# Patient Record
Sex: FEMALE | Race: WHITE | NOT HISPANIC OR LATINO | Employment: OTHER | URBAN - METROPOLITAN AREA
[De-identification: names, ages, dates, MRNs, and addresses within clinical notes are randomized per-mention and may not be internally consistent; named-entity substitution may affect disease eponyms.]

---

## 2017-01-26 ENCOUNTER — OFFICE VISIT (OUTPATIENT)
Dept: PAIN MEDICINE | Facility: CLINIC | Age: 37
End: 2017-01-26
Attending: ANESTHESIOLOGY
Payer: COMMERCIAL

## 2017-01-26 VITALS
WEIGHT: 217.63 LBS | TEMPERATURE: 99 F | SYSTOLIC BLOOD PRESSURE: 119 MMHG | HEART RATE: 73 BPM | HEIGHT: 62 IN | DIASTOLIC BLOOD PRESSURE: 69 MMHG | BODY MASS INDEX: 40.05 KG/M2

## 2017-01-26 DIAGNOSIS — M51.16 DISPLACEMENT OF LUMBAR DISC WITH RADICULOPATHY: ICD-10-CM

## 2017-01-26 DIAGNOSIS — M54.16 LEFT LUMBAR RADICULITIS: ICD-10-CM

## 2017-01-26 DIAGNOSIS — M51.36 DDD (DEGENERATIVE DISC DISEASE), LUMBAR: Primary | ICD-10-CM

## 2017-01-26 PROCEDURE — 99999 PR PBB SHADOW E&M-EST. PATIENT-LVL III: CPT | Mod: PBBFAC,,, | Performed by: ANESTHESIOLOGY

## 2017-01-26 PROCEDURE — 99214 OFFICE O/P EST MOD 30 MIN: CPT | Mod: S$GLB,,, | Performed by: ANESTHESIOLOGY

## 2017-01-26 PROCEDURE — 1159F MED LIST DOCD IN RCRD: CPT | Mod: S$GLB,,, | Performed by: ANESTHESIOLOGY

## 2017-01-26 RX ORDER — METHYLPREDNISOLONE 4 MG/1
TABLET ORAL
Refills: 0 | COMMUNITY
Start: 2017-01-16 | End: 2017-01-26

## 2017-01-26 RX ORDER — MELOXICAM 15 MG/1
TABLET ORAL
Refills: 1 | COMMUNITY
Start: 2017-01-01 | End: 2017-01-26

## 2017-01-26 RX ORDER — HYDROCODONE BITARTRATE AND ACETAMINOPHEN 10; 325 MG/1; MG/1
TABLET ORAL
Refills: 0 | COMMUNITY
Start: 2017-01-16 | End: 2017-03-13

## 2017-01-26 RX ORDER — TIZANIDINE 2 MG/1
TABLET ORAL
Refills: 1 | COMMUNITY
Start: 2017-01-16 | End: 2017-02-24 | Stop reason: DRUGHIGH

## 2017-01-26 RX ORDER — GABAPENTIN 300 MG/1
CAPSULE ORAL
Qty: 90 CAPSULE | Refills: 5 | Status: SHIPPED | OUTPATIENT
Start: 2017-01-26 | End: 2017-03-24 | Stop reason: SDUPTHER

## 2017-01-26 RX ORDER — TIZANIDINE HYDROCHLORIDE 2 MG/1
CAPSULE, GELATIN COATED ORAL
COMMUNITY
End: 2017-02-24 | Stop reason: DRUGHIGH

## 2017-01-26 RX ORDER — NABUMETONE 500 MG/1
500 TABLET, FILM COATED ORAL 2 TIMES DAILY PRN
Qty: 60 TABLET | Refills: 5 | Status: SHIPPED | OUTPATIENT
Start: 2017-01-26 | End: 2017-07-25

## 2017-01-26 NOTE — PROGRESS NOTES
Subjective:      Patient ID: Sarah Mukherjee is a 36 y.o. female.    Chief Complaint: Low-back Pain    Referred by: No ref. provider found     HPI:    Mrs. Mukherjee is a 34-year-old female who presents today with Low back pain.  Her medical history is significant for depression, anxiety, neurogenic bladder and endometriosis, status post total hysterectomy. She reports her back pain is present about 2 and half years.  She has been seeing Dr. Matt for her pain.  He has had multiple injections for her all with limited benefit.  These have included radiofrequency ablations and epidurals.  Her most recent was March 11 of this year.  This procedure actually worsened her pain. Of note, she reports multiple life stressors, including the fact that she has had a hysterectomy which means that she her  cannot have children.   Her pain is described in detail below.    Interval History (1/26/2017):  She returns today for follow up.  She reports that she was doing better until 2 months ago when she start having a new onset of left lower extremity radiating pain.  The pain originates in her low back and radiates down the posterolateral aspect of her left leg to the anterior shin.  This began after she was hunting and sitting for a prolonged period of time in a deer stand.  She had one what sounds like a Left L4 TFESI by Dr. Mike Stewart that caused her pain to be worse.  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    Physical Therapy: The extended course of physical therapy last year.  This was helpful while she was going.  She did her home exercise program until a recent bladder infection.  Since that time she's not done her HEP    Non-pharmacologic Treatment: Rest and heat make her pain better.         · TENS? Not tried    Pain Medications:         · Currently taking: Norco 10/325 mg 3 times daily.  She occasionally takes OTC NSAIDs as needed. It is somewhat helpful.    · Has tried in the  past:  Hydrocodone/acetaminophen was not helpful.  She recently tried gabapentin 100 mg daily with no benefit.      · Has not tried: TCAs, SNRIs, cream    Blood thinners: None    Interventional Therapies:   · Multiple epidural steroid injections and radiofrequency ablation.  · Left L4 TFESI: worsening pain    Relevant Surgeries: She has had a hysterectomy    Affecting sleep? Yes    Affecting daily activities? Yes    Depressive symptoms? Yes          · SI/HI? No    Work status: She is disabled    Pain Scales  Best: 4/10  Worst: 9/10  Usually: 7/10  Today: 7/10    Low-back Pain   This is a chronic problem. The current episode started more than 1 year ago. The problem occurs constantly. The problem has been gradually worsening since onset. The pain is present in the lumbar spine. The pain does not radiate. Quality: Sharp and Deep. The pain is at a severity of 7/10. The pain is moderate. The pain is the same all the time. The symptoms are aggravated by standing and bending (Walking and Getting out of bed or chair). She has tried injection treatment (Medication and Laying Down) for the symptoms. The treatment provided moderate relief.     Review of Systems   Musculoskeletal: Positive for back pain.   Psychiatric/Behavioral: Positive for depression. The patient is nervous/anxious.    All other systems reviewed and are negative.      Past Medical History   Diagnosis Date    Crohn's disease     Degenerative joint disease of spine     Esophagitis     Lumbar facet arthropathy 6/20/2014    Neurogenic bladder        Past Surgical History   Procedure Laterality Date    Hysterectomy       total hysterectomy in 20s due to fibroid, endometriosis    Mastectomy      Pelvic laparoscopy      Breast reconstruction         Review of patient's allergies indicates:   Allergen Reactions    Penicillins Hives       Current Outpatient Prescriptions   Medication Sig Dispense Refill    alprazolam (XANAX) 0.5 MG tablet Take 1 tablet  "(0.5 mg total) by mouth 2 (two) times daily. as needed for anxiety. 60 tablet 2    benzonatate (TESSALON) 200 MG capsule Take 200 mg by mouth 3 (three) times daily as needed for Cough.      doxycycline (DORYX) 100 MG EC tablet Take 100 mg by mouth 2 (two) times daily.      estradiol (ESTRACE) 2 MG tablet Take 1 tablet (2 mg total) by mouth once daily. 90 tablet 1    guaifenesin-codeine 100-10 mg/5 ml (TUSSI-ORGANIDIN NR)  mg/5 mL syrup Take 5 mLs by mouth 3 (three) times daily as needed for Cough.      sertraline (ZOLOFT) 100 MG tablet Take 1 tablet (100 mg total) by mouth once daily. 90 tablet 3    sumatriptan (IMITREX) 25 MG Tab Take 1 tablet (25 mg total) by mouth every 2 (two) hours as needed. 10 tablet 5    topiramate (TOPAMAX) 25 MG tablet TAKE 1 TABLET BY MOUTH EVERY NIGHT AT BEDTIME FOR 7 DAYS THEN INCREASE TO 2 TABLETS BY MOUTH EVERY NIGHT AT BEDTIME 180 tablet 2    zolpidem (AMBIEN) 5 MG Tab Take 1 tablet (5 mg total) by mouth nightly as needed. 30 tablet 2     No current facility-administered medications for this visit.        Family History   Problem Relation Age of Onset    Diabetes Mother     Hypertension Mother     Breast cancer Maternal Aunt 47    Ovarian cancer Maternal Aunt        Social History     Social History    Marital status:      Spouse name: N/A    Number of children: N/A    Years of education: N/A     Occupational History    Not on file.     Social History Main Topics    Smoking status: Never Smoker    Smokeless tobacco: Not on file    Alcohol use No    Drug use: No    Sexual activity: Yes     Other Topics Concern    Not on file     Social History Narrative           Objective:      Vitals:    01/26/17 0838   BP: 119/69   Pulse: 73   Temp: 98.9 °F (37.2 °C)   Weight: 98.7 kg (217 lb 9.5 oz)   Height: 5' 2" (1.575 m)   PainSc: 10-Worst pain ever   PainLoc: Back       Ortho/SPM Exam    GEN:  Well developed, well nourished.  No acute distress. No pain " behavior.  HEENT:  No trauma.  Mucous membranes moist.  Nares patent bilaterally.  PSYCH: Normal affect. Thought content appropriate.  CHEST:  Breathing symmetric.  No audible wheezing.  ABD: Soft, non-tender, non-distended.  SKIN:  Warm, pink, dry.  No rash on exposed areas.    EXT:  No cyanosis, clubbing, or edema.  No color change or changes in nail or hair growth.  NEURO/MUSCULOSKELETAL:  Fully alert, oriented, and appropriate. Speech normal belia. No cranial nerve deficits.   Gait: Normal.  Present trendelenburg sign bilaterally.   Motor Strength: 5/5 motor strength throughout lower extremities.   Sensory: No sensory deficit in the lower extremities.   Reflexes:  2+ and symmetric throughout.  Downgoing Babinski's bilaterally.  No clonus or spasticity.  L-Spine:  Decreased ROM with pain on Flexion. Negative facet loading bilaterally.  Negative SLR left.  Negative femoral stretch on left  SI Joint/Hip: Negative CHANDNI bilaterally.  Negative FADIR bilaterally.  TTP over left>right lumbar paraspinals.  No TTP over hips, piriformis muscles, or GTB.        Imaging:      Result Narrative       MRI lumbar spine without contrast.    Comparison: None.    Technique: Sagittal T1, T2, stir and axial T2 and T1-weighted images were obtained. The patient received 20 cc of Omniscan IV contrast .    Results: The alignment of the lumbar spine is normal. Vertebral body heights and disk spaces are relatively well maintained. No evidence of malignant bone marrow replacement process or infection. The conus medullaris terminates in good position. Very  mild disk desiccation seen at L4-5 with very minimal disk bulge and small left foraminal disk protrusion. No there is no central canal or foraminal stenosis is seen at any level. There is mild facet joint degenerative changes at L5 S1. The paraspinal  soft tissues appear normal. Following the administration of IV gadolinium contrast, no areas of abnormal enhancement seen to suggest an  inflammatory, infectious or tumor process.          Result Impression       Subtle left foraminal disk protrusion at L4-L5 causing no significant mass effect on the exiting nerve root. There is no severe central canal or foraminal stenosis.         Assessment:       Encounter Diagnoses   Name Primary?    DDD (degenerative disc disease), lumbar Yes    Left lumbar radiculitis     Displacement of lumbar disc with radiculopathy          Plan:       Sarah was seen today for low-back pain.    Diagnoses and all orders for this visit:    DDD (degenerative disc disease), lumbar  -     nabumetone (RELAFEN) 500 MG tablet; Take 1 tablet (500 mg total) by mouth 2 (two) times daily as needed for Pain.    Left lumbar radiculitis  -     gabapentin (NEURONTIN) 300 MG capsule; Increase as directed until taking 1 capsule TID    Displacement of lumbar disc with radiculopathy    From her first visit: She has a history of chronic pain following GYN surgery with irritative bladder.  In addition to this, she has a history of multiple life stressors.  Her exam is essentially benign with each of the above diagnoses the relatively minor.  Her MRI is essentially normal.  I suspect that there may be a large underlying central component to her pain.  Because of this, I would favor starting with conservative treatment regiment.  I recommend holding off on interventional procedures for now.  I do not feel that she is a candidate for spinal cord termination at this time.  Of note, on her physical exam today her core strength was quite weak.     Assessment from 1/26/2017: Today, her central sensitization is under much better control than when I saw her in the past.  Today, she has focal physical exam findings and symptoms of a left L4 radiculitis.     I discussed the treatment options with her today, including risks, benefits, and alternatives. All available images were reviewed. The patient is aware of the risks and benefits of the medications  being prescribed, common side effects, and proper usage.    1. Will schedule for L4/5 ILESI  2. Nabumetone as above.  3. Start Gabapentin 300 MG, titrating up to an initial dose of 300 mg TID.  Will titrate up as tolerated to a goal dose of 6955-2276 mg daily. Stay at most comfortable dose. May increase further if tolerated. Advised of possible s/e including sedation, dizziness, and swelling in the extremities.   4. After this injection, we will send a referral to PT.  5. I have stressed the importance of physical activity and a home exercise plan to help with pain and improve overall health.  6. RTC in 4-6 weeks or sooner if needed.     Greater than 30 minutes spent in total in todays visit with the patient, with more than half that time direct face to face counseling and education with the patient today. We discussed the disease process, prognosis, treatment plan, and risks and benefits.    Thank you for allowing me to participate in the care of this patient.   Please do not hesitate to call me at [phone] with any questions or concerns.

## 2017-01-26 NOTE — MR AVS SNAPSHOT
[facility]  [address]  [place] [address]  Phone: [phone]  Fax: [phone]                  Sarah Mukherjee   2017 8:45 AM   Office Visit    Description:  Female : 1980   Provider:  Andra Burt MD   Department:  [facility]           Reason for Visit     Low-back Pain           Diagnoses this Visit        Comments    DDD (degenerative disc disease), lumbar    -  Primary     Left lumbar radiculitis         Displacement of lumbar disc with radiculopathy                To Do List           Future Appointments        Provider Department Dept Phone    3/2/2017 10:15 AM Andra Burt MD [facility] [phone]    3/29/2017 10:00 AM Fadi Sullivan MD [facility] [phone]      Your Future Surgeries/Procedures     2017   Surgery with Andra Burt MD   [facility]    [address] [facility] [address] [phone]              Goals (5 Years of Data)     None       These Medications        Disp Refills Start End    gabapentin (NEURONTIN) 300 MG capsule 90 capsule 5 2017    Increase as directed until taking 1 capsule TID    Pharmacy: [facility] [address] [place] [address] [email or web address] [facility] Ph #: [phone]       nabumetone (RELAFEN) 500 MG tablet 60 tablet 5 2017    Take 1 tablet (500 mg total) by mouth 2 (two) times daily as needed for Pain. - Oral    Pharmacy: [facility] [id number] [place] [address] [email or web address] [facility] Ph #: [phone]         [facility] On Call     [facility] On Call Nurse Care Line -  Assistance  Registered nurses in the [facility] provide clinical advisement, health education, appointment booking, and other advisory services.  Call for this free service at [phone].             Medications           Message regarding Medications      Verify the changes and/or additions to your medication regime listed below are the same as discussed with your clinician today.  If any of these changes or additions are incorrect, please notify your healthcare provider.        START taking these NEW medications        Refills    gabapentin (NEURONTIN) 300 MG capsule 5    Sig: Increase as directed until taking 1 capsule TID    Class: Normal    nabumetone (RELAFEN) 500 MG tablet 5    Sig: Take 1 tablet (500 mg total) by mouth 2 (two) times daily as needed for Pain.    Class: Normal    Route: Oral      STOP taking these medications     methylPREDNISolone (MEDROL DOSEPACK) 4 mg tablet TK UTD    meloxicam (MOBIC) 15 MG tablet TK 1 T PO QD AFTER MEDROL DOSE PACK           Verify that the below list of medications is an accurate representation of the medications you are currently taking.  If none reported, the list may be blank. If incorrect, please contact your healthcare provider. Carry this list with you in case of emergency.           Current Medications     alprazolam (XANAX) 0.5 MG tablet Take 1 tablet (0.5 mg total) by mouth 2 (two) times daily. as needed for anxiety.    estradiol (ESTRACE) 2 MG tablet Take 1 tablet (2 mg total) by mouth once daily.    hydrocodone-acetaminophen 10-325mg (NORCO)  mg Tab TK 1 T PO Q 8 H PRN P    sertraline (ZOLOFT) 100 MG tablet Take 1 tablet (100 mg total) by mouth once daily.    sumatriptan (IMITREX) 25 MG Tab Take 1 tablet (25 mg total) by mouth every 2 (two) hours as needed.    tizanidine 2 mg Cap Take by mouth.    topiramate (TOPAMAX) 25 MG tablet TAKE 1 TABLET BY MOUTH EVERY NIGHT AT BEDTIME FOR 7 DAYS THEN INCREASE TO 2 TABLETS BY MOUTH EVERY NIGHT AT BEDTIME    zolpidem (AMBIEN) 5 MG Tab Take 1 tablet (5 mg total) by mouth nightly as needed.    benzonatate (TESSALON) 200 MG capsule Take 200 mg by mouth 3 (three) times daily as needed for Cough.    doxycycline (DORYX) 100 MG EC tablet Take 100 mg by mouth 2 (two)  "times daily.    gabapentin (NEURONTIN) 300 MG capsule Increase as directed until taking 1 capsule TID    guaifenesin-codeine 100-10 mg/5 ml (TUSSI-ORGANIDIN NR)  mg/5 mL syrup Take 5 mLs by mouth 3 (three) times daily as needed for Cough.    nabumetone (RELAFEN) 500 MG tablet Take 1 tablet (500 mg total) by mouth 2 (two) times daily as needed for Pain.    tizanidine (ZANAFLEX) 2 MG tablet TK 1 T PO QHS PRF SPASMS           Clinical Reference Information           Vital Signs - Last Recorded  Most recent update: 1/26/2017  8:42 AM by Siddhartha Mensah MA    BP Pulse Temp Ht Wt BMI    119/69 73 98.9 °F (37.2 °C) 5' 2" (1.575 m) 98.7 kg (217 lb 9.5 oz) 39.8 kg/m2      Blood Pressure          Most Recent Value    BP  119/69      Allergies as of 1/26/2017     Penicillins      Immunizations Administered on Date of Encounter - 1/26/2017     None      Instructions    Gabapentin dose schedule:  - Start with one tablet at night for 7 days   - Then increase to one tablet twice a day for 7 days  - Then increase to one tablet three times daily      Gabapentin Oral tablet  What is this medicine?  GABAPENTIN (GA ba pen tin) is used to control partial seizures in adults with epilepsy. It is also used to treat certain types of nerve pain.  This medicine may be used for other purposes; ask your health care provider or pharmacist if you have questions.  What should I tell my health care provider before I take this medicine?  They need to know if you have any of these conditions:  · kidney disease  · suicidal thoughts, plans, or attempt; a previous suicide attempt by you or a family member  · an unusual or allergic reaction to gabapentin, other medicines, foods, dyes, or preservatives  · pregnant or trying to get pregnant  · breast-feeding  How should I use this medicine?  Take this medicine by mouth with a glass of water. Follow the directions on the prescription label. You can take it with or without food. If it upsets your " stomach, take it with food.Take your medicine at regular intervals. Do not take it more often than directed. Do not stop taking except on your doctor's advice.  If you are directed to break the 600 or 800 mg tablets in half as part of your dose, the extra half tablet should be used for the next dose. If you have not used the extra half tablet within 28 days, it should be thrown away.  A special MedGuide will be given to you by the pharmacist with each prescription and refill. Be sure to read this information carefully each time.  Talk to your pediatrician regarding the use of this medicine in children. Special care may be needed.  Overdosage: If you think you have taken too much of this medicine contact a poison control center or emergency room at once.  NOTE: This medicine is only for you. Do not share this medicine with others.  What if I miss a dose?  If you miss a dose, take it as soon as you can. If it is almost time for your next dose, take only that dose. Do not take double or extra doses.  What may interact with this medicine?  Do not take this medicine with any of the following medications:  · other gabapentin products  This medicine may also interact with the following medications:  · alcohol  · antacids  · antihistamines for allergy, cough and cold  · certain medicines for anxiety or sleep  · certain medicines for depression or psychotic disturbances  · homatropine; hydrocodone  · naproxen  · narcotic medicines (opiates) for pain  · phenothiazines like chlorpromazine, mesoridazine, prochlorperazine, thioridazine  This list may not describe all possible interactions. Give your health care provider a list of all the medicines, herbs, non-prescription drugs, or dietary supplements you use. Also tell them if you smoke, drink alcohol, or use illegal drugs. Some items may interact with your medicine.  What should I watch for while using this medicine?  Visit your doctor or health care professional for regular  checks on your progress. You may want to keep a record at home of how you feel your condition is responding to treatment. You may want to share this information with your doctor or health care professional at each visit. You should contact your doctor or health care professional if your seizures get worse or if you have any new types of seizures. Do not stop taking this medicine or any of your seizure medicines unless instructed by your doctor or health care professional. Stopping your medicine suddenly can increase your seizures or their severity.  Wear a medical identification bracelet or chain if you are taking this medicine for seizures, and carry a card that lists all your medications.  You may get drowsy, dizzy, or have blurred vision. Do not drive, use machinery, or do anything that needs mental alertness until you know how this medicine affects you. To reduce dizzy or fainting spells, do not sit or stand up quickly, especially if you are an older patient. Alcohol can increase drowsiness and dizziness. Avoid alcoholic drinks.  Your mouth may get dry. Chewing sugarless gum or sucking hard candy, and drinking plenty of water will help.  The use of this medicine may increase the chance of suicidal thoughts or actions. Pay special attention to how you are responding while on this medicine. Any worsening of mood, or thoughts of suicide or dying should be reported to your health care professional right away.  Women who become pregnant while using this medicine may enroll in the [place] Antiepileptic Drug Pregnancy Registry by calling 1-591.885.8274. This registry collects information about the safety of antiepileptic drug use during pregnancy.  What side effects may I notice from receiving this medicine?  Side effects that you should report to your doctor or health care professional as soon as possible:  · allergic reactions like skin rash, itching or hives, swelling of the face, lips, or tongue  · worsening  of mood, thoughts or actions of suicide or dying  Side effects that usually do not require medical attention (report to your doctor or health care professional if they continue or are bothersome):  · constipation  · difficulty walking or controlling muscle movements  · dizziness  · nausea  · slurred speech  · tiredness  · tremors  · weight gain  This list may not describe all possible side effects. Call your doctor for medical advice about side effects. You may report side effects to FDA at 6-477-UCG-7174.  Where should I keep my medicine?  Keep out of reach of children.  Store at room temperature between 15 and 30 degrees C (59 and 86 degrees F). Throw away any unused medicine after the expiration date.  NOTE:This sheet is a summary. It may not cover all possible information. If you have questions about this medicine, talk to your doctor, pharmacist, or health care provider. Copyright© 2016 [employer]

## 2017-01-26 NOTE — PATIENT INSTRUCTIONS
Gabapentin dose schedule:  - Start with one tablet at night for 7 days   - Then increase to one tablet twice a day for 7 days  - Then increase to one tablet three times daily      Gabapentin Oral tablet  What is this medicine?  GABAPENTIN (GA ba pen tin) is used to control partial seizures in adults with epilepsy. It is also used to treat certain types of nerve pain.  This medicine may be used for other purposes; ask your health care provider or pharmacist if you have questions.  What should I tell my health care provider before I take this medicine?  They need to know if you have any of these conditions:  · kidney disease  · suicidal thoughts, plans, or attempt; a previous suicide attempt by you or a family member  · an unusual or allergic reaction to gabapentin, other medicines, foods, dyes, or preservatives  · pregnant or trying to get pregnant  · breast-feeding  How should I use this medicine?  Take this medicine by mouth with a glass of water. Follow the directions on the prescription label. You can take it with or without food. If it upsets your stomach, take it with food.Take your medicine at regular intervals. Do not take it more often than directed. Do not stop taking except on your doctor's advice.  If you are directed to break the 600 or 800 mg tablets in half as part of your dose, the extra half tablet should be used for the next dose. If you have not used the extra half tablet within 28 days, it should be thrown away.  A special MedGuide will be given to you by the pharmacist with each prescription and refill. Be sure to read this information carefully each time.  Talk to your pediatrician regarding the use of this medicine in children. Special care may be needed.  Overdosage: If you think you have taken too much of this medicine contact a poison control center or emergency room at once.  NOTE: This medicine is only for you. Do not share this medicine with others.  What if I miss a dose?  If you miss a  dose, take it as soon as you can. If it is almost time for your next dose, take only that dose. Do not take double or extra doses.  What may interact with this medicine?  Do not take this medicine with any of the following medications:  · other gabapentin products  This medicine may also interact with the following medications:  · alcohol  · antacids  · antihistamines for allergy, cough and cold  · certain medicines for anxiety or sleep  · certain medicines for depression or psychotic disturbances  · homatropine; hydrocodone  · naproxen  · narcotic medicines (opiates) for pain  · phenothiazines like chlorpromazine, mesoridazine, prochlorperazine, thioridazine  This list may not describe all possible interactions. Give your health care provider a list of all the medicines, herbs, non-prescription drugs, or dietary supplements you use. Also tell them if you smoke, drink alcohol, or use illegal drugs. Some items may interact with your medicine.  What should I watch for while using this medicine?  Visit your doctor or health care professional for regular checks on your progress. You may want to keep a record at home of how you feel your condition is responding to treatment. You may want to share this information with your doctor or health care professional at each visit. You should contact your doctor or health care professional if your seizures get worse or if you have any new types of seizures. Do not stop taking this medicine or any of your seizure medicines unless instructed by your doctor or health care professional. Stopping your medicine suddenly can increase your seizures or their severity.  Wear a medical identification bracelet or chain if you are taking this medicine for seizures, and carry a card that lists all your medications.  You may get drowsy, dizzy, or have blurred vision. Do not drive, use machinery, or do anything that needs mental alertness until you know how this medicine affects you. To reduce  dizzy or fainting spells, do not sit or stand up quickly, especially if you are an older patient. Alcohol can increase drowsiness and dizziness. Avoid alcoholic drinks.  Your mouth may get dry. Chewing sugarless gum or sucking hard candy, and drinking plenty of water will help.  The use of this medicine may increase the chance of suicidal thoughts or actions. Pay special attention to how you are responding while on this medicine. Any worsening of mood, or thoughts of suicide or dying should be reported to your health care professional right away.  Women who become pregnant while using this medicine may enroll in the [place] Antiepileptic Drug Pregnancy Registry by calling 1-600.816.9753. This registry collects information about the safety of antiepileptic drug use during pregnancy.  What side effects may I notice from receiving this medicine?  Side effects that you should report to your doctor or health care professional as soon as possible:  · allergic reactions like skin rash, itching or hives, swelling of the face, lips, or tongue  · worsening of mood, thoughts or actions of suicide or dying  Side effects that usually do not require medical attention (report to your doctor or health care professional if they continue or are bothersome):  · constipation  · difficulty walking or controlling muscle movements  · dizziness  · nausea  · slurred speech  · tiredness  · tremors  · weight gain  This list may not describe all possible side effects. Call your doctor for medical advice about side effects. You may report side effects to FDA at 8-785-FDA-0226.  Where should I keep my medicine?  Keep out of reach of children.  Store at room temperature between 15 and 30 degrees C (59 and 86 degrees F). Throw away any unused medicine after the expiration date.  NOTE:This sheet is a summary. It may not cover all possible information. If you have questions about this medicine, talk to your doctor, pharmacist, or health care  provider. Copyright© 2016 [employer]

## 2017-02-07 ENCOUNTER — SURGERY (OUTPATIENT)
Age: 37
End: 2017-02-07

## 2017-02-07 PROBLEM — M51.36 DDD (DEGENERATIVE DISC DISEASE), LUMBAR: Status: ACTIVE | Noted: 2017-02-07

## 2017-02-07 PROBLEM — M51.369 DDD (DEGENERATIVE DISC DISEASE), LUMBAR: Status: ACTIVE | Noted: 2017-02-07

## 2017-02-16 ENCOUNTER — PATIENT MESSAGE (OUTPATIENT)
Dept: PAIN MEDICINE | Facility: CLINIC | Age: 37
End: 2017-02-16

## 2017-02-17 RX ORDER — ALPRAZOLAM 0.5 MG/1
TABLET ORAL
Qty: 60 TABLET | Refills: 0 | OUTPATIENT
Start: 2017-02-17

## 2017-02-21 ENCOUNTER — TELEPHONE (OUTPATIENT)
Dept: PAIN MEDICINE | Facility: CLINIC | Age: 37
End: 2017-02-21

## 2017-02-21 NOTE — TELEPHONE ENCOUNTER
----- Message from Hamida Miri sent at 2/21/2017  8:05 AM CST -----  _  1st Request  _  2nd Request  _  3rd Request        Who: Sarah Mukherjee    Why: please call pt, she has to wait until 3/7/17 for injection, but she is in severe pain. She can hardly walk and doesn't know what to do. Please advise    What Number to Call Back: [phone]    When to Expect a call back: (Before the end of the day)   -- if the call is after 12:00, the call back will be tomorrow.          Patient was contacted as per patient she's been having good and bad days. patient stated that she's now where she can't bend down. Patient stated that she's scheduled for her injection until 03/07/17 but she can't wait that long. She stated that the pain is from her hip top her leg. Patient stated that she's taking Nabumetone and Gabapentin but its not helping. Patient stated that she was contacted by [facility] but her insurance won't cover the original Rx that was ordered so they would submit an alternative. Patient stated that she has no started Pt because she can't bend over. Patient stated that she would like  Something for pain until she is scheduled for her injection. Patient was informed that a message would be forwarded to  and when she responds the office would contact her. Patient verbalized understanding

## 2017-02-22 ENCOUNTER — HOSPITAL ENCOUNTER (EMERGENCY)
Facility: OTHER | Age: 37
Discharge: HOME | End: 2017-02-22
Attending: EMERGENCY MEDICINE
Payer: COMMERCIAL

## 2017-02-22 VITALS
WEIGHT: 202 LBS | OXYGEN SATURATION: 98 % | RESPIRATION RATE: 20 BRPM | BODY MASS INDEX: 37.17 KG/M2 | HEIGHT: 62 IN | SYSTOLIC BLOOD PRESSURE: 125 MMHG | HEART RATE: 67 BPM | DIASTOLIC BLOOD PRESSURE: 74 MMHG | TEMPERATURE: 100 F

## 2017-02-22 DIAGNOSIS — M54.42 ACUTE LEFT-SIDED LOW BACK PAIN WITH LEFT-SIDED SCIATICA: Primary | ICD-10-CM

## 2017-02-22 PROCEDURE — 96372 THER/PROPH/DIAG INJ SC/IM: CPT

## 2017-02-22 PROCEDURE — 99283 EMERGENCY DEPT VISIT LOW MDM: CPT

## 2017-02-22 PROCEDURE — 63600175 PHARM REV CODE 636 W HCPCS: Performed by: EMERGENCY MEDICINE

## 2017-02-22 RX ORDER — METHOCARBAMOL 750 MG/1
1500 TABLET, FILM COATED ORAL 3 TIMES DAILY
Qty: 30 TABLET | Refills: 0 | Status: SHIPPED | OUTPATIENT
Start: 2017-02-22 | End: 2017-02-24

## 2017-02-22 RX ORDER — PREDNISONE 10 MG/1
10 TABLET ORAL DAILY
Qty: 21 TABLET | Refills: 0 | Status: SHIPPED | OUTPATIENT
Start: 2017-02-22 | End: 2017-03-04

## 2017-02-22 RX ORDER — KETOROLAC TROMETHAMINE 30 MG/ML
60 INJECTION, SOLUTION INTRAMUSCULAR; INTRAVENOUS
Status: COMPLETED | OUTPATIENT
Start: 2017-02-22 | End: 2017-02-22

## 2017-02-22 RX ORDER — DIAZEPAM 10 MG/2ML
10 INJECTION INTRAMUSCULAR
Status: COMPLETED | OUTPATIENT
Start: 2017-02-22 | End: 2017-02-22

## 2017-02-22 RX ADMIN — KETOROLAC TROMETHAMINE 60 MG: 60 INJECTION, SOLUTION INTRAMUSCULAR at 02:02

## 2017-02-22 RX ADMIN — DIAZEPAM 10 MG: 5 INJECTION, SOLUTION INTRAMUSCULAR; INTRAVENOUS at 02:02

## 2017-02-22 NOTE — ED TRIAGE NOTES
chronic lower back and hip pain, worse since falling on Friday.  pt ambulates without difficulty and states that is hurts to sit on her left side and bend over.

## 2017-02-22 NOTE — ED AVS SNAPSHOT
[facility] EMERGENCY DEPARTMENT  [address] [address]               Sarah Mukherjee   2017  1:17 PM   ED    Description:  Female : 1980   Department:  [facility] Emergency Department           Your Care was Coordinated By:     Provider Role From To    Beatriz Springer MD Attending Provider 17 1400 --      Reason for Visit     Hip Pain           Diagnoses this Visit        Comments    Acute left-sided low back pain with left-sided sciatica    -  Primary       ED Disposition     None           To Do List           Follow-up Information     Follow up with Staci Sorenson MD In 2 days.    Specialty:  Family Medicine    Contact information:    [address] [place] [address]  [phone]         These Medications        Disp Refills Start End    predniSONE (DELTASONE) 10 MG tablet 21 tablet 0 2017 3/4/2017    Take 1 tablet (10 mg total) by mouth once daily. Take 4 tabs x 3 days, then  Take 2 tabs x 3 days, then   Take 1 tab x 3 days. - Oral    Pharmacy: [facility] [address] [place], [place] [address] Ph #: [phone]       methocarbamol (ROBAXIN) 750 MG Tab 30 tablet 0 2017    Take 2 tablets (1,500 mg total) by mouth 3 (three) times daily. - Oral    Pharmacy: [facility] [address] [place], [place] [address] #: [phone]         [facility] On Call     [facility] On Call Nurse Care Line - 24/7 Assistance  Registered nurses in the [facility] provide clinical advisement, health education, appointment booking, and other advisory services.  Call for this free service at [phone].             Medications           Message regarding Medications     Verify the changes and/or additions to your medication regime listed below are the same as discussed with your clinician today.  If any of these changes or additions are incorrect, please notify  your healthcare provider.        START taking these NEW medications        Refills    predniSONE (DELTASONE) 10 MG tablet 0    Sig: Take 1 tablet (10 mg total) by mouth once daily. Take 4 tabs x 3 days, then  Take 2 tabs x 3 days, then   Take 1 tab x 3 days.    Class: Print    Route: Oral    methocarbamol (ROBAXIN) 750 MG Tab 0    Sig: Take 2 tablets (1,500 mg total) by mouth 3 (three) times daily.    Class: Print    Route: Oral      These medications were administered today        Dose Freq    diazePAM injection 10 mg 10 mg ED 1 Time    Sig: Inject 2 mLs (10 mg total) into the muscle ED 1 Time.    Class: Normal    Route: Intramuscular    ketorolac injection 60 mg 60 mg ED 1 Time    Sig: Inject 2 mLs (60 mg total) into the muscle ED 1 Time.    Class: Normal    Route: Intramuscular           Verify that the below list of medications is an accurate representation of the medications you are currently taking.  If none reported, the list may be blank. If incorrect, please contact your healthcare provider. Carry this list with you in case of emergency.           Current Medications     alprazolam (XANAX) 0.5 MG tablet Take 1 tablet (0.5 mg total) by mouth 2 (two) times daily. as needed for anxiety.    benzonatate (TESSALON) 200 MG capsule Take 200 mg by mouth 3 (three) times daily as needed for Cough.    doxycycline (DORYX) 100 MG EC tablet Take 100 mg by mouth 2 (two) times daily.    estradiol (ESTRACE) 2 MG tablet Take 1 tablet (2 mg total) by mouth once daily.    gabapentin (NEURONTIN) 300 MG capsule Increase as directed until taking 1 capsule TID    guaifenesin-codeine 100-10 mg/5 ml (TUSSI-ORGANIDIN NR)  mg/5 mL syrup Take 5 mLs by mouth 3 (three) times daily as needed for Cough.    hydrocodone-acetaminophen 10-325mg (NORCO)  mg Tab TK 1 T PO Q 8 H PRN P    methocarbamol (ROBAXIN) 750 MG Tab Take 2 tablets (1,500 mg total) by mouth 3 (three) times daily.    nabumetone (RELAFEN) 500 MG tablet Take 1 tablet  "(500 mg total) by mouth 2 (two) times daily as needed for Pain.    predniSONE (DELTASONE) 10 MG tablet Take 1 tablet (10 mg total) by mouth once daily. Take 4 tabs x 3 days, then  Take 2 tabs x 3 days, then   Take 1 tab x 3 days.    sertraline (ZOLOFT) 100 MG tablet Take 1 tablet (100 mg total) by mouth once daily.    sumatriptan (IMITREX) 25 MG Tab Take 1 tablet (25 mg total) by mouth every 2 (two) hours as needed.    tizanidine (ZANAFLEX) 2 MG tablet TK 1 T PO QHS PRF SPASMS    tizanidine 2 mg Cap Take by mouth.    topiramate (TOPAMAX) 25 MG tablet TAKE 1 TABLET BY MOUTH EVERY NIGHT AT BEDTIME FOR 7 DAYS THEN INCREASE TO 2 TABLETS BY MOUTH EVERY NIGHT AT BEDTIME    zolpidem (AMBIEN) 5 MG Tab Take 1 tablet (5 mg total) by mouth nightly as needed.           Clinical Reference Information           Your Vitals Were     BP Pulse Temp Resp Height Weight    125/74 67 99.5 °F (37.5 °C) 20 5' 2" (1.575 m) 91.6 kg (202 lb)    SpO2 BMI             98% 36.95 kg/m2         Allergies as of 2/22/2017        Reactions    Penicillins Hives      Immunizations Administered on Date of Encounter - 2/22/2017     None      ED Micro, Lab, POCT     None      ED Imaging Orders     Start Ordered       Status Ordering Provider    02/22/17 1426 02/22/17 1425  X-Ray Hip 2 View Left  1 time imaging      Final result         Discharge Instructions         Causes of Lumbar (Low Back) Pain  Low back pain can be caused by problems with any part of the lumbar spine. A disk can herniate (push out) and press on a nerve. Vertebrae can rub against each other or slip out of place. This can irritate facet joints and nerves. It can also lead to stenosis, a narrowing of the spinal canal or foramen.  Pressure from a disk  Constant wear and tear on a disk can cause it to weaken and push outward. Part of the disk may then press on nearby nerves. There are two common types of herniated disks:  Contained means the soft nucleus is protruding outward.   Extruded " means the firm annulus has torn, letting the soft center squeeze through.     Pressure from bone  An unstable spine   With age, a disk may thin and wear out. Vertebrae above and below the disk may begin to touch. This can put pressure on nerves. It can also cause bone spurs (growths) to form where the bones rub together.    Stenosis results when bone spurs narrow the foramen or spinal canal. This also puts pressure on nerves. Slipping vertebrae can irritate nerves and joints. They can also worsen stenosis.    In some cases, vertebrae become unstable and slip forward. This is called spondylolisthesis.     Date Last Reviewed: 10/12/2015  © 3131-0355 [employer]. [address] [place] [address], [place] [address]. All rights reserved. This information is not intended as a substitute for professional medical care. Always follow your healthcare professional's instructions.          Sciatica    Sciatica is a condition that causes pain in the lower back that spreads down into the buttock, hip, and leg. Sometimes the leg pain can happen without any back pain. Sciatica happens when a spinal nerve is irritated or has pressure put on it as comes out of the spinal canal in the lower back. This most often happens when a bulge or rupture of a nearby spinal disk presses on the nerve. Sciatica can also be caused by a narrowing of the spinal canal (spinal stenosis) or spasm of the muscle in the buttocks that the sciatic nerve passes through (pyriform muscle). Sciatica is also called lumbar radiculopathy.  Sciatica may begin after a sudden twisting or bending force, such as in a car accident. Or it can happen after a simple awkward movement. In either case, muscle spasm often also happens. Muscle spasm makes the pain worse.  A healthcare provider makes a diagnosis of sciatica from your symptoms and a physical exam. Unless you had an injury from a car accident or fall, you usually wont have X-rays taken at this time. This is  because the nerves and disks in your back cant be seen on an X-ray. If the provider sees signs of a compressed nerve, you will need to schedule an MRI scan as an outpatient. Signs of a compressed nerve include loss of strength in a leg.  Most sciatica gets better with medicine, exercise, and physical therapy. If your symptoms continue after at least 3 months of medical treatment, you may need surgery or injections to your lower back.  Home care  Follow these tips when caring for yourself at home:  · You may need to stay in bed the first few days. But as soon as possible, begin sitting up or walking. This will help you avoid problems that come from staying in bed for long periods.  · When in bed, try to find a position that is comfortable. A firm mattress is best. Try lying flat on your back with pillows under your knees. You can also try lying on your side with your knees bent up toward your chest and a pillow between your knees.  · Avoid sitting for long periods. This puts more stress on your lower back than standing or walking.  · Use heat from a hot shower, hot bath, or heating pad to help ease pain. Massage can also help. You can also try using an ice pack. You can make your own ice pack by putting ice cubes in a plastic bag. Wrap the bag in a thin towel. Try both heat and cold to see which works best. Use the method that feels best for 20 minutes several times a day.  · You may use acetaminophen or ibuprofen to ease pain, unless another pain medicine was prescribed. Note: If you have chronic liver or kidney disease, talk with your healthcare provider before taking these medicines. Also talk with your provider if youve had a stomach ulcer or gastrointestinal bleeding.  · Use safe lifting methods. Dont lift anything heavier than 15 pounds until all of the pain is gone.  Follow-up care  Follow up with your healthcare provider, or as advised. You may need physical therapy or additional tests.  If X-rays were  taken, a radiologist will look at them. You will be told of any new findings that may affect your care.  When to seek medical advice  Call your healthcare provider right away if any of these occur:  · Pain gets worse even after taking prescribed medicine  · Weakness or numbness in 1 or both legs or hips  · Numbness in your groin or genital area  · You cant control your bowel or bladder  · Fever  · Redness or swelling over your back or spine   Date Last Reviewed: 8/1/2016 © 2000-2016 [employer]. [address] [place] [address]. All rights reserved. This information is not intended as a substitute for professional medical care. Always follow your healthcare professional's instructions.          Your Scheduled Appointments     Mar 29, 2017  7:15 AM CDT   Established Patient Visit with Andra Burt MD   [facility] ([facility])    [address]  [place] [address]   [phone]            Mar 29, 2017 10:00 AM CDT   Neurosurgery Consult with Fadi Sullivan MD   [facility] ([facility])    [address] [place] [address]  [address]  [facility] [address]   [phone]              Your Future Surgeries/Procedures     Mar 07, 2017   Surgery with Andra Burt MD   [facility] ([facility])    [address] [place] [address]   [phone]               [facility] Emergency Department complies with applicable Federal civil rights laws and does not discriminate on the basis of race, color, national origin, age, disability, or sex.        Language Assistance Services     ATTENTION: Language assistance services are available, free of charge. Please call [phone].      ATENCIÓN: Si habla español, tiene a li disposición servicios gratuitos de asistencia lingüística. Llame al [phone].     CHÚ Ý: N?u b?n nói Ti?ng Vi?t, có các d?ch v? h? tr? ngôn ng? mi?n phí dành cho b?n. G?i s? [phone].

## 2017-02-22 NOTE — DISCHARGE INSTRUCTIONS
Causes of Lumbar (Low Back) Pain  Low back pain can be caused by problems with any part of the lumbar spine. A disk can herniate (push out) and press on a nerve. Vertebrae can rub against each other or slip out of place. This can irritate facet joints and nerves. It can also lead to stenosis, a narrowing of the spinal canal or foramen.  Pressure from a disk  Constant wear and tear on a disk can cause it to weaken and push outward. Part of the disk may then press on nearby nerves. There are two common types of herniated disks:  Contained means the soft nucleus is protruding outward.   Extruded means the firm annulus has torn, letting the soft center squeeze through.     Pressure from bone  An unstable spine   With age, a disk may thin and wear out. Vertebrae above and below the disk may begin to touch. This can put pressure on nerves. It can also cause bone spurs (growths) to form where the bones rub together.    Stenosis results when bone spurs narrow the foramen or spinal canal. This also puts pressure on nerves. Slipping vertebrae can irritate nerves and joints. They can also worsen stenosis.    In some cases, vertebrae become unstable and slip forward. This is called spondylolisthesis.     Date Last Reviewed: 10/12/2015  © 8862-5040 [employer]. [address] [place] [address], [place] [address]. All rights reserved. This information is not intended as a substitute for professional medical care. Always follow your healthcare professional's instructions.          Sciatica    Sciatica is a condition that causes pain in the lower back that spreads down into the buttock, hip, and leg. Sometimes the leg pain can happen without any back pain. Sciatica happens when a spinal nerve is irritated or has pressure put on it as comes out of the spinal canal in the lower back. This most often happens when a bulge or rupture of a nearby spinal disk presses on the nerve. Sciatica can also be caused by a narrowing of the  spinal canal (spinal stenosis) or spasm of the muscle in the buttocks that the sciatic nerve passes through (pyriform muscle). Sciatica is also called lumbar radiculopathy.  Sciatica may begin after a sudden twisting or bending force, such as in a car accident. Or it can happen after a simple awkward movement. In either case, muscle spasm often also happens. Muscle spasm makes the pain worse.  A healthcare provider makes a diagnosis of sciatica from your symptoms and a physical exam. Unless you had an injury from a car accident or fall, you usually wont have X-rays taken at this time. This is because the nerves and disks in your back cant be seen on an X-ray. If the provider sees signs of a compressed nerve, you will need to schedule an MRI scan as an outpatient. Signs of a compressed nerve include loss of strength in a leg.  Most sciatica gets better with medicine, exercise, and physical therapy. If your symptoms continue after at least 3 months of medical treatment, you may need surgery or injections to your lower back.  Home care  Follow these tips when caring for yourself at home:  · You may need to stay in bed the first few days. But as soon as possible, begin sitting up or walking. This will help you avoid problems that come from staying in bed for long periods.  · When in bed, try to find a position that is comfortable. A firm mattress is best. Try lying flat on your back with pillows under your knees. You can also try lying on your side with your knees bent up toward your chest and a pillow between your knees.  · Avoid sitting for long periods. This puts more stress on your lower back than standing or walking.  · Use heat from a hot shower, hot bath, or heating pad to help ease pain. Massage can also help. You can also try using an ice pack. You can make your own ice pack by putting ice cubes in a plastic bag. Wrap the bag in a thin towel. Try both heat and cold to see which works best. Use the method that  feels best for 20 minutes several times a day.  · You may use acetaminophen or ibuprofen to ease pain, unless another pain medicine was prescribed. Note: If you have chronic liver or kidney disease, talk with your healthcare provider before taking these medicines. Also talk with your provider if youve had a stomach ulcer or gastrointestinal bleeding.  · Use safe lifting methods. Dont lift anything heavier than 15 pounds until all of the pain is gone.  Follow-up care  Follow up with your healthcare provider, or as advised. You may need physical therapy or additional tests.  If X-rays were taken, a radiologist will look at them. You will be told of any new findings that may affect your care.  When to seek medical advice  Call your healthcare provider right away if any of these occur:  · Pain gets worse even after taking prescribed medicine  · Weakness or numbness in 1 or both legs or hips  · Numbness in your groin or genital area  · You cant control your bowel or bladder  · Fever  · Redness or swelling over your back or spine   Date Last Reviewed: 8/1/2016 © 2000-2016 [employer]. [address] [place] [address], [place] [address]. All rights reserved. This information is not intended as a substitute for professional medical care. Always follow your healthcare professional's instructions.

## 2017-02-22 NOTE — ED PROVIDER NOTES
Encounter Date: 2/22/2017       History     Chief Complaint   Patient presents with    Hip Pain     chronic lower back and hip pain, worse since falling on Friday.  pt ambulates without difficulty and states that is hurts to sit on her left side and bend over.      Review of patient's allergies indicates:   Allergen Reactions    Penicillins Hives     HPI Comments: 38 Y/O WF WITH PMHX OF CHRONIC BACK PAIN PRESENTS WITH ACUTE EXACERBATION OF LEFT LOWER LUMBAR PAIN, LEFT HIP PAIN THAT RADIATES INTO LEFT LEG JUST PAST THE KNEE.  PT HAS CHRONIC BACK PAIN AND HAD STEWART IN January 2017 THAT DID NOT GIVE HER ANY RELIEF OF PAIN.  LAST WEEKEND, PT WAS IN [place], [place] ON VACATION AND SLIPPED WHILE WALKING AROUND A HOT TUB. SHE FELL ONTO HER LEFT SIDE.  SHE HAS BEEN AMBULATORY SINCE THAT TIME BUT REPORTS ACUTE WORSENING OF LOW BACK AND LEFT HIP PAIN.  SHE IS SCHEDULED FOR REPEAT STEWART MARCH 7TH AND CALLED HER DOCTOR TO TRY TO BE SEEN TODAY BUT WAS UNABLE TO GET AN APPT.  NO NUMBNESS/TINGLING/WEAKNESS.  PT REPORTS PAIN IS EXACERBATED WITH MOVEMENT OF LLE AND BENDING OF HER BACK.  NO RELIEVING FACTORS.      The history is provided by the patient. No  was used.     Past Medical History   Diagnosis Date    Cancer      skin cancer removed    Crohn's disease     Degenerative joint disease of spine     Esophagitis     History of stomach ulcers     Lumbar facet arthropathy 6/20/2014    Neurogenic bladder      No past medical history pertinent negatives.  Past Surgical History   Procedure Laterality Date    Hysterectomy       total hysterectomy in 20s due to fibroid, endometriosis    Mastectomy      Pelvic laparoscopy      Breast reconstruction      Tonsillectomy       Family History   Problem Relation Age of Onset    Diabetes Mother     Hypertension Mother     Breast cancer Maternal Aunt 47    Ovarian cancer Maternal Aunt      Social History   Substance Use Topics    Smoking status: Never Smoker     Smokeless tobacco: None    Alcohol use Yes      Comment: occ     Review of Systems   Constitutional: Negative for chills and fever.   HENT: Negative for congestion and sore throat.    Eyes: Negative.    Respiratory: Negative for cough and shortness of breath.    Cardiovascular: Negative for chest pain.   Gastrointestinal: Negative for abdominal distention, abdominal pain, diarrhea, nausea and vomiting.   Endocrine: Negative for polydipsia and polyphagia.   Genitourinary: Negative for difficulty urinating, dysuria, flank pain, frequency, hematuria and urgency.   Musculoskeletal: Positive for arthralgias and back pain. Negative for neck pain and neck stiffness.        LEFT LOWER BACK PAIN, LEFT HIP PAIN   Skin: Negative for pallor and rash.   Allergic/Immunologic: Negative for immunocompromised state.   Neurological: Negative for weakness and numbness.   Hematological: Does not bruise/bleed easily.   Psychiatric/Behavioral: Negative for agitation. The patient is nervous/anxious.    All other systems reviewed and are negative.      Physical Exam   Initial Vitals   BP Pulse Resp Temp SpO2   02/22/17 1325 02/22/17 1325 02/22/17 1325 02/22/17 1325 02/22/17 1325   125/74 67 20 99.5 °F (37.5 °C) 98 %     Physical Exam    Nursing note and vitals reviewed.  Constitutional: She appears well-developed and well-nourished. She is not diaphoretic. No distress.   HENT:   Head: Normocephalic and atraumatic.   Mouth/Throat: Oropharynx is clear and moist.   Eyes: Conjunctivae and EOM are normal. No scleral icterus.   Neck: Normal range of motion. Neck supple.   Cardiovascular: Normal rate, regular rhythm and normal heart sounds. Exam reveals no gallop and no friction rub.    No murmur heard.  Pulmonary/Chest: Breath sounds normal. No respiratory distress. She has no wheezes. She has no rhonchi. She has no rales.   Abdominal: Soft. She exhibits no distension. There is no tenderness.   Musculoskeletal: Normal range of motion. She  exhibits tenderness. She exhibits no edema.        Left hip: She exhibits tenderness. She exhibits normal range of motion, normal strength, no bony tenderness, no swelling, no deformity and no laceration.        Legs:  Lymphadenopathy:     She has no cervical adenopathy.   Neurological: She is alert and oriented to person, place, and time.   Skin: Skin is warm and dry. No rash noted.   Psychiatric: Her behavior is normal. Judgment and thought content normal.         ED Course   Procedures  Labs Reviewed - No data to display          Medical Decision Making:   Initial Assessment:   36 Y/O WF WITH CHRONIC BACK PAIN PRESENTS WITH ACUTE EXACERBATION OF LOW BACK PAIN AND LEFT HIP PAIN.  PT IS NV INTACT.  PT DOES HAVE A HX OF FALL SEVERAL DAYS AGO  Differential Diagnosis:   DDX:  SCIATICA, HERNIATED DISC, DEGENERATIVE DISC DISEASE, ARTHRITIS, FRACTURE, DISLOCATION  Clinical Tests:   Radiological Study: Ordered and Reviewed  ED Management:  IM VALIUM AND TORADOL  XRAY IS NEG FOR ACUTE FINDINGS  PT MED LIST HAS BEEN UPDATED.  SHE IS CURRENTLY ON A NSAID.  SHE IS NOT ON ANY MUSCLE RELAXER OR NARCOTIC. I WILL RX PREDNISONE TAPER AND ROBAXIN.  PT IS INSTRUCTED TO F/U WITH PCP OR PAIN MANAGEMENT.                    ED Course     Clinical Impression:   The encounter diagnosis was Acute left-sided low back pain with left-sided sciatica.    Disposition:   Disposition: Discharged  Condition: Stable       Beatriz Springer MD  02/22/17 2807

## 2017-02-24 ENCOUNTER — OFFICE VISIT (OUTPATIENT)
Dept: PAIN MEDICINE | Facility: CLINIC | Age: 37
End: 2017-02-24
Payer: COMMERCIAL

## 2017-02-24 VITALS
SYSTOLIC BLOOD PRESSURE: 146 MMHG | BODY MASS INDEX: 37.17 KG/M2 | WEIGHT: 202 LBS | DIASTOLIC BLOOD PRESSURE: 92 MMHG | HEIGHT: 62 IN | TEMPERATURE: 98 F | HEART RATE: 70 BPM

## 2017-02-24 DIAGNOSIS — M47.816 LUMBAR FACET ARTHROPATHY: ICD-10-CM

## 2017-02-24 DIAGNOSIS — M51.36 DDD (DEGENERATIVE DISC DISEASE), LUMBAR: ICD-10-CM

## 2017-02-24 DIAGNOSIS — M62.830 PARASPINAL MUSCLE SPASM: Primary | ICD-10-CM

## 2017-02-24 PROCEDURE — 99999 PR PBB SHADOW E&M-EST. PATIENT-LVL III: CPT | Mod: PBBFAC,,, | Performed by: NURSE PRACTITIONER

## 2017-02-24 PROCEDURE — 99213 OFFICE O/P EST LOW 20 MIN: CPT | Mod: S$GLB,,, | Performed by: NURSE PRACTITIONER

## 2017-02-24 PROCEDURE — 1160F RVW MEDS BY RX/DR IN RCRD: CPT | Mod: S$GLB,,, | Performed by: NURSE PRACTITIONER

## 2017-02-24 RX ORDER — TIZANIDINE 4 MG/1
4 TABLET ORAL NIGHTLY PRN
Qty: 30 TABLET | Refills: 2 | Status: SHIPPED | OUTPATIENT
Start: 2017-02-24 | End: 2017-03-13 | Stop reason: SDUPTHER

## 2017-02-24 NOTE — PROGRESS NOTES
Subjective:      Patient ID: Sarah Mukherjee is a 37 y.o. female.    Chief Complaint: Hip Pain (left)    Referred by: No ref. provider found     HPI:    Mrs. Mukherjee is a 34-year-old female who presents today with Low back pain.  Her medical history is significant for depression, anxiety, neurogenic bladder and endometriosis, status post total hysterectomy. She reports her back pain is present about 2 and half years.  She has been seeing Dr. Matt for her pain.  He has had multiple injections for her all with limited benefit.  These have included radiofrequency ablations and epidurals.  Her most recent was March 11 of this year.  This procedure actually worsened her pain. Of note, she reports multiple life stressors, including the fact that she has had a hysterectomy which means that she her  cannot have children.   Her pain is described in detail below.    Interval History (1/26/2017):  She returns today for follow up.  She reports that she was doing better until 2 months ago when she start having a new onset of left lower extremity radiating pain.  The pain originates in her low back and radiates down the posterolateral aspect of her left leg to the anterior shin.  This began after she was hunting and sitting for a prolonged period of time in a deer stand.  She had one what sounds like a Left L4 TFESI by Dr. Mike Stewart that caused her pain to be worse.  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    Interval History (2/24/2017):  The patient returns to clinic today for follow up. She reports a fall last weekend that has increased her pain. She did go to the ER which ruled out any fractures. The ER did give her a medrol dose pack and Robaxin. She reports that the Robaxin does not provide any relief. The pain is in her lower back and radiating down the side of her left leg to her knee. The pain is worse with prolonged sitting and standing. She was previously scheduled for  STEWART on 2/7/2017 but had to cancel due to UTI. She reports her UTI is cleared. She did complete her antibiotics. She continues to take Relafen and Gabapentin with benefit. She reports increased muscle spasms in her back. She is using ice with benefit. She denies any bowel or bladder incontinence. Her pain today is 9/10.     Physical Therapy: The extended course of physical therapy last year.  This was helpful while she was going.  She did her home exercise program until a recent bladder infection.  Since that time she's not done her HEP    Non-pharmacologic Treatment: Rest and heat make her pain better.         · TENS? Not tried    Pain Medications:         · Currently taking: Norco 10/325 mg 3 times daily.  She occasionally takes OTC NSAIDs as needed. It is somewhat helpful.    · Has tried in the past:  Hydrocodone/acetaminophen was not helpful.  She recently tried gabapentin 100 mg daily with no benefit.      · Has not tried: TCAs, SNRIs, cream    Blood thinners: None    Interventional Therapies:   · Multiple epidural steroid injections and radiofrequency ablation.  · Left L4 TFESI: worsening pain    Relevant Surgeries: She has had a hysterectomy    Affecting sleep? Yes    Affecting daily activities? Yes    Depressive symptoms? Yes          · SI/HI? No    Work status: She is disabled    Pain Scales  Best: 4/10  Worst: 9/10  Usually: 7/10  Today: 7/10    Low-back Pain   This is a chronic problem. The current episode started more than 1 year ago. The problem occurs constantly. The problem has been gradually worsening since onset. The pain is present in the lumbar spine. The pain does not radiate. Quality: Sharp and Deep. The pain is at a severity of 7/10. The pain is moderate. The pain is the same all the time. The symptoms are aggravated by standing and bending (Walking and Getting out of bed or chair). She has tried injection treatment (Medication and Laying Down) for the symptoms. The treatment provided moderate  relief.     Review of Systems   Musculoskeletal: Positive for back pain.   Psychiatric/Behavioral: Positive for depression. The patient is nervous/anxious.    All other systems reviewed and are negative.      Past Medical History:   Diagnosis Date    Cancer     skin cancer removed    Crohn's disease     Degenerative joint disease of spine     Esophagitis     History of stomach ulcers     Lumbar facet arthropathy 6/20/2014    Neurogenic bladder        Past Surgical History:   Procedure Laterality Date    BREAST RECONSTRUCTION      HYSTERECTOMY      total hysterectomy in 20s due to fibroid, endometriosis    MASTECTOMY      PELVIC LAPAROSCOPY      TONSILLECTOMY         Review of patient's allergies indicates:   Allergen Reactions    Penicillins Hives       Current Outpatient Prescriptions   Medication Sig Dispense Refill    alprazolam (XANAX) 0.5 MG tablet Take 1 tablet (0.5 mg total) by mouth 2 (two) times daily. as needed for anxiety. 60 tablet 2    benzonatate (TESSALON) 200 MG capsule Take 200 mg by mouth 3 (three) times daily as needed for Cough.      doxycycline (DORYX) 100 MG EC tablet Take 100 mg by mouth 2 (two) times daily.      estradiol (ESTRACE) 2 MG tablet Take 1 tablet (2 mg total) by mouth once daily. 90 tablet 1    gabapentin (NEURONTIN) 300 MG capsule Increase as directed until taking 1 capsule TID 90 capsule 5    guaifenesin-codeine 100-10 mg/5 ml (TUSSI-ORGANIDIN NR)  mg/5 mL syrup Take 5 mLs by mouth 3 (three) times daily as needed for Cough.      hydrocodone-acetaminophen 10-325mg (NORCO)  mg Tab TK 1 T PO Q 8 H PRN P  0    methocarbamol (ROBAXIN) 750 MG Tab Take 2 tablets (1,500 mg total) by mouth 3 (three) times daily. 30 tablet 0    nabumetone (RELAFEN) 500 MG tablet Take 1 tablet (500 mg total) by mouth 2 (two) times daily as needed for Pain. 60 tablet 5    predniSONE (DELTASONE) 10 MG tablet Take 1 tablet (10 mg total) by mouth once daily. Take 4 tabs x 3  "days, then  Take 2 tabs x 3 days, then   Take 1 tab x 3 days. 21 tablet 0    sertraline (ZOLOFT) 100 MG tablet Take 1 tablet (100 mg total) by mouth once daily. 90 tablet 3    tizanidine (ZANAFLEX) 2 MG tablet TK 1 T PO QHS PRF SPASMS  1    tizanidine 2 mg Cap Take by mouth.      topiramate (TOPAMAX) 25 MG tablet TAKE 1 TABLET BY MOUTH EVERY NIGHT AT BEDTIME FOR 7 DAYS THEN INCREASE TO 2 TABLETS BY MOUTH EVERY NIGHT AT BEDTIME 180 tablet 2    zolpidem (AMBIEN) 5 MG Tab Take 1 tablet (5 mg total) by mouth nightly as needed. 30 tablet 2    sumatriptan (IMITREX) 25 MG Tab Take 1 tablet (25 mg total) by mouth every 2 (two) hours as needed. 10 tablet 5     No current facility-administered medications for this visit.        Family History   Problem Relation Age of Onset    Diabetes Mother     Hypertension Mother     Breast cancer Maternal Aunt 47    Ovarian cancer Maternal Aunt        Social History     Social History    Marital status:      Spouse name: N/A    Number of children: N/A    Years of education: N/A     Occupational History    Not on file.     Social History Main Topics    Smoking status: Never Smoker    Smokeless tobacco: Not on file    Alcohol use Yes      Comment: occ    Drug use: No    Sexual activity: Yes     Other Topics Concern    Not on file     Social History Narrative           Objective:      Vitals:    02/24/17 0907   Temp: 98 °F (36.7 °C)   TempSrc: Oral   Weight: 91.6 kg (202 lb)   Height: 5' 2" (1.575 m)   PainSc:   9   PainLoc: Hip       Ortho/SPM Exam    GEN:  Well developed, well nourished.  No acute distress. No pain behavior.  HEENT:  No trauma.  Mucous membranes moist.  Nares patent bilaterally.  PSYCH: Normal affect. Thought content appropriate.  CHEST:  Breathing symmetric.  No audible wheezing.  ABD: Soft, non-tender, non-distended.  SKIN:  Warm, pink, dry.  No rash on exposed areas.    EXT:  No cyanosis, clubbing, or edema.  No color change or changes in nail " or hair growth.  NEURO/MUSCULOSKELETAL:  Fully alert, oriented, and appropriate. Speech normal belia. No cranial nerve deficits.   Gait: Normal.  Present trendelenburg sign bilaterally.   Motor Strength: 5/5 motor strength throughout lower extremities.   Sensory: No sensory deficit in the lower extremities.   Reflexes:  2+ and symmetric throughout.  Downgoing Babinski's bilaterally.  No clonus or spasticity.  L-Spine:  Decreased ROM with pain on flexion and extension. Positive facet loading bilaterally.  Negative SLR left.  Negative femoral stretch on left  SI Joint/Hip: Negative CHANDNI bilaterally.  Negative FADIR bilaterally.  TTP over left>right lumbar paraspinals and bilateral SI joints.  No TTP over hips, piriformis muscles, or GTB.        Imaging:      Result Narrative       MRI lumbar spine without contrast.    Comparison: None.    Technique: Sagittal T1, T2, stir and axial T2 and T1-weighted images were obtained. The patient received 20 cc of Omniscan IV contrast .    Results: The alignment of the lumbar spine is normal. Vertebral body heights and disk spaces are relatively well maintained. No evidence of malignant bone marrow replacement process or infection. The conus medullaris terminates in good position. Very  mild disk desiccation seen at L4-5 with very minimal disk bulge and small left foraminal disk protrusion. No there is no central canal or foraminal stenosis is seen at any level. There is mild facet joint degenerative changes at L5 S1. The paraspinal  soft tissues appear normal. Following the administration of IV gadolinium contrast, no areas of abnormal enhancement seen to suggest an inflammatory, infectious or tumor process.          Result Impression       Subtle left foraminal disk protrusion at L4-L5 causing no significant mass effect on the exiting nerve root. There is no severe central canal or foraminal stenosis.         Assessment:       Encounter Diagnoses   Name Primary?    Paraspinal  muscle spasm Yes    DDD (degenerative disc disease), lumbar     Lumbar facet arthropathy          Plan:       Sarah was seen today for hip pain.    Diagnoses and all orders for this visit:    Paraspinal muscle spasm  -     tizanidine (ZANAFLEX) 4 MG tablet; Take 1 tablet (4 mg total) by mouth nightly as needed.    DDD (degenerative disc disease), lumbar    Lumbar facet arthropathy      I discussed the treatment options with her today, including risks, benefits, and alternatives. All available images were reviewed. The patient is aware of the risks and benefits of the medications being prescribed, common side effects, and proper usage.    1. Keep injection scheduled for 3/7/2017.   2. Continue Relafen and Gabapentin.  3. Trial Zanaflex 4 mg at bedtime PRN muscle spasm.    4. After this injection, we will send a referral to PT.  5. I have stressed the importance of physical activity and a home exercise plan to help with pain and improve overall health.  6. RTC 2 weeks after above injection.      - Dr. Burt was consulted on the patient and agrees with this plan.    The above plan and management options were discussed at length with patient. Patient is in agreement with the above and verbalized understanding.     Jamia Loaiza, NATALEE  02/24/2017

## 2017-03-06 DIAGNOSIS — G47.9 SLEEP DISTURBANCE: ICD-10-CM

## 2017-03-06 RX ORDER — ZOLPIDEM TARTRATE 5 MG/1
TABLET ORAL
Qty: 30 TABLET | Refills: 0 | OUTPATIENT
Start: 2017-03-06

## 2017-03-06 RX ORDER — ALPRAZOLAM 0.5 MG/1
TABLET ORAL
Qty: 60 TABLET | Refills: 0 | OUTPATIENT
Start: 2017-03-06

## 2017-03-07 ENCOUNTER — HOSPITAL ENCOUNTER (OUTPATIENT)
Facility: OTHER | Age: 37
Discharge: HOME | End: 2017-03-07
Attending: ANESTHESIOLOGY | Admitting: ANESTHESIOLOGY
Payer: COMMERCIAL

## 2017-03-07 ENCOUNTER — SURGERY (OUTPATIENT)
Age: 37
End: 2017-03-07

## 2017-03-07 VITALS
RESPIRATION RATE: 18 BRPM | WEIGHT: 202 LBS | HEART RATE: 64 BPM | TEMPERATURE: 99 F | OXYGEN SATURATION: 98 % | SYSTOLIC BLOOD PRESSURE: 130 MMHG | DIASTOLIC BLOOD PRESSURE: 84 MMHG | HEIGHT: 62 IN | BODY MASS INDEX: 37.17 KG/M2

## 2017-03-07 DIAGNOSIS — F41.9 ANXIETY: ICD-10-CM

## 2017-03-07 DIAGNOSIS — M51.36 DDD (DEGENERATIVE DISC DISEASE), LUMBAR: Primary | ICD-10-CM

## 2017-03-07 PROCEDURE — 63600175 PHARM REV CODE 636 W HCPCS: Performed by: ANESTHESIOLOGY

## 2017-03-07 PROCEDURE — 62322 NJX INTERLAMINAR LMBR/SAC: CPT | Performed by: ANESTHESIOLOGY

## 2017-03-07 PROCEDURE — 99152 MOD SED SAME PHYS/QHP 5/>YRS: CPT | Mod: ,,, | Performed by: ANESTHESIOLOGY

## 2017-03-07 PROCEDURE — 25000003 PHARM REV CODE 250: Performed by: ANESTHESIOLOGY

## 2017-03-07 PROCEDURE — 62323 NJX INTERLAMINAR LMBR/SAC: CPT | Mod: ,,, | Performed by: ANESTHESIOLOGY

## 2017-03-07 PROCEDURE — 25500020 PHARM REV CODE 255: Performed by: ANESTHESIOLOGY

## 2017-03-07 PROCEDURE — 62323 NJX INTERLAMINAR LMBR/SAC: CPT | Performed by: ANESTHESIOLOGY

## 2017-03-07 PROCEDURE — 77003 FLUOROGUIDE FOR SPINE INJECT: CPT | Performed by: ANESTHESIOLOGY

## 2017-03-07 RX ORDER — FENTANYL CITRATE 50 UG/ML
INJECTION, SOLUTION INTRAMUSCULAR; INTRAVENOUS
Status: DISCONTINUED | OUTPATIENT
Start: 2017-03-07 | End: 2017-03-07 | Stop reason: HOSPADM

## 2017-03-07 RX ORDER — SODIUM CHLORIDE 9 MG/ML
INJECTION, SOLUTION INTRAMUSCULAR; INTRAVENOUS; SUBCUTANEOUS
Status: DISCONTINUED | OUTPATIENT
Start: 2017-03-07 | End: 2017-03-07 | Stop reason: HOSPADM

## 2017-03-07 RX ORDER — MIDAZOLAM HYDROCHLORIDE 1 MG/ML
2 INJECTION INTRAMUSCULAR; INTRAVENOUS CONTINUOUS PRN
Status: DISCONTINUED | OUTPATIENT
Start: 2017-03-07 | End: 2017-03-07 | Stop reason: HOSPADM

## 2017-03-07 RX ORDER — MIDAZOLAM HYDROCHLORIDE 1 MG/ML
INJECTION INTRAMUSCULAR; INTRAVENOUS
Status: DISCONTINUED | OUTPATIENT
Start: 2017-03-07 | End: 2017-03-07 | Stop reason: HOSPADM

## 2017-03-07 RX ORDER — SODIUM CHLORIDE 9 MG/ML
INJECTION, SOLUTION INTRAVENOUS CONTINUOUS
Status: DISCONTINUED | OUTPATIENT
Start: 2017-03-07 | End: 2017-03-07 | Stop reason: HOSPADM

## 2017-03-07 RX ORDER — LIDOCAINE HYDROCHLORIDE 10 MG/ML
10 INJECTION INFILTRATION; PERINEURAL ONCE
Status: COMPLETED | OUTPATIENT
Start: 2017-03-07 | End: 2017-03-07

## 2017-03-07 RX ORDER — METHYLPREDNISOLONE ACETATE 80 MG/ML
80 INJECTION, SUSPENSION INTRA-ARTICULAR; INTRALESIONAL; INTRAMUSCULAR; SOFT TISSUE ONCE
Status: DISCONTINUED | OUTPATIENT
Start: 2017-03-07 | End: 2017-03-07 | Stop reason: HOSPADM

## 2017-03-07 RX ORDER — METHYLPREDNISOLONE ACETATE 80 MG/ML
INJECTION, SUSPENSION INTRA-ARTICULAR; INTRALESIONAL; INTRAMUSCULAR; SOFT TISSUE
Status: DISCONTINUED | OUTPATIENT
Start: 2017-03-07 | End: 2017-03-07 | Stop reason: HOSPADM

## 2017-03-07 RX ORDER — BUPIVACAINE HYDROCHLORIDE 2.5 MG/ML
INJECTION, SOLUTION EPIDURAL; INFILTRATION; INTRACAUDAL
Status: DISCONTINUED | OUTPATIENT
Start: 2017-03-07 | End: 2017-03-07 | Stop reason: HOSPADM

## 2017-03-07 RX ORDER — FENTANYL CITRATE 50 UG/ML
50 INJECTION, SOLUTION INTRAMUSCULAR; INTRAVENOUS ONCE
Status: DISCONTINUED | OUTPATIENT
Start: 2017-03-07 | End: 2017-03-07 | Stop reason: HOSPADM

## 2017-03-07 RX ORDER — BUPIVACAINE HYDROCHLORIDE 2.5 MG/ML
10 INJECTION, SOLUTION EPIDURAL; INFILTRATION; INTRACAUDAL ONCE
Status: DISCONTINUED | OUTPATIENT
Start: 2017-03-07 | End: 2017-03-07 | Stop reason: HOSPADM

## 2017-03-07 RX ADMIN — FENTANYL CITRATE 25 MCG: 50 INJECTION, SOLUTION INTRAMUSCULAR; INTRAVENOUS at 08:03

## 2017-03-07 RX ADMIN — IOHEXOL 50 ML: 300 INJECTION, SOLUTION INTRAVENOUS at 08:03

## 2017-03-07 RX ADMIN — BUPIVACAINE HYDROCHLORIDE 10 ML: 2.5 INJECTION, SOLUTION EPIDURAL; INFILTRATION; INTRACAUDAL; PERINEURAL at 08:03

## 2017-03-07 RX ADMIN — MIDAZOLAM HYDROCHLORIDE 1 MG: 1 INJECTION, SOLUTION INTRAMUSCULAR; INTRAVENOUS at 08:03

## 2017-03-07 RX ADMIN — LIDOCAINE HYDROCHLORIDE 20 ML: 10 INJECTION, SOLUTION INFILTRATION; PERINEURAL at 08:03

## 2017-03-07 RX ADMIN — SODIUM CHLORIDE 10 ML: 9 INJECTION, SOLUTION INTRAMUSCULAR; INTRAVENOUS; SUBCUTANEOUS at 08:03

## 2017-03-07 RX ADMIN — MIDAZOLAM HYDROCHLORIDE 0.5 MG: 1 INJECTION, SOLUTION INTRAMUSCULAR; INTRAVENOUS at 08:03

## 2017-03-07 RX ADMIN — SODIUM CHLORIDE: 0.9 INJECTION, SOLUTION INTRAVENOUS at 07:03

## 2017-03-07 RX ADMIN — SODIUM BICARBONATE 5 ML: 0.2 INJECTION, SOLUTION INTRAVENOUS at 08:03

## 2017-03-07 RX ADMIN — METHYLPREDNISOLONE ACETATE 80 MG: 80 INJECTION, SUSPENSION INTRA-ARTICULAR; INTRALESIONAL; INTRAMUSCULAR; SOFT TISSUE at 08:03

## 2017-03-07 NOTE — H&P (VIEW-ONLY)
Subjective:      Patient ID: Sarah Mukherjee is a 37 y.o. female.    Chief Complaint: Hip Pain (left)    Referred by: No ref. provider found     HPI:    Mrs. Mukherjee is a 34-year-old female who presents today with Low back pain.  Her medical history is significant for depression, anxiety, neurogenic bladder and endometriosis, status post total hysterectomy. She reports her back pain is present about 2 and half years.  She has been seeing Dr. Matt for her pain.  He has had multiple injections for her all with limited benefit.  These have included radiofrequency ablations and epidurals.  Her most recent was March 11 of this year.  This procedure actually worsened her pain. Of note, she reports multiple life stressors, including the fact that she has had a hysterectomy which means that she her  cannot have children.   Her pain is described in detail below.    Interval History (1/26/2017):  She returns today for follow up.  She reports that she was doing better until 2 months ago when she start having a new onset of left lower extremity radiating pain.  The pain originates in her low back and radiates down the posterolateral aspect of her left leg to the anterior shin.  This began after she was hunting and sitting for a prolonged period of time in a deer stand.  She had one what sounds like a Left L4 TFESI by Dr. Mike Stewart that caused her pain to be worse.  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    Interval History (2/24/2017):  The patient returns to clinic today for follow up. She reports a fall last weekend that has increased her pain. She did go to the ER which ruled out any fractures. The ER did give her a medrol dose pack and Robaxin. She reports that the Robaxin does not provide any relief. The pain is in her lower back and radiating down the side of her left leg to her knee. The pain is worse with prolonged sitting and standing. She was previously scheduled for  [facility] on 2/7/2017 but had to cancel due to UTI. She reports her UTI is cleared. She did complete her antibiotics. She continues to take Relafen and Gabapentin with benefit. She reports increased muscle spasms in her back. She is using ice with benefit. She denies any bowel or bladder incontinence. Her pain today is 9/10.     Physical Therapy: The extended course of physical therapy last year.  This was helpful while she was going.  She did her home exercise program until a recent bladder infection.  Since that time she's not done her HEP    Non-pharmacologic Treatment: Rest and heat make her pain better.         · TENS? Not tried    Pain Medications:         · Currently taking: Norco 10/325 mg 3 times daily.  She occasionally takes OTC NSAIDs as needed. It is somewhat helpful.    · Has tried in the past:  Hydrocodone/acetaminophen was not helpful.  She recently tried gabapentin 100 mg daily with no benefit.      · Has not tried: TCAs, SNRIs, cream    Blood thinners: None    Interventional Therapies:   · Multiple epidural steroid injections and radiofrequency ablation.  · Left L4 TFESI: worsening pain    Relevant Surgeries: She has had a hysterectomy    Affecting sleep? Yes    Affecting daily activities? Yes    Depressive symptoms? Yes          · SI/HI? No    Work status: She is disabled    Pain Scales  Best: 4/10  Worst: 9/10  Usually: 7/10  Today: 7/10    Low-back Pain   This is a chronic problem. The current episode started more than 1 year ago. The problem occurs constantly. The problem has been gradually worsening since onset. The pain is present in the lumbar spine. The pain does not radiate. Quality: Sharp and Deep. The pain is at a severity of 7/10. The pain is moderate. The pain is the same all the time. The symptoms are aggravated by standing and bending (Walking and Getting out of bed or chair). She has tried injection treatment (Medication and Laying Down) for the symptoms. The treatment provided moderate  relief.     Review of Systems   Musculoskeletal: Positive for back pain.   Psychiatric/Behavioral: Positive for depression. The patient is nervous/anxious.    All other systems reviewed and are negative.      Past Medical History:   Diagnosis Date    Cancer     skin cancer removed    Crohn's disease     Degenerative joint disease of spine     Esophagitis     History of stomach ulcers     Lumbar facet arthropathy 6/20/2014    Neurogenic bladder        Past Surgical History:   Procedure Laterality Date    BREAST RECONSTRUCTION      HYSTERECTOMY      total hysterectomy in 20s due to fibroid, endometriosis    MASTECTOMY      PELVIC LAPAROSCOPY      TONSILLECTOMY         Review of patient's allergies indicates:   Allergen Reactions    Penicillins Hives       Current Outpatient Prescriptions   Medication Sig Dispense Refill    alprazolam (XANAX) 0.5 MG tablet Take 1 tablet (0.5 mg total) by mouth 2 (two) times daily. as needed for anxiety. 60 tablet 2    benzonatate (TESSALON) 200 MG capsule Take 200 mg by mouth 3 (three) times daily as needed for Cough.      doxycycline (DORYX) 100 MG EC tablet Take 100 mg by mouth 2 (two) times daily.      estradiol (ESTRACE) 2 MG tablet Take 1 tablet (2 mg total) by mouth once daily. 90 tablet 1    gabapentin (NEURONTIN) 300 MG capsule Increase as directed until taking 1 capsule TID 90 capsule 5    guaifenesin-codeine 100-10 mg/5 ml (TUSSI-ORGANIDIN NR)  mg/5 mL syrup Take 5 mLs by mouth 3 (three) times daily as needed for Cough.      hydrocodone-acetaminophen 10-325mg (NORCO)  mg Tab TK 1 T PO Q 8 H PRN P  0    methocarbamol (ROBAXIN) 750 MG Tab Take 2 tablets (1,500 mg total) by mouth 3 (three) times daily. 30 tablet 0    nabumetone (RELAFEN) 500 MG tablet Take 1 tablet (500 mg total) by mouth 2 (two) times daily as needed for Pain. 60 tablet 5    predniSONE (DELTASONE) 10 MG tablet Take 1 tablet (10 mg total) by mouth once daily. Take 4 tabs x 3  "days, then  Take 2 tabs x 3 days, then   Take 1 tab x 3 days. 21 tablet 0    sertraline (ZOLOFT) 100 MG tablet Take 1 tablet (100 mg total) by mouth once daily. 90 tablet 3    tizanidine (ZANAFLEX) 2 MG tablet TK 1 T PO QHS PRF SPASMS  1    tizanidine 2 mg Cap Take by mouth.      topiramate (TOPAMAX) 25 MG tablet TAKE 1 TABLET BY MOUTH EVERY NIGHT AT BEDTIME FOR 7 DAYS THEN INCREASE TO 2 TABLETS BY MOUTH EVERY NIGHT AT BEDTIME 180 tablet 2    zolpidem (AMBIEN) 5 MG Tab Take 1 tablet (5 mg total) by mouth nightly as needed. 30 tablet 2    sumatriptan (IMITREX) 25 MG Tab Take 1 tablet (25 mg total) by mouth every 2 (two) hours as needed. 10 tablet 5     No current facility-administered medications for this visit.        Family History   Problem Relation Age of Onset    Diabetes Mother     Hypertension Mother     Breast cancer Maternal Aunt 47    Ovarian cancer Maternal Aunt        Social History     Social History    Marital status:      Spouse name: N/A    Number of children: N/A    Years of education: N/A     Occupational History    Not on file.     Social History Main Topics    Smoking status: Never Smoker    Smokeless tobacco: Not on file    Alcohol use Yes      Comment: occ    Drug use: No    Sexual activity: Yes     Other Topics Concern    Not on file     Social History Narrative           Objective:      Vitals:    02/24/17 0907   Temp: 98 °F (36.7 °C)   TempSrc: Oral   Weight: 91.6 kg (202 lb)   Height: 5' 2" (1.575 m)   PainSc:   9   PainLoc: Hip       Ortho/SPM Exam    GEN:  Well developed, well nourished.  No acute distress. No pain behavior.  HEENT:  No trauma.  Mucous membranes moist.  Nares patent bilaterally.  PSYCH: Normal affect. Thought content appropriate.  CHEST:  Breathing symmetric.  No audible wheezing.  ABD: Soft, non-tender, non-distended.  SKIN:  Warm, pink, dry.  No rash on exposed areas.    EXT:  No cyanosis, clubbing, or edema.  No color change or changes in nail " or hair growth.  NEURO/MUSCULOSKELETAL:  Fully alert, oriented, and appropriate. Speech normal eblia. No cranial nerve deficits.   Gait: Normal.  Present trendelenburg sign bilaterally.   Motor Strength: 5/5 motor strength throughout lower extremities.   Sensory: No sensory deficit in the lower extremities.   Reflexes:  2+ and symmetric throughout.  Downgoing Babinski's bilaterally.  No clonus or spasticity.  L-Spine:  Decreased ROM with pain on flexion and extension. Positive facet loading bilaterally.  Negative SLR left.  Negative femoral stretch on left  SI Joint/Hip: Negative CHANDNI bilaterally.  Negative FADIR bilaterally.  TTP over left>right lumbar paraspinals and bilateral SI joints.  No TTP over hips, piriformis muscles, or GTB.        Imaging:      Result Narrative       MRI lumbar spine without contrast.    Comparison: None.    Technique: Sagittal T1, T2, stir and axial T2 and T1-weighted images were obtained. The patient received 20 cc of Omniscan IV contrast .    Results: The alignment of the lumbar spine is normal. Vertebral body heights and disk spaces are relatively well maintained. No evidence of malignant bone marrow replacement process or infection. The conus medullaris terminates in good position. Very  mild disk desiccation seen at L4-5 with very minimal disk bulge and small left foraminal disk protrusion. No there is no central canal or foraminal stenosis is seen at any level. There is mild facet joint degenerative changes at L5 S1. The paraspinal  soft tissues appear normal. Following the administration of IV gadolinium contrast, no areas of abnormal enhancement seen to suggest an inflammatory, infectious or tumor process.          Result Impression       Subtle left foraminal disk protrusion at L4-L5 causing no significant mass effect on the exiting nerve root. There is no severe central canal or foraminal stenosis.         Assessment:       Encounter Diagnoses   Name Primary?    Paraspinal  muscle spasm Yes    DDD (degenerative disc disease), lumbar     Lumbar facet arthropathy          Plan:       Sarah was seen today for hip pain.    Diagnoses and all orders for this visit:    Paraspinal muscle spasm  -     tizanidine (ZANAFLEX) 4 MG tablet; Take 1 tablet (4 mg total) by mouth nightly as needed.    DDD (degenerative disc disease), lumbar    Lumbar facet arthropathy      I discussed the treatment options with her today, including risks, benefits, and alternatives. All available images were reviewed. The patient is aware of the risks and benefits of the medications being prescribed, common side effects, and proper usage.    1. Keep injection scheduled for 3/7/2017.   2. Continue Relafen and Gabapentin.  3. Trial Zanaflex 4 mg at bedtime PRN muscle spasm.    4. After this injection, we will send a referral to PT.  5. I have stressed the importance of physical activity and a home exercise plan to help with pain and improve overall health.  6. RTC 2 weeks after above injection.      - Dr. Burt was consulted on the patient and agrees with this plan.    The above plan and management options were discussed at length with patient. Patient is in agreement with the above and verbalized understanding.     Jamia Loaiza, NATALEE  02/24/2017

## 2017-03-07 NOTE — PLAN OF CARE
Problem: Patient Care Overview  Goal: Plan of Care Review  Pt tolerated procedure well. Pt reports 3/10 pain after procedure. Patient complains of soreness at injection site. Assisted pt up for first time. Steady on feet. All discharge instructions given.

## 2017-03-07 NOTE — IP AVS SNAPSHOT
[facility] Location ([facility])  [address] [place], [place] [address]  Phone: [phone]           Patient Discharge Instructions     Our goal is to set you up for success. This packet includes information on your condition, medications, and your home care. It will help you to care for yourself so you don't get sicker and need to go back to the hospital.     Please ask your nurse if you have any questions.        There are many details to remember when preparing to leave the hospital. Here is what you will need to do:    1. Take your medicine. If you are prescribed medications, review your Medication List in the following pages. You may have new medications to  at the pharmacy and others that you'll need to stop taking. Review the instructions for how and when to take your medications. Talk with your doctor or nurses if you are unsure of what to do.     2. Go to your follow-up appointments. Specific follow-up information is listed in the following pages. Your may be contacted by a transition nurse or clinical provider about future appointments. Be sure we have all of the phone numbers to reach you, if needed. Please contact your provider's office if you are unable to make an appointment.     3. Watch for warning signs. Your doctor or nurse will give you detailed warning signs to watch for and when to call for assistance. These instructions may also include educational information about your condition. If you experience any of warning signs to your health, call your doctor.               [facility]  Unless otherwise directed by your provider, please contact [facility], our nurse care line that is available for 24/7 assistance.     [phone] (toll-free)    Registered nurses in the [facility] provide clinical advisement, health education, appointment booking, and other advisory services.                    ** Verify the list of medication(s) below is accurate and up to  date. Carry this with you in case of emergency. If your medications have changed, please notify your healthcare provider.             Medication List      ASK your doctor about these medications        Additional Info                      alprazolam 0.5 MG tablet   Commonly known as:  XANAX   Quantity:  60 tablet   Refills:  2   Dose:  0.5 mg    Instructions:  Take 1 tablet (0.5 mg total) by mouth 2 (two) times daily. as needed for anxiety.     Begin Date    AM    Noon    PM    Bedtime       benzonatate 200 MG capsule   Commonly known as:  TESSALON   Refills:  0   Dose:  200 mg    Instructions:  Take 200 mg by mouth 3 (three) times daily as needed for Cough.     Begin Date    AM    Noon    PM    Bedtime       doxycycline 100 MG EC tablet   Commonly known as:  DORYX   Refills:  0   Dose:  100 mg    Instructions:  Take 100 mg by mouth 2 (two) times daily.     Begin Date    AM    Noon    PM    Bedtime       estradiol 2 MG tablet   Commonly known as:  ESTRACE   Quantity:  90 tablet   Refills:  1   Dose:  2 mg    Instructions:  Take 1 tablet (2 mg total) by mouth once daily.     Begin Date    AM    Noon    PM    Bedtime       gabapentin 300 MG capsule   Commonly known as:  NEURONTIN   Quantity:  90 capsule   Refills:  5    Instructions:  Increase as directed until taking 1 capsule TID     Begin Date    AM    Noon    PM    Bedtime       guaifenesin-codeine 100-10 mg/5 ml  mg/5 mL syrup   Commonly known as:  TUSSI-ORGANIDIN NR   Refills:  0   Dose:  5 mL    Instructions:  Take 5 mLs by mouth 3 (three) times daily as needed for Cough.     Begin Date    AM    Noon    PM    Bedtime       hydrocodone-acetaminophen 10-325mg  mg Tab   Commonly known as:  NORCO   Refills:  0    Instructions:  TK 1 T PO Q 8 H PRN P     Begin Date    AM    Noon    PM    Bedtime       nabumetone 500 MG tablet   Commonly known as:  RELAFEN   Quantity:  60 tablet   Refills:  5   Dose:  500 mg    Instructions:  Take 1 tablet (500 mg total)  by mouth 2 (two) times daily as needed for Pain.     Begin Date    AM    Noon    PM    Bedtime       sertraline 100 MG tablet   Commonly known as:  ZOLOFT   Quantity:  90 tablet   Refills:  3   Dose:  100 mg   Comments:  **Patient requests 90 days supply**    Instructions:  Take 1 tablet (100 mg total) by mouth once daily.     Begin Date    AM    Noon    PM    Bedtime       sumatriptan 25 MG Tab   Commonly known as:  IMITREX   Quantity:  10 tablet   Refills:  5   Dose:  25 mg    Instructions:  Take 1 tablet (25 mg total) by mouth every 2 (two) hours as needed.     Begin Date    AM    Noon    PM    Bedtime       tizanidine 4 MG tablet   Commonly known as:  ZANAFLEX   Quantity:  30 tablet   Refills:  2   Dose:  4 mg    Instructions:  Take 1 tablet (4 mg total) by mouth nightly as needed.     Begin Date    AM    Noon    PM    Bedtime       topiramate 25 MG tablet   Commonly known as:  TOPAMAX   Quantity:  180 tablet   Refills:  2   Comments:  **Patient requests 90 days supply**    Instructions:  TAKE 1 TABLET BY MOUTH EVERY NIGHT AT BEDTIME FOR 7 DAYS THEN INCREASE TO 2 TABLETS BY MOUTH EVERY NIGHT AT BEDTIME     Begin Date    AM    Noon    PM    Bedtime       zolpidem 5 MG Tab   Commonly known as:  AMBIEN   Quantity:  30 tablet   Refills:  2   Dose:  5 mg    Instructions:  Take 1 tablet (5 mg total) by mouth nightly as needed.     Begin Date    AM    Noon    PM    Bedtime                  Please bring to all follow up appointments:    1. A copy of your discharge instructions.  2. All medicines you are currently taking in their original bottles.  3. Identification and insurance card.    Please arrive 15 minutes ahead of scheduled appointment time.    Please call 24 hours in advance if you must reschedule your appointment and/or time.        Your Scheduled Appointments     Mar 29, 2017  7:15 AM CDT   Established Patient Visit with Andra Burt MD   [facility]    [address]  "[facility] [address]   [phone]            Mar 29, 2017 10:00 AM CDT   Neurosurgery Consult with Fadi Sullivan MD   [facility] [place] [facility] ([facility] [place])    [address]  [address]  [facility] [address]   [phone]                  Discharge Instructions       Home Care Instructions Pain Management:    1. DIET:   You may resume your normal diet today.   2. BATHING:   You may shower with luke warm water.  3. DRESSING:   You may remove your bandage today.   4. ACTIVITY LEVEL:   You may resume your normal activities 24 hrs after your procedure.  5. MEDICATIONS:   You may resume your normal medications today.   6. SPECIAL INSTRUCTIONS:   No heat to the injection site for 24 hrs including, bath or shower, heating pad, moist heat, or hot tubs.    Use ice pack to injection site for any pain or discomfort.  Apply ice packs for 20 minute intervals as needed.   If you have received any sedatives by mouth today you may not drive for 12 hours.    If you have received any sedation through your IV, you may not drive for 24 hrs.     PLEASE CALL YOUR DOCTOR IF:  1. Redness or swelling around the injection site.  2. Fever of 101 degrees  3. Drainage (pus) from the injection site.  4. For any continuous bleeding (some dried blood over the incision is normal.)    FOR EMERGENCIES:   If any unusual problems or difficulties occur during clinic hours, call [phone] or [phone].         Admission Information     Date & Time Provider Department [facility]    3/7/2017  7:08 AM Andra Burt MD [facility] [id number]      Care Providers     Provider Role Specialty Primary office phone    Andra Burt MD Attending Provider Pain Medicine [phone]    Andra Burt MD Surgeon  Pain Medicine [phone]      Your Vitals Were     BP Pulse Temp Resp Height Weight    139/88 72 98.6 °F (37 °C) (Oral) 18 5' 2" (1.575 m) 91.6 kg (202 lb)    SpO2 BMI             100% 36.95 kg/m2         Recent Lab Values     No lab " values to display.      Allergies as of 3/7/2017        Reactions    Penicillins Hives      Advance Directives     An advance directive is a document which, in the event you are no longer able to make decisions for yourself, tells your healthcare team what kind of treatment you do or do not want to receive, or who you would like to make those decisions for you.  If you do not currently have an advance directive, [facility] encourages you to create one.  For more information call:  [phone]), [phone] ([phone]),  or log on to [email or web address].        Language Assistance Services     ATTENTION: Language assistance services are available, free of charge. Please call [phone].      ATENCIÓN: Si habla español, tiene a li disposición servicios gratuitos de asistencia lingüística. Llame al [phone].     CHÚ Ý: N?u b?n nói Ti?ng Vi?t, có các d?ch v? h? tr? ngôn ng? mi?n phí dành cho b?n. G?i s? [phone].         [facility] complies with applicable Federal civil rights laws and does not discriminate on the basis of race, color, national origin, age, disability, or sex.

## 2017-03-07 NOTE — DISCHARGE INSTRUCTIONS

## 2017-03-07 NOTE — DISCHARGE SUMMARY
Discharge Note  Short Stay      SUMMARY     Admit Date: 3/7/2017    Attending Physician: Andra Burt      Discharge Physician: Andra Burt      Discharge Date: 3/7/2017 9:07 AM    Final Diagnosis: lumbar facet arthritis    Disposition: Home or self care    Patient Instructions:   Discharge Medication List as of 3/7/2017  8:30 AM      CONTINUE these medications which have NOT CHANGED    Details   alprazolam (XANAX) 0.5 MG tablet Take 1 tablet (0.5 mg total) by mouth 2 (two) times daily. as needed for anxiety., Starting 11/3/2016, Until Discontinued, Print      benzonatate (TESSALON) 200 MG capsule Take 200 mg by mouth 3 (three) times daily as needed for Cough., Until Discontinued, Historical Med      estradiol (ESTRACE) 2 MG tablet Take 1 tablet (2 mg total) by mouth once daily., Starting 12/1/2016, Until Discontinued, Normal      gabapentin (NEURONTIN) 300 MG capsule Increase as directed until taking 1 capsule TID, Normal      guaifenesin-codeine 100-10 mg/5 ml (TUSSI-ORGANIDIN NR)  mg/5 mL syrup Take 5 mLs by mouth 3 (three) times daily as needed for Cough., Until Discontinued, Historical Med      hydrocodone-acetaminophen 10-325mg (NORCO)  mg Tab TK 1 T PO Q 8 H PRN P, Historical Med      nabumetone (RELAFEN) 500 MG tablet Take 1 tablet (500 mg total) by mouth 2 (two) times daily as needed for Pain., Starting 1/26/2017, Until Tue 7/25/17, Normal      sertraline (ZOLOFT) 100 MG tablet Take 1 tablet (100 mg total) by mouth once daily., Starting 11/3/2016, Until Discontinued, Normal      sumatriptan (IMITREX) 25 MG Tab Take 1 tablet (25 mg total) by mouth every 2 (two) hours as needed., Starting 7/28/2016, Until Thu 1/26/17, Normal      tizanidine (ZANAFLEX) 4 MG tablet Take 1 tablet (4 mg total) by mouth nightly as needed., Starting 2/24/2017, Until Sun 3/26/17, Normal      topiramate (TOPAMAX) 25 MG tablet TAKE 1 TABLET BY MOUTH EVERY NIGHT AT BEDTIME FOR 7 DAYS THEN INCREASE TO 2 TABLETS BY MOUTH  EVERY NIGHT AT BEDTIME, Normal      zolpidem (AMBIEN) 5 MG Tab Take 1 tablet (5 mg total) by mouth nightly as needed., Starting 11/3/2016, Until Discontinued, Print         STOP taking these medications       doxycycline (DORYX) 100 MG EC tablet Comments:   Reason for Stopping:               Resume home diet and activity

## 2017-03-07 NOTE — INTERVAL H&P NOTE
The patient has been examined and the H&P has been reviewed:    I concur with the findings and no changes have occurred since H&P was written.     No change in the location or quality of the pain since the most recent clinic visit.  No new symptoms.  She wishes to proceed with the procedure today.    PE, unchanged from previous:  CV:  RRR  Resp: unlabored, no wheezing.    NPO since MN.      Anesthesia/Surgery risks, benefits and alternative options discussed and understood by patient/family.          Active Hospital Problems    Diagnosis  POA    DDD (degenerative disc disease), lumbar [M51.36]  Yes      Resolved Hospital Problems    Diagnosis Date Resolved POA   No resolved problems to display.

## 2017-03-07 NOTE — OP NOTE
Date of Procedure: 03/07/2017    Procedure: L4/5 Interlaminar Epidural Steroid Injection    Referring Provider: None    Pre-op diagnosis: Lumbar DDD with radiculitis    Post-op diagnosis: Same     Physician: Dr. Andra Burt     Assistant: Dr. Horton    Anesthestia: local/IV sedation: Versed 1.5 mg and fentanyl 50 mcg IV.  Conscious sedation given by MD and monitored by RN.  Total sedation time was less than 30 min.    EBL: None    Specimens: None    All medications, allergies, and relevant histories were reviewed. No recent antibiotics or infections.  A time-out was taken to verify the correct patient, procedure, laterality, and appropriate medications/allergies.    Fluoroscopically-Guided, Contrast-Controlled Lumbar Interlaminar Epidural Steroid Injection:     Following denial of allergy and review of potential side effects and complications including but not necessarily limited to infection, allergic reaction, local tissue breakdown, nerve injury, paresis, paralysis, spinal cord injury, and seizure, the patient indicated they understood and agreed to proceed.     Patient was placed in prone position. Lumbar area was prepped and draped in sterile manner. Local Xylocaine 1% was given subcutaneously at the level L4/5. An 18-gauge Tuohy needle was introduced atraumatically in the interspinous space using a left paramedian approach and advanced up to the epidural space using fluoroscopic guidance in the AP position. Loss of resistance to air was used to identify the epidural space using fluoroscopic guidance in the lateral position. Following negative aspiration for blood and CSF and confirming the absence of paresthesias, injection of approximately 1 cc of Omnipaque 300 demonstrated excellent epidural spread without vascular or intrathecal uptake. At this point, 2cc of 0.25% marcaine with 80 mg of Depo-Medrol was injected without complication. The needle was flushed with 1% lidocaine and withdrawn.     Patient  tolerated the procedure well without any side effects. No noted complications.     The patient was followed post procedure and discharged under their own power in excellent condition in the company of a responsible adult.     Future Management:   If helpful, can repeat as needed.    Follow up with my clinic in 3 weeks or sooner if needed    I certify that I provided the above services.  I was present for the entire procedure, which was performed by myself with the assistance of the resident physician.  There were no parts of the procedure that were performed not by myself or without my direct supervision.

## 2017-03-13 ENCOUNTER — OFFICE VISIT (OUTPATIENT)
Dept: FAMILY MEDICINE | Facility: CLINIC | Age: 37
End: 2017-03-13
Payer: COMMERCIAL

## 2017-03-13 VITALS
SYSTOLIC BLOOD PRESSURE: 120 MMHG | HEIGHT: 62 IN | WEIGHT: 217.63 LBS | OXYGEN SATURATION: 96 % | HEART RATE: 74 BPM | BODY MASS INDEX: 40.05 KG/M2 | DIASTOLIC BLOOD PRESSURE: 80 MMHG | TEMPERATURE: 98 F

## 2017-03-13 DIAGNOSIS — G47.9 SLEEP DISTURBANCE: ICD-10-CM

## 2017-03-13 DIAGNOSIS — M62.830 PARASPINAL MUSCLE SPASM: ICD-10-CM

## 2017-03-13 DIAGNOSIS — F41.9 ANXIETY: ICD-10-CM

## 2017-03-13 PROCEDURE — 1160F RVW MEDS BY RX/DR IN RCRD: CPT | Mod: S$GLB,,, | Performed by: FAMILY MEDICINE

## 2017-03-13 PROCEDURE — 99999 PR PBB SHADOW E&M-EST. PATIENT-LVL III: CPT | Mod: PBBFAC,,, | Performed by: FAMILY MEDICINE

## 2017-03-13 PROCEDURE — 99214 OFFICE O/P EST MOD 30 MIN: CPT | Mod: S$GLB,,, | Performed by: FAMILY MEDICINE

## 2017-03-13 RX ORDER — ESTRADIOL 2 MG/1
2 TABLET ORAL DAILY
Qty: 90 TABLET | Refills: 1 | Status: SHIPPED | OUTPATIENT
Start: 2017-03-13 | End: 2017-03-13 | Stop reason: SDUPTHER

## 2017-03-13 RX ORDER — ZOLPIDEM TARTRATE 5 MG/1
5 TABLET ORAL NIGHTLY PRN
Qty: 30 TABLET | Refills: 2 | Status: SHIPPED | OUTPATIENT
Start: 2017-03-13 | End: 2017-03-13 | Stop reason: SDUPTHER

## 2017-03-13 RX ORDER — SERTRALINE HYDROCHLORIDE 100 MG/1
100 TABLET, FILM COATED ORAL DAILY
Qty: 90 TABLET | Refills: 3 | Status: SHIPPED | OUTPATIENT
Start: 2017-03-13 | End: 2017-03-23 | Stop reason: SDUPTHER

## 2017-03-13 RX ORDER — ZOLPIDEM TARTRATE 5 MG/1
5 TABLET ORAL NIGHTLY PRN
Qty: 30 TABLET | Refills: 2 | Status: SHIPPED | OUTPATIENT
Start: 2017-03-13 | End: 2017-06-15 | Stop reason: SDUPTHER

## 2017-03-13 RX ORDER — ALPRAZOLAM 0.5 MG/1
0.5 TABLET ORAL 2 TIMES DAILY
Qty: 60 TABLET | Refills: 2 | Status: SHIPPED | OUTPATIENT
Start: 2017-03-13 | End: 2017-03-13 | Stop reason: SDUPTHER

## 2017-03-13 RX ORDER — ESTRADIOL 2 MG/1
2 TABLET ORAL DAILY
Qty: 90 TABLET | Refills: 3 | Status: SHIPPED | OUTPATIENT
Start: 2017-03-13 | End: 2017-03-23 | Stop reason: SDUPTHER

## 2017-03-13 RX ORDER — ALPRAZOLAM 0.5 MG/1
0.5 TABLET ORAL 2 TIMES DAILY
Qty: 60 TABLET | Refills: 2 | Status: SHIPPED | OUTPATIENT
Start: 2017-03-13 | End: 2017-06-15 | Stop reason: SDUPTHER

## 2017-03-13 RX ORDER — TIZANIDINE 4 MG/1
4 TABLET ORAL NIGHTLY PRN
Qty: 30 TABLET | Refills: 2 | Status: SHIPPED | OUTPATIENT
Start: 2017-03-13 | End: 2017-03-23 | Stop reason: SDUPTHER

## 2017-03-13 NOTE — PROGRESS NOTES
"Subjective:       Patient ID: Sarah Mukherjee is a 37 y.o. female.    Chief Complaint: Renew Meds    HPI Comments: patient is here for refills of her medications he is on Ambien for sleep and xanax for anxiety.    She also went to Dr. Stewart for back pain. She had a steroid injection in her back on 1/13/17.  She states her back was worsened after this. She state sshe initially had pain radiating down her leg to her knee. She was seen by Dr. Burt and had an injection scheduled for 2/7/17 and had a uti. She had to reschedule the procedure for 3/7/17. She states she received the injection and has had improvement, but no resolve.  She is going tos  neurosurgery for evaluation now. She states the pain into her left buttock is constant.     Review of Systems    Objective:       Vitals:    03/13/17 1416   BP: 120/80   Pulse: 74   Temp: 97.7 °F (36.5 °C)   TempSrc: Oral   SpO2: 96%   Weight: 98.7 kg (217 lb 9.5 oz)   Height: 5' 2" (1.575 m)       Physical Exam   Constitutional: She is oriented to person, place, and time. She appears well-developed and well-nourished. No distress.   HENT:   Head: Normocephalic and atraumatic.   Cardiovascular: Normal rate, regular rhythm and normal heart sounds.  Exam reveals no gallop and no friction rub.    No murmur heard.  Pulmonary/Chest: Effort normal and breath sounds normal. No respiratory distress. She has no wheezes. She has no rales.   Neurological: She is alert and oriented to person, place, and time.   Skin: She is not diaphoretic.   Psychiatric: She has a normal mood and affect.       Assessment:       1. S/P oophorectomy    2. Sleep disturbance    3. Anxiety    4. Paraspinal muscle spasm        Plan:            Sarah was seen today for renew meds.    Diagnoses and all orders for this visit:    S/P oophorectomy  -     estradiol (ESTRACE) 2 MG tablet; Take 1 tablet (2 mg total) by mouth once daily.  Stable. Refilled meds.     Sleep disturbance  -     zolpidem (AMBIEN) 5 MG " Tab; Take 1 tablet (5 mg total) by mouth nightly as needed.  Stable. Refilled meds.     Anxiety  -     alprazolam (XANAX) 0.5 MG tablet; Take 1 tablet (0.5 mg total) by mouth 2 (two) times daily. as needed for anxiety.  -     sertraline (ZOLOFT) 100 MG tablet; Take 1 tablet (100 mg total) by mouth once daily.  -     Comprehensive metabolic panel; Future  -     Lipid panel; Future  -     CBC auto differential; Future  Due for labs and refilled zoloft and xanax.   Paraspinal muscle spasm  -     tizanidine (ZANAFLEX) 4 MG tablet; Take 1 tablet (4 mg total) by mouth nightly as needed.-- called and canceled script as she states she has this already.

## 2017-03-13 NOTE — MR AVS SNAPSHOT
[facility]  [address]  [address]  [place] [address]  Phone: [phone]  Fax: [phone]                  Sarah Mukherjee   3/13/2017 2:00 PM   Office Visit    Description:  Female : 1980   Provider:  Staci Sorenson MD   Department:  [facility]           Reason for Visit     Renew Meds           Diagnoses this Visit        Comments    S/P oophorectomy         Sleep disturbance         Anxiety         Paraspinal muscle spasm                To Do List           Future Appointments        Provider Department Dept Phone    3/27/2017 11:15 AM Andra Burt MD [facility] [phone]    3/29/2017 10:00 AM Fadi Sullivan MD [facility] [phone]      Goals (5 Years of Data)     None       These Medications        Disp Refills Start End    alprazolam (XANAX) 0.5 MG tablet 60 tablet 2 3/13/2017     Take 1 tablet (0.5 mg total) by mouth 2 (two) times daily. as needed for anxiety. - Oral    Pharmacy: [facility] [address] AT [facility] Ph #: [phone]       zolpidem (AMBIEN) 5 MG Tab 30 tablet 2 3/13/2017     Take 1 tablet (5 mg total) by mouth nightly as needed. - Oral    Pharmacy: [facility] [address] AT [facility] Ph #: [phone]       sertraline (ZOLOFT) 100 MG tablet 90 tablet 3 3/13/2017     Take 1 tablet (100 mg total) by mouth once daily. - Oral    Pharmacy: [facility] [address] AT [facility] Ph #: [phone]       Notes to Pharmacy: **Patient requests 90 days supply**    tizanidine (ZANAFLEX) 4 MG tablet 30 tablet 2 3/13/2017 2017    Take 1 tablet (4 mg total) by mouth nightly as needed. - Oral    Pharmacy: [facility] [address] [place] -  [place] AT [facility] [place] Ph #: [phone]       estradiol (ESTRACE) 2 MG tablet 90  tablet 3 3/13/2017     Take 1 tablet (2 mg total) by mouth once daily. - Oral    Pharmacy: [facility] [id number] - [place] [facility] AT [facility] #: [phone]         [facility] On [facility]     [facility] On Call Nurse Care Line - 24/7 Assistance  Registered nurses in the [facility] On Call Center provide clinical advisement, health education, appointment booking, and other advisory services.  Call for this free service at [phone].             Medications           Message regarding Medications     Verify the changes and/or additions to your medication regime listed below are the same as discussed with your clinician today.  If any of these changes or additions are incorrect, please notify your healthcare provider.        STOP taking these medications     benzonatate (TESSALON) 200 MG capsule Take 200 mg by mouth 3 (three) times daily as needed for Cough.    guaifenesin-codeine 100-10 mg/5 ml (TUSSI-ORGANIDIN NR)  mg/5 mL syrup Take 5 mLs by mouth 3 (three) times daily as needed for Cough.    hydrocodone-acetaminophen 10-325mg (NORCO)  mg Tab TK 1 T PO Q 8 H PRN P           Verify that the below list of medications is an accurate representation of the medications you are currently taking.  If none reported, the list may be blank. If incorrect, please contact your healthcare provider. Carry this list with you in case of emergency.           Current Medications     alprazolam (XANAX) 0.5 MG tablet Take 1 tablet (0.5 mg total) by mouth 2 (two) times daily. as needed for anxiety.    gabapentin (NEURONTIN) 300 MG capsule Increase as directed until taking 1 capsule TID    nabumetone (RELAFEN) 500 MG tablet Take 1 tablet (500 mg total) by mouth 2 (two) times daily as needed for Pain.    sertraline (ZOLOFT) 100 MG tablet Take 1 tablet (100 mg total) by mouth once daily.    sumatriptan (IMITREX) 25 MG Tab Take 1 tablet (25 mg total) by mouth every 2 (two) hours as needed.     "tizanidine (ZANAFLEX) 4 MG tablet Take 1 tablet (4 mg total) by mouth nightly as needed.    topiramate (TOPAMAX) 25 MG tablet TAKE 1 TABLET BY MOUTH EVERY NIGHT AT BEDTIME FOR 7 DAYS THEN INCREASE TO 2 TABLETS BY MOUTH EVERY NIGHT AT BEDTIME    zolpidem (AMBIEN) 5 MG Tab Take 1 tablet (5 mg total) by mouth nightly as needed.    estradiol (ESTRACE) 2 MG tablet Take 1 tablet (2 mg total) by mouth once daily.           Clinical Reference Information           Your Vitals Were     BP Pulse Temp Height Weight SpO2    120/80 74 97.7 °F (36.5 °C) (Oral) 5' 2" (1.575 m) 98.7 kg (217 lb 9.5 oz) 96%    BMI                39.8 kg/m2          Blood Pressure          Most Recent Value    BP  120/80      Allergies as of 3/13/2017     Penicillins      Immunizations Administered on Date of Encounter - 3/13/2017     None      Orders Placed During Today's Visit     Future Labs/Procedures Expected by Expires    CBC auto differential  3/13/2017 3/13/2018    Comprehensive metabolic panel  3/13/2017 5/12/2018    Lipid panel  3/13/2017 5/12/2018      Language Assistance Services     ATTENTION: Language assistance services are available, free of charge. Please call [phone].      ATENCIÓN: Si wilson gil, tiene a li disposición servicios gratuitos de asistencia lingüística. Llame al [phone].     ELIZABETH Ý: N?u b?n nói Ti?ng Vi?t, có các d?ch v? h? tr? ngôn ng? mi?n phí dành cho b?n. G?i s? [phone].         [facility] complies with applicable Federal civil rights laws and does not discriminate on the basis of race, color, national origin, age, disability, or sex.        "

## 2017-03-20 ENCOUNTER — TELEPHONE (OUTPATIENT)
Dept: PAIN MEDICINE | Facility: CLINIC | Age: 37
End: 2017-03-20

## 2017-03-20 NOTE — TELEPHONE ENCOUNTER
"Staff call patient regarding her injection not working.  Patient states, "The nerve pain from her back to her left leg as worsened after only 2 days of relief after STEWART-Lumbar 3/7/17. Patient had a appointment 3/27/17 and we rescheduled her to come in sooner on 3/22/17.     Patient expressed that the muscle relaxer works at night but she cannot take it inn the day time. She would like to know what the next step would be because she has a vacation planned next week and is concerned about whether or not she can attend in her current pain.    Patient verbalized understanding and was pleased with earlier appointment.    ----- Message from Hamida Chery sent at 3/20/2017  7:28 AM CDT -----  _  1st Request  _  2nd Request  _  3rd Request        Who: patient     Why: pt had injection on 3/7/17 and it didn't work, she is going out of town next week that she might have to cancel. The pain is severe and she is not sure what the next step is. Please call     What Number to Call Back: [phone]    When to Expect a call back: (Before the end of the day)   -- if the call is after 12:00, the call back will be tomorrow.                          "

## 2017-03-22 ENCOUNTER — OFFICE VISIT (OUTPATIENT)
Dept: PAIN MEDICINE | Facility: CLINIC | Age: 37
End: 2017-03-22
Attending: ANESTHESIOLOGY
Payer: COMMERCIAL

## 2017-03-22 VITALS
SYSTOLIC BLOOD PRESSURE: 144 MMHG | HEIGHT: 62 IN | TEMPERATURE: 98 F | BODY MASS INDEX: 40.48 KG/M2 | DIASTOLIC BLOOD PRESSURE: 108 MMHG | HEART RATE: 83 BPM | WEIGHT: 220 LBS

## 2017-03-22 DIAGNOSIS — M51.36 DDD (DEGENERATIVE DISC DISEASE), LUMBAR: Primary | ICD-10-CM

## 2017-03-22 DIAGNOSIS — M54.16 LEFT LUMBAR RADICULITIS: ICD-10-CM

## 2017-03-22 DIAGNOSIS — M62.830 PARASPINAL MUSCLE SPASM: ICD-10-CM

## 2017-03-22 DIAGNOSIS — M51.16 DISPLACEMENT OF LUMBAR DISC WITH RADICULOPATHY: ICD-10-CM

## 2017-03-22 DIAGNOSIS — G89.4 CHRONIC PAIN DISORDER: ICD-10-CM

## 2017-03-22 PROCEDURE — 99214 OFFICE O/P EST MOD 30 MIN: CPT | Mod: S$GLB,,, | Performed by: ANESTHESIOLOGY

## 2017-03-22 PROCEDURE — 99999 PR PBB SHADOW E&M-EST. PATIENT-LVL III: CPT | Mod: PBBFAC,,, | Performed by: ANESTHESIOLOGY

## 2017-03-22 PROCEDURE — 1160F RVW MEDS BY RX/DR IN RCRD: CPT | Mod: S$GLB,,, | Performed by: ANESTHESIOLOGY

## 2017-03-22 NOTE — PATIENT INSTRUCTIONS
Recommend using Tylenol 1000 mg every 8 hours as needed for pain.  Do not take this with any other medications containing acetaminophen.  Do not exceed 3000 mg of acetaminophen in 24 hours.    Recommend increasing tizanidine to 2 tablets at night as needed for pain    Do not take ibuprofen, such as Advil, while taking Relafen    Recommend looking up online resource regarding psychological treatment for chronic pain:  Cognitive behavior therapy, biofeedback techniques, mediation, relaxation techniques

## 2017-03-22 NOTE — PROGRESS NOTES
Subjective:      Patient ID: Sarah Mukherjee is a 37 y.o. female.    Chief Complaint: Low-back Pain    Referred by: No ref. provider found     HPI:    Mrs. Mukherjee is a 34-year-old female who presents today with Low back pain.  Her medical history is significant for depression, anxiety, neurogenic bladder and endometriosis, status post total hysterectomy. She reports her back pain is present about 2 and half years.  She has been seeing Dr. Matt for her pain.  He has had multiple injections for her all with limited benefit.  These have included radiofrequency ablations and epidurals.  Her most recent was March 11 of this year.  This procedure actually worsened her pain. Of note, she reports multiple life stressors, including the fact that she has had a hysterectomy which means that she her  cannot have children.   Her pain is described in detail below.    Interval History (1/26/2017):  She returns today for follow up.  She reports that she was doing better until 2 months ago when she start having a new onset of left lower extremity radiating pain.  The pain originates in her low back and radiates down the posterolateral aspect of her left leg to the anterior shin.  This began after she was hunting and sitting for a prolonged period of time in a deer stand.  She had one what sounds like a Left L4 TFESI by Dr. Mike Stewart that caused her pain to be worse.  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    Interval Hisotyr (3/22/2017):  She returns to clinic today following recent L4-5 IL STEWART on 3/7/17 without continued relief of symptoms.  States that she received about 2 days worth of significant relief, but her symptoms returned sonn thereafter.  Feels like they are possibly worse than prior to STEWART.  Describes symptoms in same way - constant left-sided lumbosacral pain that radiates down the posterior aspect into her knee.  Radiating pain less frequent than the constant back  pain but can be more severe at times.  Not currently doing any stretching or strengthening exercises.  Taking Gabapentin and Tizanidine at night.      Physical Therapy: The extended course of physical therapy in 2013  This was helpful while she was going.  She did her home exercise program until a recent bladder infection.  Since that time she's not done her HEP    Non-pharmacologic Treatment: Rest and heat make her pain better.         · TENS? Not tried    Pain Medications:         · Currently taking: Gabapentin 900 mg qHS, Tizanidine 4 mg qHS, and Relafen BID.      · Has tried in the past:  Hydrocodone/acetaminophen was not helpful.  She recently tried gabapentin 100 mg daily with no benefit.      · Has not tried: TCAs, SNRIs, cream    Blood thinners: None    Interventional Therapies:   · 3/7/17 L4-5 IL STEWART  · Multiple epidural steroid injections and radiofrequency ablation.  · Left L4 TFESI: worsening pain    Relevant Surgeries: She has had a hysterectomy    Affecting sleep? Yes    Affecting daily activities? Yes    Depressive symptoms? Yes          · SI/HI? No    Work status: She is disabled    Pain Scales  Best: 7/10  Worst: 10/10  Usually: 8/10  Today: 9/10    Low-back Pain   This is a chronic problem. The current episode started more than 1 year ago. The problem occurs constantly. The problem has been gradually worsening since onset. The pain is present in the lumbar spine. The pain does not radiate. Quality: Sharp and Deep. The pain is at a severity of 9/10. The pain is moderate. The pain is the same all the time. The symptoms are aggravated by standing and bending (Walking and Getting out of bed or chair). Stiffness is present at night and all day. Associated symptoms include leg pain. She has tried injection treatment (Medication and Laying Down) for the symptoms. The treatment provided no relief.     Review of Systems   Musculoskeletal: Positive for back pain.   Psychiatric/Behavioral: Positive for  depression. The patient is nervous/anxious.    All other systems reviewed and are negative.      Past Medical History:   Diagnosis Date    Cancer     skin cancer removed    Crohn's disease     Degenerative joint disease of spine     Esophagitis     History of stomach ulcers     Lumbar facet arthropathy 6/20/2014    Neurogenic bladder        Past Surgical History:   Procedure Laterality Date    BREAST RECONSTRUCTION      HYSTERECTOMY      total hysterectomy in 20s due to fibroid, endometriosis    MASTECTOMY      PELVIC LAPAROSCOPY      TONSILLECTOMY         Review of patient's allergies indicates:   Allergen Reactions    Penicillins Hives       Current Outpatient Prescriptions   Medication Sig Dispense Refill    alprazolam (XANAX) 0.5 MG tablet Take 1 tablet (0.5 mg total) by mouth 2 (two) times daily. as needed for anxiety. 60 tablet 2    estradiol (ESTRACE) 2 MG tablet Take 1 tablet (2 mg total) by mouth once daily. 90 tablet 3    gabapentin (NEURONTIN) 300 MG capsule Increase as directed until taking 1 capsule TID 90 capsule 5    nabumetone (RELAFEN) 500 MG tablet Take 1 tablet (500 mg total) by mouth 2 (two) times daily as needed for Pain. 60 tablet 5    sertraline (ZOLOFT) 100 MG tablet Take 1 tablet (100 mg total) by mouth once daily. 90 tablet 3    sumatriptan (IMITREX) 25 MG Tab Take 1 tablet (25 mg total) by mouth every 2 (two) hours as needed. 10 tablet 5    tizanidine (ZANAFLEX) 4 MG tablet Take 1 tablet (4 mg total) by mouth nightly as needed. 30 tablet 2    topiramate (TOPAMAX) 25 MG tablet TAKE 1 TABLET BY MOUTH EVERY NIGHT AT BEDTIME FOR 7 DAYS THEN INCREASE TO 2 TABLETS BY MOUTH EVERY NIGHT AT BEDTIME 180 tablet 2    zolpidem (AMBIEN) 5 MG Tab Take 1 tablet (5 mg total) by mouth nightly as needed. 30 tablet 2     No current facility-administered medications for this visit.        Family History   Problem Relation Age of Onset    Diabetes Mother     Hypertension Mother      "Breast cancer Maternal Aunt 47    Ovarian cancer Maternal Aunt        Social History     Social History    Marital status:      Spouse name: N/A    Number of children: N/A    Years of education: N/A     Occupational History    Not on file.     Social History Main Topics    Smoking status: Never Smoker    Smokeless tobacco: Not on file    Alcohol use Yes      Comment: occ    Drug use: No    Sexual activity: Yes     Other Topics Concern    Not on file     Social History Narrative           Objective:      Vitals:    03/22/17 0850   BP: (!) 144/108   Pulse: 83   Temp: 98 °F (36.7 °C)   TempSrc: Oral   Weight: 99.8 kg (220 lb)   Height: 5' 2" (1.575 m)   PainSc:   9   PainLoc: Back       Ortho/SPM Exam    GEN:  Well developed, well nourished.  No acute distress. No pain behavior.  HEENT:  No trauma.  Mucous membranes moist.  Nares patent bilaterally.  PSYCH: Normal affect. Thought content appropriate.  CHEST:  Breathing symmetric.  No audible wheezing.  ABD: Soft, non-tender, non-distended.  SKIN:  Warm, pink, dry.  No rash on exposed areas.    EXT:  No cyanosis, clubbing, or edema.  No color change or changes in nail or hair growth.  NEURO/MUSCULOSKELETAL:  Fully alert, oriented, and appropriate. Speech normal belia. No cranial nerve deficits.   Gait: Normal.  Present trendelenburg sign bilaterally.   Motor Strength: 5/5 motor strength throughout lower extremities.   Sensory: No sensory deficit in the lower extremities.   Reflexes:  2+ and symmetric throughout.  Downgoing Babinski's bilaterally.  No clonus or spasticity.  L-Spine:  Decreased ROM with pain on Extension. Negative facet loading bilaterally.  Negative SLR left.  Negative femoral stretch on left  SI Joint/Hip: Negative CHANDNI bilaterally.  Negative FADIR bilaterally.  TTP over left>right lumbar paraspinals.  No TTP over hips, piriformis muscles, or GTB.        Imaging:      Result Narrative       MRI lumbar spine without " contrast.    Comparison: None.    Technique: Sagittal T1, T2, stir and axial T2 and T1-weighted images were obtained. The patient received 20 cc of Omniscan IV contrast .    Results: The alignment of the lumbar spine is normal. Vertebral body heights and disk spaces are relatively well maintained. No evidence of malignant bone marrow replacement process or infection. The conus medullaris terminates in good position. Very  mild disk desiccation seen at L4-5 with very minimal disk bulge and small left foraminal disk protrusion. No there is no central canal or foraminal stenosis is seen at any level. There is mild facet joint degenerative changes at L5 S1. The paraspinal  soft tissues appear normal. Following the administration of IV gadolinium contrast, no areas of abnormal enhancement seen to suggest an inflammatory, infectious or tumor process.          Result Impression       Subtle left foraminal disk protrusion at L4-L5 causing no significant mass effect on the exiting nerve root. There is no severe central canal or foraminal stenosis.         Assessment:       Encounter Diagnoses   Name Primary?    DDD (degenerative disc disease), lumbar Yes    Left lumbar radiculitis     Displacement of lumbar disc with radiculopathy     Paraspinal muscle spasm     Chronic pain disorder          Plan:       Sarah was seen today for low-back pain.    Diagnoses and all orders for this visit:    DDD (degenerative disc disease), lumbar  -     Ambulatory consult to Physical Therapy    Left lumbar radiculitis  -     Ambulatory consult to Physical Therapy    Displacement of lumbar disc with radiculopathy  -     Ambulatory consult to Physical Therapy    Paraspinal muscle spasm  -     Ambulatory consult to Physical Therapy    Chronic pain disorder  -     Ambulatory consult to Physical Therapy    From her first visit: She has a history of chronic pain following GYN surgery with irritative bladder.  In addition to this, she has a  history of multiple life stressors.  Her exam is essentially benign with each of the above diagnoses the relatively minor.  Her MRI is essentially normal.  I suspect that there may be a large underlying central component to her pain.  Because of this, I would favor starting with conservative treatment regiment.  I recommend holding off on interventional procedures for now.  I do not feel that she is a candidate for spinal cord termination at this time.  Of note, on her physical exam today her core strength was quite weak.     Assessment from 3/22/2017: Today, her central sensitization is under much better control than when I saw her in the past.  Today, she has focal physical exam findings and symptoms of a left L4 radiculitis.     I discussed the treatment options with her today, including risks, benefits, and alternatives. All available images were reviewed. The patient is aware of the risks and benefits of the medications being prescribed, common side effects, and proper usage.    1. Will schedule for a second and third L4-5 IL STEWART in hopes of increased benefit and duration, since patient did achieve some relief from previous injection.  2. Referral to PT.  3. Continue Gabapentin and Relafen.  Can increase Tizanadine to 2 pills at night prn for pain.  4. Counseled patient about the mechanism of action of Relafen, and instructed her to not take other OTC NSAIDs like Aleve or Advil in addition to Relafen.  5. Counseled patient on investigating online resources related to psychological treatment of chronic pain, including modalities such as CBT, biofeedback techniques, meditation, and relaxation.  6. I have stressed the importance of physical activity and a home exercise plan to help with pain and improve overall health.  7. RTC in 3 weeks following TSEWART.     Greater than 30 minutes spent in total in todays visit with the patient, with more than half that time direct face to face counseling and education with the  patient today. We discussed the disease process, prognosis, treatment plan, and risks and benefits.    I have seen the patient with the fellow physician.  I have performed my own history and physical exam and we have come up with the above plan.  The patient is in agreement with our plan.    The above plan and management options were discussed at length with patient. Patient is in agreement with the above and verbalized understanding. It will be communicated with the consulting physician via electronic record, fax, or mail

## 2017-03-22 NOTE — MR AVS SNAPSHOT
[facility]  [address] [facility] [address]  Phone: [phone]  Fax: [phone]                  Sarah Mukherjee   3/22/2017 9:00 AM   Office Visit    Description:  Female : 1980   Provider:  Andra Burt MD   Department:  [facility]           Reason for Visit     Low-back Pain           Diagnoses this Visit        Comments    DDD (degenerative disc disease), lumbar    -  Primary     Left lumbar radiculitis         Displacement of lumbar disc with radiculopathy         Paraspinal muscle spasm         Chronic pain disorder                To Do List           Future Appointments        Provider Department Dept Phone    3/29/2017 10:00 AM Fadi Sullivan MD [facility] [phone]    5/3/2017 10:15 AM Andra Burt MD [facility] [phone]      Your Future Surgeries/Procedures     Mar 28, 2017   Surgery with Andra Burt MD   [facility]    [address] [facility] [address] [phone]            2017   Surgery with Andra Burt MD   [facility]    [address] [facility] [address] [phone]              Goals (5 Years of Data)     None      [facility]     [facility] On [facility] Nurse Care Line - 24/ Assistance  Registered nurses in the [facility] On Call Center provide clinical advisement, health education, appointment booking, and other advisory services.  Call for this free service at [phone].             Medications           Message regarding Medications     Verify the changes and/or additions to your medication regime listed below are the same as discussed with your clinician today.  If any of these changes or additions are incorrect, please notify your healthcare provider.             Verify that the below list of medications is an accurate representation of the medications you are currently  "taking.  If none reported, the list may be blank. If incorrect, please contact your healthcare provider. Carry this list with you in case of emergency.           Current Medications     alprazolam (XANAX) 0.5 MG tablet Take 1 tablet (0.5 mg total) by mouth 2 (two) times daily. as needed for anxiety.    estradiol (ESTRACE) 2 MG tablet Take 1 tablet (2 mg total) by mouth once daily.    gabapentin (NEURONTIN) 300 MG capsule Increase as directed until taking 1 capsule TID    nabumetone (RELAFEN) 500 MG tablet Take 1 tablet (500 mg total) by mouth 2 (two) times daily as needed for Pain.    sertraline (ZOLOFT) 100 MG tablet Take 1 tablet (100 mg total) by mouth once daily.    tizanidine (ZANAFLEX) 4 MG tablet Take 1 tablet (4 mg total) by mouth nightly as needed.    topiramate (TOPAMAX) 25 MG tablet TAKE 1 TABLET BY MOUTH EVERY NIGHT AT BEDTIME FOR 7 DAYS THEN INCREASE TO 2 TABLETS BY MOUTH EVERY NIGHT AT BEDTIME    zolpidem (AMBIEN) 5 MG Tab Take 1 tablet (5 mg total) by mouth nightly as needed.    sumatriptan (IMITREX) 25 MG Tab Take 1 tablet (25 mg total) by mouth every 2 (two) hours as needed.           Clinical Reference Information           Your Vitals Were     BP Pulse Temp Height Weight BMI    144/108 83 98 °F (36.7 °C) (Oral) 5' 2" (1.575 m) 99.8 kg (220 lb) 40.24 kg/m2      Blood Pressure          Most Recent Value    BP  (!)  144/108      Allergies as of 3/22/2017     Penicillins      Immunizations Administered on Date of Encounter - 3/22/2017     None      Orders Placed During Today's Visit      Normal Orders This Visit    Ambulatory consult to Physical Therapy       Instructions    Recommend using Tylenol 1000 mg every 8 hours as needed for pain.  Do not take this with any other medications containing acetaminophen.  Do not exceed 3000 mg of acetaminophen in 24 hours.    Recommend increasing tizanidine to 2 tablets at night as needed for pain    Do not take ibuprofen, such as Advil, while taking " Relafen    Recommend looking up online resource regarding psychological treatment for chronic pain:  Cognitive behavior therapy, biofeedback techniques, mediation, relaxation techniques           Language Assistance Services     ATTENTION: Language assistance services are available, free of charge. Please call [phone].      ATENCIÓN: Si habla gil, tiene a li disposición servicios gratuitos de asistencia lingüística. Llame al [phone].     CHÚ Ý: N?u b?n nói Ti?ng Vi?t, có các d?ch v? h? tr? ngôn ng? mi?n phí dành cho b?n. G?i s? [phone].         [facility] complies with applicable Federal civil rights laws and does not discriminate on the basis of race, color, national origin, age, disability, or sex.

## 2017-03-23 ENCOUNTER — TELEPHONE (OUTPATIENT)
Dept: PAIN MEDICINE | Facility: CLINIC | Age: 37
End: 2017-03-23

## 2017-03-23 DIAGNOSIS — G47.9 SLEEP DISTURBANCE: ICD-10-CM

## 2017-03-23 DIAGNOSIS — F41.9 ANXIETY: ICD-10-CM

## 2017-03-23 DIAGNOSIS — M62.830 PARASPINAL MUSCLE SPASM: ICD-10-CM

## 2017-03-23 DIAGNOSIS — M54.16 LEFT LUMBAR RADICULITIS: ICD-10-CM

## 2017-03-23 NOTE — TELEPHONE ENCOUNTER
Spoke with patient/ states that she has to get her meds filled with [facility] in [place] on [facility]. due to insurance

## 2017-03-23 NOTE — TELEPHONE ENCOUNTER
----- Message from Alycia Paulson sent at 3/23/2017  9:05 AM CDT -----  Contact: self  Pt needs all medications transferred to [facility] on [address] in [place]. Please call [phone]. Thanks

## 2017-03-23 NOTE — TELEPHONE ENCOUNTER
----- Message from Leslie Uribe sent at 3/23/2017  9:08 AM CDT -----  x_  1st Request  _  2nd Request  _  3rd Request        Who: Sarah    Why: pt. Would like to know if dr. Burt can send her RX gabapentin (NEURONTIN) 300 MG capsule to [facility] on [address]  [facility].  Pt. Wants all her medication sent to [facility] pharmacy for now on.please call to discuss    What Number to Call Back:[phone]    When to Expect a call back: (Before the end of the day)   -- if the call is after 12:00, the call back will be tomorrow.          Patient was contacted staff left a message for patient to contact the office regarding her medication

## 2017-03-24 RX ORDER — SERTRALINE HYDROCHLORIDE 100 MG/1
100 TABLET, FILM COATED ORAL DAILY
Qty: 90 TABLET | Refills: 3 | Status: SHIPPED | OUTPATIENT
Start: 2017-03-24 | End: 2017-06-15 | Stop reason: SDUPTHER

## 2017-03-24 RX ORDER — ALPRAZOLAM 0.5 MG/1
0.5 TABLET ORAL 2 TIMES DAILY
Qty: 60 TABLET | Refills: 2 | OUTPATIENT
Start: 2017-03-24

## 2017-03-24 RX ORDER — TIZANIDINE 4 MG/1
4 TABLET ORAL NIGHTLY PRN
Qty: 30 TABLET | Refills: 2 | Status: SHIPPED | OUTPATIENT
Start: 2017-03-24 | End: 2017-04-23

## 2017-03-24 RX ORDER — ESTRADIOL 2 MG/1
2 TABLET ORAL DAILY
Qty: 90 TABLET | Refills: 3 | Status: SHIPPED | OUTPATIENT
Start: 2017-03-24 | End: 2017-06-15 | Stop reason: SDUPTHER

## 2017-03-24 RX ORDER — GABAPENTIN 300 MG/1
900 CAPSULE ORAL NIGHTLY
Qty: 270 CAPSULE | Refills: 3 | Status: SHIPPED | OUTPATIENT
Start: 2017-03-24 | End: 2017-06-15 | Stop reason: SDUPTHER

## 2017-03-24 RX ORDER — ZOLPIDEM TARTRATE 5 MG/1
5 TABLET ORAL NIGHTLY PRN
Qty: 30 TABLET | Refills: 2 | OUTPATIENT
Start: 2017-03-24

## 2017-03-24 NOTE — TELEPHONE ENCOUNTER
Patient states that [facility] is giving her problems with filling Gabapentin. Patient would like all her Rx that Dr. Burt sent to [facility] to be sent to [facility]. [facility] states that Gabapentin has to be a 3 month supply by it being a maintenance medication.Pls review and advise.    Staff informed patient that this message will be sent to Dr. Burt and upon getting a response back we will contact her to update.    Patient verbalized understanding and expressed thanks.       ----- Message from Joe Garcia sent at 3/23/2017  4:43 PM CDT -----  _  1st Request  x_  2nd Request  _  3rd Request        Who: GENARO BLANTON [[id number]]    Why: Pt returning a call. She is requesting to have all her rx transferred to the [facility] on [facility]. Please call back.    What Number to Call Back:[phone]    When to Expect a call back: (Before the end of the day)   -- if the call is after 12:00, the call back will be tomorrow.

## 2017-03-28 ENCOUNTER — HOSPITAL ENCOUNTER (OUTPATIENT)
Facility: OTHER | Age: 37
Discharge: HOME | End: 2017-03-28
Attending: ANESTHESIOLOGY | Admitting: ANESTHESIOLOGY
Payer: COMMERCIAL

## 2017-03-28 ENCOUNTER — SURGERY (OUTPATIENT)
Age: 37
End: 2017-03-28

## 2017-03-28 VITALS
DIASTOLIC BLOOD PRESSURE: 83 MMHG | HEART RATE: 69 BPM | RESPIRATION RATE: 18 BRPM | HEIGHT: 62 IN | WEIGHT: 220 LBS | OXYGEN SATURATION: 98 % | SYSTOLIC BLOOD PRESSURE: 138 MMHG | TEMPERATURE: 98 F | BODY MASS INDEX: 40.48 KG/M2

## 2017-03-28 DIAGNOSIS — M51.36 DDD (DEGENERATIVE DISC DISEASE), LUMBAR: Primary | ICD-10-CM

## 2017-03-28 PROCEDURE — 25500020 PHARM REV CODE 255: Performed by: ANESTHESIOLOGY

## 2017-03-28 PROCEDURE — 63600175 PHARM REV CODE 636 W HCPCS: Performed by: ANESTHESIOLOGY

## 2017-03-28 PROCEDURE — 62323 NJX INTERLAMINAR LMBR/SAC: CPT | Mod: ,,, | Performed by: ANESTHESIOLOGY

## 2017-03-28 PROCEDURE — 62322 NJX INTERLAMINAR LMBR/SAC: CPT | Performed by: ANESTHESIOLOGY

## 2017-03-28 PROCEDURE — 99152 MOD SED SAME PHYS/QHP 5/>YRS: CPT | Mod: ,,, | Performed by: ANESTHESIOLOGY

## 2017-03-28 PROCEDURE — 62323 NJX INTERLAMINAR LMBR/SAC: CPT | Performed by: ANESTHESIOLOGY

## 2017-03-28 PROCEDURE — 77003 FLUOROGUIDE FOR SPINE INJECT: CPT | Performed by: ANESTHESIOLOGY

## 2017-03-28 PROCEDURE — 25000003 PHARM REV CODE 250: Performed by: ANESTHESIOLOGY

## 2017-03-28 RX ORDER — MIDAZOLAM HYDROCHLORIDE 1 MG/ML
2 INJECTION INTRAMUSCULAR; INTRAVENOUS CONTINUOUS PRN
Status: DISCONTINUED | OUTPATIENT
Start: 2017-03-28 | End: 2017-03-28 | Stop reason: HOSPADM

## 2017-03-28 RX ORDER — SODIUM CHLORIDE 9 MG/ML
INJECTION, SOLUTION INTRAMUSCULAR; INTRAVENOUS; SUBCUTANEOUS
Status: DISCONTINUED | OUTPATIENT
Start: 2017-03-28 | End: 2017-03-28 | Stop reason: HOSPADM

## 2017-03-28 RX ORDER — FENTANYL CITRATE 50 UG/ML
INJECTION, SOLUTION INTRAMUSCULAR; INTRAVENOUS
Status: DISCONTINUED | OUTPATIENT
Start: 2017-03-28 | End: 2017-03-28 | Stop reason: HOSPADM

## 2017-03-28 RX ORDER — METHYLPREDNISOLONE ACETATE 80 MG/ML
80 INJECTION, SUSPENSION INTRA-ARTICULAR; INTRALESIONAL; INTRAMUSCULAR; SOFT TISSUE ONCE
Status: COMPLETED | OUTPATIENT
Start: 2017-03-29 | End: 2017-03-28

## 2017-03-28 RX ORDER — SODIUM CHLORIDE 9 MG/ML
INJECTION, SOLUTION INTRAVENOUS CONTINUOUS
Status: DISCONTINUED | OUTPATIENT
Start: 2017-03-29 | End: 2017-03-28 | Stop reason: HOSPADM

## 2017-03-28 RX ORDER — BUPIVACAINE HYDROCHLORIDE 2.5 MG/ML
10 INJECTION, SOLUTION EPIDURAL; INFILTRATION; INTRACAUDAL ONCE
Status: COMPLETED | OUTPATIENT
Start: 2017-03-29 | End: 2017-03-28

## 2017-03-28 RX ORDER — MIDAZOLAM HYDROCHLORIDE 1 MG/ML
INJECTION INTRAMUSCULAR; INTRAVENOUS
Status: DISCONTINUED | OUTPATIENT
Start: 2017-03-28 | End: 2017-03-28 | Stop reason: HOSPADM

## 2017-03-28 RX ORDER — FENTANYL CITRATE 50 UG/ML
50 INJECTION, SOLUTION INTRAMUSCULAR; INTRAVENOUS ONCE
Status: DISCONTINUED | OUTPATIENT
Start: 2017-03-28 | End: 2017-03-28 | Stop reason: HOSPADM

## 2017-03-28 RX ORDER — LIDOCAINE HYDROCHLORIDE 10 MG/ML
10 INJECTION INFILTRATION; PERINEURAL ONCE
Status: COMPLETED | OUTPATIENT
Start: 2017-03-29 | End: 2017-03-28

## 2017-03-28 RX ADMIN — SODIUM CHLORIDE 3 ML: 9 INJECTION, SOLUTION INTRAMUSCULAR; INTRAVENOUS; SUBCUTANEOUS at 09:03

## 2017-03-28 RX ADMIN — MIDAZOLAM HYDROCHLORIDE 1 MG: 1 INJECTION, SOLUTION INTRAMUSCULAR; INTRAVENOUS at 09:03

## 2017-03-28 RX ADMIN — LIDOCAINE HYDROCHLORIDE 10 ML: 10 INJECTION, SOLUTION INFILTRATION; PERINEURAL at 09:03

## 2017-03-28 RX ADMIN — SODIUM BICARBONATE 4 MEQ: 0.2 INJECTION, SOLUTION INTRAVENOUS at 09:03

## 2017-03-28 RX ADMIN — SODIUM CHLORIDE: 0.9 INJECTION, SOLUTION INTRAVENOUS at 08:03

## 2017-03-28 RX ADMIN — METHYLPREDNISOLONE ACETATE 80 MG: 80 INJECTION, SUSPENSION INTRA-ARTICULAR; INTRALESIONAL; INTRAMUSCULAR; SOFT TISSUE at 09:03

## 2017-03-28 RX ADMIN — BUPIVACAINE HYDROCHLORIDE 10 ML: 2.5 INJECTION, SOLUTION EPIDURAL; INFILTRATION; INTRACAUDAL; PERINEURAL at 09:03

## 2017-03-28 RX ADMIN — IOHEXOL 5 ML: 300 INJECTION, SOLUTION INTRAVENOUS at 09:03

## 2017-03-28 RX ADMIN — FENTANYL CITRATE 50 MCG: 50 INJECTION, SOLUTION INTRAMUSCULAR; INTRAVENOUS at 09:03

## 2017-03-28 NOTE — H&P (VIEW-ONLY)
Subjective:      Patient ID: Sarah Mukherjee is a 37 y.o. female.    Chief Complaint: Low-back Pain    Referred by: No ref. provider found     HPI:    Mrs. Mukherjee is a 34-year-old female who presents today with Low back pain.  Her medical history is significant for depression, anxiety, neurogenic bladder and endometriosis, status post total hysterectomy. She reports her back pain is present about 2 and half years.  She has been seeing Dr. Matt for her pain.  He has had multiple injections for her all with limited benefit.  These have included radiofrequency ablations and epidurals.  Her most recent was March 11 of this year.  This procedure actually worsened her pain. Of note, she reports multiple life stressors, including the fact that she has had a hysterectomy which means that she her  cannot have children.   Her pain is described in detail below.    Interval History (1/26/2017):  She returns today for follow up.  She reports that she was doing better until 2 months ago when she start having a new onset of left lower extremity radiating pain.  The pain originates in her low back and radiates down the posterolateral aspect of her left leg to the anterior shin.  This began after she was hunting and sitting for a prolonged period of time in a deer stand.  She had one what sounds like a Left L4 TFESI by Dr. Mike Stewart that caused her pain to be worse.  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    Interval Hisotyr (3/22/2017):  She returns to clinic today following recent L4-5 IL STEWART on 3/7/17 without continued relief of symptoms.  States that she received about 2 days worth of significant relief, but her symptoms returned sonn thereafter.  Feels like they are possibly worse than prior to STEWART.  Describes symptoms in same way - constant left-sided lumbosacral pain that radiates down the posterior aspect into her knee.  Radiating pain less frequent than the constant back  pain but can be more severe at times.  Not currently doing any stretching or strengthening exercises.  Taking Gabapentin and Tizanidine at night.      Physical Therapy: The extended course of physical therapy in 2013  This was helpful while she was going.  She did her home exercise program until a recent bladder infection.  Since that time she's not done her HEP    Non-pharmacologic Treatment: Rest and heat make her pain better.         · TENS? Not tried    Pain Medications:         · Currently taking: Gabapentin 900 mg qHS, Tizanidine 4 mg qHS, and Relafen BID.      · Has tried in the past:  Hydrocodone/acetaminophen was not helpful.  She recently tried gabapentin 100 mg daily with no benefit.      · Has not tried: TCAs, SNRIs, cream    Blood thinners: None    Interventional Therapies:   · 3/7/17 L4-5 IL STEWART  · Multiple epidural steroid injections and radiofrequency ablation.  · Left L4 TFESI: worsening pain    Relevant Surgeries: She has had a hysterectomy    Affecting sleep? Yes    Affecting daily activities? Yes    Depressive symptoms? Yes          · SI/HI? No    Work status: She is disabled    Pain Scales  Best: 7/10  Worst: 10/10  Usually: 8/10  Today: 9/10    Low-back Pain   This is a chronic problem. The current episode started more than 1 year ago. The problem occurs constantly. The problem has been gradually worsening since onset. The pain is present in the lumbar spine. The pain does not radiate. Quality: Sharp and Deep. The pain is at a severity of 9/10. The pain is moderate. The pain is the same all the time. The symptoms are aggravated by standing and bending (Walking and Getting out of bed or chair). Stiffness is present at night and all day. Associated symptoms include leg pain. She has tried injection treatment (Medication and Laying Down) for the symptoms. The treatment provided no relief.     Review of Systems   Musculoskeletal: Positive for back pain.   Psychiatric/Behavioral: Positive for  depression. The patient is nervous/anxious.    All other systems reviewed and are negative.      Past Medical History:   Diagnosis Date    Cancer     skin cancer removed    Crohn's disease     Degenerative joint disease of spine     Esophagitis     History of stomach ulcers     Lumbar facet arthropathy 6/20/2014    Neurogenic bladder        Past Surgical History:   Procedure Laterality Date    BREAST RECONSTRUCTION      HYSTERECTOMY      total hysterectomy in 20s due to fibroid, endometriosis    MASTECTOMY      PELVIC LAPAROSCOPY      TONSILLECTOMY         Review of patient's allergies indicates:   Allergen Reactions    Penicillins Hives       Current Outpatient Prescriptions   Medication Sig Dispense Refill    alprazolam (XANAX) 0.5 MG tablet Take 1 tablet (0.5 mg total) by mouth 2 (two) times daily. as needed for anxiety. 60 tablet 2    estradiol (ESTRACE) 2 MG tablet Take 1 tablet (2 mg total) by mouth once daily. 90 tablet 3    gabapentin (NEURONTIN) 300 MG capsule Increase as directed until taking 1 capsule TID 90 capsule 5    nabumetone (RELAFEN) 500 MG tablet Take 1 tablet (500 mg total) by mouth 2 (two) times daily as needed for Pain. 60 tablet 5    sertraline (ZOLOFT) 100 MG tablet Take 1 tablet (100 mg total) by mouth once daily. 90 tablet 3    sumatriptan (IMITREX) 25 MG Tab Take 1 tablet (25 mg total) by mouth every 2 (two) hours as needed. 10 tablet 5    tizanidine (ZANAFLEX) 4 MG tablet Take 1 tablet (4 mg total) by mouth nightly as needed. 30 tablet 2    topiramate (TOPAMAX) 25 MG tablet TAKE 1 TABLET BY MOUTH EVERY NIGHT AT BEDTIME FOR 7 DAYS THEN INCREASE TO 2 TABLETS BY MOUTH EVERY NIGHT AT BEDTIME 180 tablet 2    zolpidem (AMBIEN) 5 MG Tab Take 1 tablet (5 mg total) by mouth nightly as needed. 30 tablet 2     No current facility-administered medications for this visit.        Family History   Problem Relation Age of Onset    Diabetes Mother     Hypertension Mother      "Breast cancer Maternal Aunt 47    Ovarian cancer Maternal Aunt        Social History     Social History    Marital status:      Spouse name: N/A    Number of children: N/A    Years of education: N/A     Occupational History    Not on file.     Social History Main Topics    Smoking status: Never Smoker    Smokeless tobacco: Not on file    Alcohol use Yes      Comment: occ    Drug use: No    Sexual activity: Yes     Other Topics Concern    Not on file     Social History Narrative           Objective:      Vitals:    03/22/17 0850   BP: (!) 144/108   Pulse: 83   Temp: 98 °F (36.7 °C)   TempSrc: Oral   Weight: 99.8 kg (220 lb)   Height: 5' 2" (1.575 m)   PainSc:   9   PainLoc: Back       Ortho/SPM Exam    GEN:  Well developed, well nourished.  No acute distress. No pain behavior.  HEENT:  No trauma.  Mucous membranes moist.  Nares patent bilaterally.  PSYCH: Normal affect. Thought content appropriate.  CHEST:  Breathing symmetric.  No audible wheezing.  ABD: Soft, non-tender, non-distended.  SKIN:  Warm, pink, dry.  No rash on exposed areas.    EXT:  No cyanosis, clubbing, or edema.  No color change or changes in nail or hair growth.  NEURO/MUSCULOSKELETAL:  Fully alert, oriented, and appropriate. Speech normal belia. No cranial nerve deficits.   Gait: Normal.  Present trendelenburg sign bilaterally.   Motor Strength: 5/5 motor strength throughout lower extremities.   Sensory: No sensory deficit in the lower extremities.   Reflexes:  2+ and symmetric throughout.  Downgoing Babinski's bilaterally.  No clonus or spasticity.  L-Spine:  Decreased ROM with pain on Extension. Negative facet loading bilaterally.  Negative SLR left.  Negative femoral stretch on left  SI Joint/Hip: Negative CHANDNI bilaterally.  Negative FADIR bilaterally.  TTP over left>right lumbar paraspinals.  No TTP over hips, piriformis muscles, or GTB.        Imaging:      Result Narrative       MRI lumbar spine without " contrast.    Comparison: None.    Technique: Sagittal T1, T2, stir and axial T2 and T1-weighted images were obtained. The patient received 20 cc of Omniscan IV contrast .    Results: The alignment of the lumbar spine is normal. Vertebral body heights and disk spaces are relatively well maintained. No evidence of malignant bone marrow replacement process or infection. The conus medullaris terminates in good position. Very  mild disk desiccation seen at L4-5 with very minimal disk bulge and small left foraminal disk protrusion. No there is no central canal or foraminal stenosis is seen at any level. There is mild facet joint degenerative changes at L5 S1. The paraspinal  soft tissues appear normal. Following the administration of IV gadolinium contrast, no areas of abnormal enhancement seen to suggest an inflammatory, infectious or tumor process.          Result Impression       Subtle left foraminal disk protrusion at L4-L5 causing no significant mass effect on the exiting nerve root. There is no severe central canal or foraminal stenosis.         Assessment:       Encounter Diagnoses   Name Primary?    DDD (degenerative disc disease), lumbar Yes    Left lumbar radiculitis     Displacement of lumbar disc with radiculopathy     Paraspinal muscle spasm     Chronic pain disorder          Plan:       Sarah was seen today for low-back pain.    Diagnoses and all orders for this visit:    DDD (degenerative disc disease), lumbar  -     Ambulatory consult to Physical Therapy    Left lumbar radiculitis  -     Ambulatory consult to Physical Therapy    Displacement of lumbar disc with radiculopathy  -     Ambulatory consult to Physical Therapy    Paraspinal muscle spasm  -     Ambulatory consult to Physical Therapy    Chronic pain disorder  -     Ambulatory consult to Physical Therapy    From her first visit: She has a history of chronic pain following GYN surgery with irritative bladder.  In addition to this, she has a  history of multiple life stressors.  Her exam is essentially benign with each of the above diagnoses the relatively minor.  Her MRI is essentially normal.  I suspect that there may be a large underlying central component to her pain.  Because of this, I would favor starting with conservative treatment regiment.  I recommend holding off on interventional procedures for now.  I do not feel that she is a candidate for spinal cord termination at this time.  Of note, on her physical exam today her core strength was quite weak.     Assessment from 3/22/2017: Today, her central sensitization is under much better control than when I saw her in the past.  Today, she has focal physical exam findings and symptoms of a left L4 radiculitis.     I discussed the treatment options with her today, including risks, benefits, and alternatives. All available images were reviewed. The patient is aware of the risks and benefits of the medications being prescribed, common side effects, and proper usage.    1. Will schedule for a second and third L4-5 IL STEWART in hopes of increased benefit and duration, since patient did achieve some relief from previous injection.  2. Referral to PT.  3. Continue Gabapentin and Relafen.  Can increase Tizanadine to 2 pills at night prn for pain.  4. Counseled patient about the mechanism of action of Relafen, and instructed her to not take other OTC NSAIDs like Aleve or Advil in addition to Relafen.  5. Counseled patient on investigating online resources related to psychological treatment of chronic pain, including modalities such as CBT, biofeedback techniques, meditation, and relaxation.  6. I have stressed the importance of physical activity and a home exercise plan to help with pain and improve overall health.  7. RTC in 3 weeks following STEWART.     Greater than 30 minutes spent in total in todays visit with the patient, with more than half that time direct face to face counseling and education with the  patient today. We discussed the disease process, prognosis, treatment plan, and risks and benefits.    I have seen the patient with the fellow physician.  I have performed my own history and physical exam and we have come up with the above plan.  The patient is in agreement with our plan.    The above plan and management options were discussed at length with patient. Patient is in agreement with the above and verbalized understanding. It will be communicated with the consulting physician via electronic record, fax, or mail

## 2017-03-28 NOTE — IP AVS SNAPSHOT
[facility] Location ([facility])  [address] [place], [place] [address]  Phone: [phone]           Patient Discharge Instructions   Our goal is to set you up for success. This packet includes information on your condition, medications, and your home care.  It will help you care for yourself to prevent having to return to the hospital.     Please ask your nurse if you have any questions.      There are many details to remember when preparing to leave the hospital. Here is what you will need to do:    1. Take your medicine. If you are prescribed medications, review your Medication List on the following pages. You may have new medications to  at the pharmacy and others that you'll need to stop taking. Review the instructions for how and when to take your medications. Talk with your doctor or nurses if you are unsure of what to do.     2. Go to your follow-up appointments. Specific follow-up information is listed in the following pages. Your may be contacted by a nurse or clinical provider about future appointments. Be sure we have all of the phone numbers to reach you. Please contact your provider's office if you are unable to make an appointment.     3. Watch for warning signs. Your doctor or nurse will give you detailed warning signs to watch for and when to call for assistance. These instructions may also include educational information about your condition. If you experience any of warning signs to your health, call your doctor.               [facility]  Unless otherwise directed by your provider, please contact [facility], our nurse care line that is available for 24/7 assistance.     [phone] (toll-free)    Registered nurses in the [facility] provide clinical advisement, health education, appointment booking, and other advisory services.                    ** Verify the list of medication(s) below is accurate and up to date. Carry this with you in case of emergency.  If your medications have changed, please notify your healthcare provider.             Medication List      ASK your doctor about these medications        Additional Info                      alprazolam 0.5 MG tablet   Commonly known as:  XANAX   Quantity:  60 tablet   Refills:  2   Dose:  0.5 mg    Instructions:  Take 1 tablet (0.5 mg total) by mouth 2 (two) times daily. as needed for anxiety.     Begin Date    AM    Noon    PM    Bedtime       estradiol 2 MG tablet   Commonly known as:  ESTRACE   Quantity:  90 tablet   Refills:  3   Dose:  2 mg    Instructions:  Take 1 tablet (2 mg total) by mouth once daily.     Begin Date    AM    Noon    PM    Bedtime       gabapentin 300 MG capsule   Commonly known as:  NEURONTIN   Quantity:  270 capsule   Refills:  3   Dose:  900 mg    Instructions:  Take 3 capsules (900 mg total) by mouth every evening.     Begin Date    AM    Noon    PM    Bedtime       nabumetone 500 MG tablet   Commonly known as:  RELAFEN   Quantity:  60 tablet   Refills:  5   Dose:  500 mg    Instructions:  Take 1 tablet (500 mg total) by mouth 2 (two) times daily as needed for Pain.     Begin Date    AM    Noon    PM    Bedtime       sertraline 100 MG tablet   Commonly known as:  ZOLOFT   Quantity:  90 tablet   Refills:  3   Dose:  100 mg   Comments:  **Patient requests 90 days supply**    Instructions:  Take 1 tablet (100 mg total) by mouth once daily.     Begin Date    AM    Noon    PM    Bedtime       sumatriptan 25 MG Tab   Commonly known as:  IMITREX   Quantity:  10 tablet   Refills:  5   Dose:  25 mg    Instructions:  Take 1 tablet (25 mg total) by mouth every 2 (two) hours as needed.     Begin Date    AM    Noon    PM    Bedtime       tizanidine 4 MG tablet   Commonly known as:  ZANAFLEX   Quantity:  30 tablet   Refills:  2   Dose:  4 mg    Instructions:  Take 1 tablet (4 mg total) by mouth nightly as needed.     Begin Date    AM    Noon    PM    Bedtime       topiramate 25 MG tablet   Commonly  known as:  TOPAMAX   Quantity:  180 tablet   Refills:  2   Comments:  **Patient requests 90 days supply**    Instructions:  TAKE 1 TABLET BY MOUTH EVERY NIGHT AT BEDTIME FOR 7 DAYS THEN INCREASE TO 2 TABLETS BY MOUTH EVERY NIGHT AT BEDTIME     Begin Date    AM    Noon    PM    Bedtime       zolpidem 5 MG Tab   Commonly known as:  AMBIEN   Quantity:  30 tablet   Refills:  2   Dose:  5 mg    Instructions:  Take 1 tablet (5 mg total) by mouth nightly as needed.     Begin Date    AM    Noon    PM    Bedtime                  Please bring to all follow up appointments:    1. A copy of your discharge instructions.  2. All medicines you are currently taking in their original bottles.  3. Identification and insurance card.    Please arrive 15 minutes ahead of scheduled appointment time.    Please call 24 hours in advance if you must reschedule your appointment and/or time.        Your Scheduled Appointments     Mar 29, 2017 10:00 AM CDT   Neurosurgery Consult with MD [facility] ([facility] )    [address]  [place] [address]   [phone]            May 03, 2017 10:15 AM CDT   Established Patient Visit with Andra Burt MD   [facility]    [address]  [place] [address] [phone]              Your Future Surgeries/Procedures     Apr 11, 2017   Surgery with Andra Burt MD   [facility] ([facility])    [address]  [place] [address] [phone]              Follow-up Information     Call Andra Burt MD.    Specialty:  Pain Medicine    Why:  As needed, If symptoms worsen    Contact information:    [address]  [address]  [place] [id number] [phone]            Discharge Instructions       Home Care Instructions Pain Management:    1. DIET:   You may resume your normal diet today.   2. BATHING:   You may shower with luke warm water.  3. DRESSING:   You may remove  "your bandage today.   4. ACTIVITY LEVEL:   You may resume your normal activities 24 hrs after your procedure.  5. MEDICATIONS:   You may resume your normal medications today.   6. SPECIAL INSTRUCTIONS:   No heat to the injection site for 24 hrs including, bath or shower, heating pad, moist heat, or hot tubs.    Use ice pack to injection site for any pain or discomfort.  Apply ice packs for 20 minute intervals as needed.   If you have received any sedatives by mouth today you may not drive for 12 hours.    If you have received any sedation through your IV, you may not drive for 24 hrs.     PLEASE CALL YOUR DOCTOR IF:  1. Redness or swelling around the injection site.  2. Fever of 101 degrees  3. Drainage (pus) from the injection site.  4. For any continuous bleeding (some dried blood over the incision is normal.)    FOR EMERGENCIES:   If any unusual problems or difficulties occur during clinic hours, call [phone] or [phone].         Admission Information     Date & Time Provider Department [facility]    3/28/2017  8:03 AM Andra Burt MD [facility] [phone]      Care Providers     Provider Role Specialty Primary office phone    Andra Burt MD Attending Provider Pain Medicine [phone]    Andra Burt MD Surgeon  Pain Medicine [phone]      Your Vitals Were     BP Pulse Temp Resp Height Weight    114/65 (BP Location: Right arm, Patient Position: Lying, BP Method: Automatic) 67 98.1 °F (36.7 °C) (Oral) 18 5' 2" (1.575 m) 99.8 kg (220 lb)    SpO2 BMI             95% 40.24 kg/m2         Recent Lab Values     No lab values to display.      Allergies as of 3/28/2017        Reactions    Penicillins Hives      Advance Directives     An advance directive is a document which, in the event you are no longer able to make decisions for yourself, tells your healthcare team what kind of treatment you do or do not want to receive, or who you would like to make those decisions for you.  If you do " not currently have an advance directive, [facility] encourages you to create one.  For more information call:  [phone]), [phone] ([phone]),  or log on to [email or web address].        Language Assistance Services     ATTENTION: Language assistance services are available, free of charge. Please call [phone].      ATENCIÓN: Si habla español, tiene a li disposición servicios gratuitos de asistencia lingüística. Llame al [phone].     CHÚ Ý: N?u b?n nói Ti?ng Vi?t, có các d?ch v? h? tr? ngôn ng? mi?n phí dành cho b?n. G?i s? [phone].         [facility] complies with applicable Federal civil rights laws and does not discriminate on the basis of race, color, national origin, age, disability, or sex.

## 2017-03-28 NOTE — DISCHARGE INSTRUCTIONS

## 2017-03-28 NOTE — DISCHARGE SUMMARY
Discharge Note  Short Stay      SUMMARY     Admit Date: 3/28/2017    Attending Physician: Andra Burt      Discharge Physician: Andra Burt      Discharge Date: 3/28/2017 9:07 AM    Final Diagnosis: lumbar facet arthritis    Disposition: Home or self care    Patient Instructions:   Discharge Medication List as of 3/28/2017  9:57 AM      CONTINUE these medications which have NOT CHANGED    Details   alprazolam (XANAX) 0.5 MG tablet Take 1 tablet (0.5 mg total) by mouth 2 (two) times daily. as needed for anxiety., Starting 3/13/2017, Until Discontinued, Print      estradiol (ESTRACE) 2 MG tablet Take 1 tablet (2 mg total) by mouth once daily., Starting 3/24/2017, Until Discontinued, Normal      gabapentin (NEURONTIN) 300 MG capsule Take 3 capsules (900 mg total) by mouth every evening., Starting 3/24/2017, Until Mon 3/19/18, Normal      nabumetone (RELAFEN) 500 MG tablet Take 1 tablet (500 mg total) by mouth 2 (two) times daily as needed for Pain., Starting 1/26/2017, Until Tue 7/25/17, Normal      sertraline (ZOLOFT) 100 MG tablet Take 1 tablet (100 mg total) by mouth once daily., Starting 3/24/2017, Until Discontinued, Normal      sumatriptan (IMITREX) 25 MG Tab Take 1 tablet (25 mg total) by mouth every 2 (two) hours as needed., Starting 7/28/2016, Until Mon 3/13/17, Normal      tizanidine (ZANAFLEX) 4 MG tablet Take 1 tablet (4 mg total) by mouth nightly as needed., Starting 3/24/2017, Until Sun 4/23/17, Normal      topiramate (TOPAMAX) 25 MG tablet TAKE 1 TABLET BY MOUTH EVERY NIGHT AT BEDTIME FOR 7 DAYS THEN INCREASE TO 2 TABLETS BY MOUTH EVERY NIGHT AT BEDTIME, Normal      zolpidem (AMBIEN) 5 MG Tab Take 1 tablet (5 mg total) by mouth nightly as needed., Starting 3/13/2017, Until Discontinued, Print             Resume home diet and activity

## 2017-03-28 NOTE — OP NOTE
Date of Procedure: 03/28/2017    Procedure: L4/5 Interlaminar Epidural Steroid Injection    Referring Provider: None    Pre-op diagnosis: Lumbar DDD with radiculitis    Post-op diagnosis: Same     Physician: Dr. Andra Burt     Assistant: Dr. Horton    Anesthestia: local/IV sedation: Versed 1 mg and fentanyl 50 mcg IV.  Conscious sedation given by MD and monitored by RN.  Total sedation time was less than 30 min.    EBL: None    Specimens: None    All medications, allergies, and relevant histories were reviewed. No recent antibiotics or infections.  A time-out was taken to verify the correct patient, procedure, laterality, and appropriate medications/allergies.    Fluoroscopically-Guided, Contrast-Controlled Lumbar Interlaminar Epidural Steroid Injection:     Following denial of allergy and review of potential side effects and complications including but not necessarily limited to infection, allergic reaction, local tissue breakdown, nerve injury, paresis, paralysis, spinal cord injury, and seizure, the patient indicated they understood and agreed to proceed.     Patient was placed in prone position. Lumbar area was prepped and draped in sterile manner. Local Xylocaine 1% was given subcutaneously at the level L4/5. An 18-gauge Tuohy needle was introduced atraumatically in the interspinous space using a left paramedian approach and advanced up to the epidural space using fluoroscopic guidance in the AP position. Loss of resistance to air was used to identify the epidural space using fluoroscopic guidance in the lateral position. Following negative aspiration for blood and CSF and confirming the absence of paresthesias, injection of approximately 1 cc of Omnipaque 300 demonstrated excellent epidural spread without vascular or intrathecal uptake. At this point, 2cc of 0.25% marcaine with 80 mg of Depo-Medrol was injected without complication. The needle was flushed with 1% lidocaine and withdrawn.     Patient  tolerated the procedure well without any side effects. No noted complications.     The patient was followed post procedure and discharged under their own power in excellent condition in the company of a responsible adult.     Future Management:   If helpful, can repeat as needed.    Follow up with my clinic in 3 weeks or sooner if needed    I certify that I provided the above services.  I was present for the entire procedure, which was performed by myself with the assistance of the resident physician.  There were no parts of the procedure that were performed not by myself or without my direct supervision.

## 2017-03-29 ENCOUNTER — OFFICE VISIT (OUTPATIENT)
Dept: NEUROSURGERY | Facility: CLINIC | Age: 37
End: 2017-03-29
Payer: COMMERCIAL

## 2017-03-29 VITALS
DIASTOLIC BLOOD PRESSURE: 88 MMHG | WEIGHT: 220 LBS | TEMPERATURE: 99 F | HEART RATE: 80 BPM | HEIGHT: 62 IN | SYSTOLIC BLOOD PRESSURE: 146 MMHG | BODY MASS INDEX: 40.48 KG/M2

## 2017-03-29 DIAGNOSIS — M51.36 DDD (DEGENERATIVE DISC DISEASE), LUMBAR: ICD-10-CM

## 2017-03-29 DIAGNOSIS — M47.816 LUMBAR FACET ARTHROPATHY: Primary | ICD-10-CM

## 2017-03-29 PROCEDURE — 99244 OFF/OP CNSLTJ NEW/EST MOD 40: CPT | Mod: S$GLB,,, | Performed by: NEUROLOGICAL SURGERY

## 2017-03-29 PROCEDURE — 99999 PR PBB SHADOW E&M-EST. PATIENT-LVL III: CPT | Mod: PBBFAC,,, | Performed by: NEUROLOGICAL SURGERY

## 2017-03-29 RX ORDER — PANTOPRAZOLE SODIUM 40 MG/1
40 TABLET, DELAYED RELEASE ORAL DAILY
Refills: 5 | COMMUNITY
Start: 2017-03-23 | End: 2018-03-16 | Stop reason: SDUPTHER

## 2017-03-29 NOTE — LETTER
March 29, 2017      Andra Burt MD  [address]  [address]  [facility] [address]           [facility] - Neurosurgery 7th Fl  [address]  [place] [address]  Phone: [phone]          Patient: Sarah Mukherjee   MR Number: [id number]   YOB: 1980   Date of Visit: 3/29/2017       Dear Dr. Andra Burt:    Thank you for referring Sarah Mukherjee to me for evaluation. Attached you will find relevant portions of my assessment and plan of care.    If you have questions, please do not hesitate to call me. I look forward to following Sarah Mukherjee along with you.    Sincerely,    Fadi Sullivan MD    Enclosure  CC:  No Recipients    If you would like to receive this communication electronically, please contact [email or web address] or [phone] to request more information on [facility] Link access.    For providers and/or their staff who would like to refer a patient to [facility], please contact us through our one-stop-shop provider referral line, [facility], at [phone].    If you feel you have received this communication in error or would no longer like to receive these types of communications, please e-mail [email or web address]

## 2017-03-29 NOTE — PROGRESS NOTES
Subjective:    I, Rigoberto Saldaña, am scribing for, and in the presence of, Dr. Sullivan.     Patient ID: Sarah Mukherjee is a 37 y.o. female.    Chief Complaint: Lumbar Spine Pain (L-Spine)    HPI   Pt is a 37 y.o. female who presents per referral by .Dr. Burt  for evaluation of lower back issues. Pt states having chronic back pain for the past several years, but more recently started have constant radiating pain down her left hip, thigh, and buttocks. She reports history of 3 rounds of STEWART without improvement. Last injection was done in January 2017. She has not tried any physical therapy. She denies any right-sided pain.  She has history of anxiety and depression but also a urogenic bladder.     Review of Systems   Constitutional: Negative for chills, fatigue and fever.   HENT: Negative for sinus pressure and trouble swallowing.    Eyes: Negative.  Negative for visual disturbance.   Respiratory: Negative.    Cardiovascular: Negative.    Gastrointestinal: Negative.  Negative for nausea and vomiting.   Endocrine: Negative.    Genitourinary: Negative.    Musculoskeletal: Positive for back pain (lower back pain, radiates to LLE).        Positive for shooting LLE pain, extending to left thigh, and hip, and buttocks.    Neurological: Negative for dizziness, seizures, syncope, speech difficulty, weakness and numbness.       Objective:      Physical Exam:    Constitutional: She appears well-developed.     Eyes: Pupils are equal, round, and reactive to light. Conjunctivae and EOM are normal.     Cardiovascular: Normal rate, regular rhythm, normal pulses and intact distal pulses.     Abdominal: Soft.     Psych/Behavior: She is alert. She is oriented to person, place, and time. She has a normal mood and affect.     Musculoskeletal: Gait is normal.        Neck: Range of motion is full. There is no tenderness. Muscle strength is 5/5. Tone is normal.        Back: Range of motion is full. There is no tenderness. Muscle strength is 5/5.  Tone is normal.        Right Upper Extremities: Range of motion is full. There is no tenderness. Muscle strength is 5/5. Tone is normal.        Left Upper Extremities: Range of motion is full. There is no tenderness. Muscle strength is 5/5. Tone is normal.       Right Lower Extremities: Range of motion is full. There is no tenderness. Muscle strength is 5/5. Tone is normal.        Left Lower Extremities: Range of motion is full. There is no tenderness. Muscle strength is 5/5. Tone is normal.     Neurological:        Coordination: She has a normal Romberg Test, normal finger to nose coordination, normal heel to shin coordination and normal tandem walking coordination.        Sensory: There is no sensory deficit in the trunk. There is no sensory deficit in the extremities.        DTRs: DTRs are normal. Tricep reflexes are 2+ on the right side and 2+ on the left side. Bicep reflexes are 2+ on the right side and 2+ on the left side. Brachioradialis reflexes are 2+ on the right side and 2+ on the left side. Patellar reflexes are 2+ on the right side and 2+ on the left side. Achilles reflexes are 2+ on the right side and 2+ on the left side. She displays no Babinski's sign on the right side. She displays no Babinski's sign on the left side.        Cranial nerves: Cranial nerve(s) II, III, IV, V, VI, VII, VIII, IX, X, XI and XII are intact.       Pt has a fair amount of back and left hip left thigh.   It appears to fit more of a L4 distribution.   She does not have a positive SLR.   She is full strength.   She has normal sensation.     Imaging:   MRI L-spine, dated December 2016, done at outside facility, shows relatively mild DDD and disc bulge at L4-5, L5-S1.      Assessment/Plan:   Pt with history of back pain and relatively mild disc bulges at L4-5 and L5-S1. I don't really see significant disc herniation. I don't really see significant nerve root compression. I certainly don't see anything that would be operative at  this stage. She is scheduled to have another round of injections next week. I personally would like try a left-sided L4-5 selective nerve root block and give her some more time with physical therapy. We will plan to see the pt back in 3 months and if things aren't any better at that point, then we would have to get EMG to see if this is truly radiculopathic or not.     I, Dr. Sullivan, personally performed the services described in this documentation as scribed by Rigoberto Saldaña in my presence, and it is both accurate and complete.

## 2017-04-04 ENCOUNTER — TELEPHONE (OUTPATIENT)
Dept: PAIN MEDICINE | Facility: CLINIC | Age: 37
End: 2017-04-04

## 2017-04-04 NOTE — TELEPHONE ENCOUNTER
----- Message from Lonny Monsivais sent at 4/3/2017  3:01 PM CDT -----  Contact: GENARO BLANTON [[id number]]  X_  1st Request  _  2nd Request  _  3rd Request        Who: GENARO BLANTON [[id number]]    Why: Patient still in a lot of pain follow injections on March 28 and patient has an upcoming procedure on April 11. Patient has a referral for physical therapy and patient would like to know if she has to wait till after the injection on Apr 11 or begin therapy now.    What Number to Call Back: [phone]    When to Expect a call back: (Before the end of the day)   -- if the call is after 12:00, the call back will be tomorrow.        Patient was contacted and informed that she can start her PT. Patient stated that she is in a lot of pain and don't see how she can start the therapy. Patient was informed that her message would be forwarded to .

## 2017-04-07 ENCOUNTER — TELEPHONE (OUTPATIENT)
Dept: PAIN MEDICINE | Facility: CLINIC | Age: 37
End: 2017-04-07

## 2017-04-07 ENCOUNTER — PATIENT MESSAGE (OUTPATIENT)
Dept: PAIN MEDICINE | Facility: CLINIC | Age: 37
End: 2017-04-07

## 2017-04-07 NOTE — TELEPHONE ENCOUNTER
Ok to hold off on PT until after this injection.  Please change the injection to a Bilateral L4 TFESI instead of just left.  Still with IV sedation

## 2017-04-07 NOTE — TELEPHONE ENCOUNTER
"Patient states that "she cannot start physical therapy yet due to the amount of pain she is in." Patient is scheduled for a Nerve block 4/11/17. Patient states "she has seen Dr. Sullivan who thinks that a nerve block would be better for her. Please review and advise.   "

## 2017-04-11 ENCOUNTER — SURGERY (OUTPATIENT)
Age: 37
End: 2017-04-11

## 2017-04-11 ENCOUNTER — HOSPITAL ENCOUNTER (OUTPATIENT)
Facility: OTHER | Age: 37
Discharge: HOME | End: 2017-04-11
Attending: ANESTHESIOLOGY | Admitting: ANESTHESIOLOGY
Payer: COMMERCIAL

## 2017-04-11 VITALS
BODY MASS INDEX: 40.48 KG/M2 | DIASTOLIC BLOOD PRESSURE: 60 MMHG | WEIGHT: 220 LBS | RESPIRATION RATE: 16 BRPM | TEMPERATURE: 99 F | HEIGHT: 62 IN | SYSTOLIC BLOOD PRESSURE: 120 MMHG | OXYGEN SATURATION: 99 % | HEART RATE: 66 BPM

## 2017-04-11 DIAGNOSIS — M51.36 DDD (DEGENERATIVE DISC DISEASE), LUMBAR: Primary | ICD-10-CM

## 2017-04-11 PROCEDURE — 99152 MOD SED SAME PHYS/QHP 5/>YRS: CPT | Mod: ,,, | Performed by: ANESTHESIOLOGY

## 2017-04-11 PROCEDURE — 25000003 PHARM REV CODE 250: Performed by: ANESTHESIOLOGY

## 2017-04-11 PROCEDURE — 64483 NJX AA&/STRD TFRM EPI L/S 1: CPT | Mod: 50,,, | Performed by: ANESTHESIOLOGY

## 2017-04-11 PROCEDURE — 64483 NJX AA&/STRD TFRM EPI L/S 1: CPT | Mod: 50 | Performed by: ANESTHESIOLOGY

## 2017-04-11 PROCEDURE — 25500020 PHARM REV CODE 255: Performed by: ANESTHESIOLOGY

## 2017-04-11 PROCEDURE — 63600175 PHARM REV CODE 636 W HCPCS: Performed by: ANESTHESIOLOGY

## 2017-04-11 RX ORDER — SODIUM CHLORIDE 9 MG/ML
INJECTION, SOLUTION INTRAVENOUS CONTINUOUS
Status: DISCONTINUED | OUTPATIENT
Start: 2017-04-11 | End: 2017-04-11 | Stop reason: HOSPADM

## 2017-04-11 RX ORDER — FENTANYL CITRATE 50 UG/ML
50 INJECTION, SOLUTION INTRAMUSCULAR; INTRAVENOUS ONCE
Status: COMPLETED | OUTPATIENT
Start: 2017-04-11 | End: 2017-04-11

## 2017-04-11 RX ORDER — BUPIVACAINE HYDROCHLORIDE 2.5 MG/ML
10 INJECTION, SOLUTION EPIDURAL; INFILTRATION; INTRACAUDAL ONCE
Status: COMPLETED | OUTPATIENT
Start: 2017-04-11 | End: 2017-04-11

## 2017-04-11 RX ORDER — DEXAMETHASONE SODIUM PHOSPHATE 10 MG/ML
10 INJECTION INTRAMUSCULAR; INTRAVENOUS ONCE
Status: DISCONTINUED | OUTPATIENT
Start: 2017-04-11 | End: 2017-04-11 | Stop reason: HOSPADM

## 2017-04-11 RX ORDER — METHYLPREDNISOLONE ACETATE 80 MG/ML
INJECTION, SUSPENSION INTRA-ARTICULAR; INTRALESIONAL; INTRAMUSCULAR; SOFT TISSUE
Status: DISCONTINUED | OUTPATIENT
Start: 2017-04-11 | End: 2017-04-11 | Stop reason: HOSPADM

## 2017-04-11 RX ORDER — MIDAZOLAM HYDROCHLORIDE 1 MG/ML
2 INJECTION INTRAMUSCULAR; INTRAVENOUS CONTINUOUS PRN
Status: DISCONTINUED | OUTPATIENT
Start: 2017-04-11 | End: 2017-04-11 | Stop reason: HOSPADM

## 2017-04-11 RX ORDER — LIDOCAINE HYDROCHLORIDE 10 MG/ML
10 INJECTION INFILTRATION; PERINEURAL ONCE
Status: COMPLETED | OUTPATIENT
Start: 2017-04-11 | End: 2017-04-11

## 2017-04-11 RX ORDER — MIDAZOLAM HYDROCHLORIDE 2 MG/2ML
INJECTION, SOLUTION INTRAMUSCULAR; INTRAVENOUS
Status: DISCONTINUED | OUTPATIENT
Start: 2017-04-11 | End: 2017-04-11 | Stop reason: HOSPADM

## 2017-04-11 RX ADMIN — LIDOCAINE HYDROCHLORIDE 10 ML: 10 INJECTION, SOLUTION INFILTRATION; PERINEURAL at 09:04

## 2017-04-11 RX ADMIN — FENTANYL CITRATE 50 MCG: 50 INJECTION, SOLUTION INTRAMUSCULAR; INTRAVENOUS at 09:04

## 2017-04-11 RX ADMIN — BUPIVACAINE HYDROCHLORIDE 10 ML: 2.5 INJECTION, SOLUTION EPIDURAL; INFILTRATION; INTRACAUDAL; PERINEURAL at 09:04

## 2017-04-11 RX ADMIN — IOHEXOL 5 ML: 300 INJECTION, SOLUTION INTRAVENOUS at 09:04

## 2017-04-11 RX ADMIN — METHYLPREDNISOLONE ACETATE 80 MG: 80 INJECTION, SUSPENSION INTRA-ARTICULAR; INTRALESIONAL; INTRAMUSCULAR; SOFT TISSUE at 09:04

## 2017-04-11 RX ADMIN — MIDAZOLAM HYDROCHLORIDE 1 MG: 1 INJECTION, SOLUTION INTRAMUSCULAR; INTRAVENOUS at 09:04

## 2017-04-11 NOTE — INTERVAL H&P NOTE
The patient has been examined and the H&P has been reviewed:    I concur with the findings and no changes have occurred since H&P was written.    No change in the location or quality of the pain since the most recent clinic visit.  No new symptoms.  She wishes to proceed with the procedure today.      NPO since MN.    Anesthesia/Surgery risks, benefits and alternative options discussed and understood by patient/family.    AAOx3 NAD  RRR  CTAB      Active Hospital Problems    Diagnosis  POA    DDD (degenerative disc disease), lumbar [M51.36]  Yes      Resolved Hospital Problems    Diagnosis Date Resolved POA   No resolved problems to display.

## 2017-04-11 NOTE — OP NOTE
Date: 04/11/2017    Procedure: Bilateral L4 Transforaminal Epidural Steroid Injection    Referring Provider: None    Pre-op diagnosis: Lumbar Radiculopathy    Post-op diagnosis: Same     Physician: Dr. Andra Burt     Assistant: Dr. Lao    Anesthestia: local/IV sedation: Versed 1 mg and fentanyl 50 mcg IV.  Conscious sedation given by MD and monitored by RN.  Total sedation time was less than 30 min.    All medications, allergies, and relevant histories were reviewed. No recent antibiotics or infections.  A time-out was taken to verify the correct patient, procedure, laterality, and appropriate medications/allergies.    Fluoroscopically-Guided, Contrast-Controlled Transforaminal Epidural Steroid Injection:     Following denial of allergy and review of potential side effects and complications including but not necessarily limited to infection, allergic reaction, local tissue breakdown, nerve injury, and paresis, the patient indicated they understood and agreed to proceed.     The patient was positioned prone and prepped and draped in the usual sterile fashion. The Right L4 pedicle was identified fluoroscopically via an oblique view. The skin was anesthetized via 25-gauge 1 needle with approximately 3 cc of bicarbonated 1% lidocaine. At this point, a 22-gauge 3.5 spinal needle was atraumatically introduced and advanced under fluoroscopic guidance to the anterosuperior aspect of the L4 neural foramen. Depth was gauged on lateral view. Needle position at approximately the 6 oclock position relative to the pedicle was confirmed in the AP view. Following negative aspiration for blood and CSF and confirming the absence of paresthesias, injection of approximately 1cc of Omnipaque 300 demonstrated excellent spread along the nerve root into the epidural space.  At this point, 1.5 cc of a mixture of 2 cc of 0.25% bupivacaine with 80 mg of Depo-Medrol was injected without complication. The needle was flushed with 1%  lidocaine withdrawn.     The procedure was then repeated in an identical manner at the level of L4 on the left.    The patient was followed post procedure and discharged under their own power in excellent condition in the company of a responsible adult.       Future Management:   If helpful, can repeat as needed.    Follow up with my clinic in 3 weeks or sooner if needed.    I certify that I provided the above services.  I was present for the entire procedure, which was performed by the fellow physician under my supervision.  There were no parts of the procedure that were performed not by myself or without my direct supervision.

## 2017-04-11 NOTE — IP AVS SNAPSHOT
[facility] Location ([facility])  [address] [place], [place] [address]  Phone: [phone]           Patient Discharge Instructions   Our goal is to set you up for success. This packet includes information on your condition, medications, and your home care.  It will help you care for yourself to prevent having to return to the hospital.     Please ask your nurse if you have any questions.      There are many details to remember when preparing to leave the hospital. Here is what you will need to do:    1. Take your medicine. If you are prescribed medications, review your Medication List on the following pages. You may have new medications to  at the pharmacy and others that you'll need to stop taking. Review the instructions for how and when to take your medications. Talk with your doctor or nurses if you are unsure of what to do.     2. Go to your follow-up appointments. Specific follow-up information is listed in the following pages. Your may be contacted by a nurse or clinical provider about future appointments. Be sure we have all of the phone numbers to reach you. Please contact your provider's office if you are unable to make an appointment.     3. Watch for warning signs. Your doctor or nurse will give you detailed warning signs to watch for and when to call for assistance. These instructions may also include educational information about your condition. If you experience any of warning signs to your health, call your doctor.           [facility]  Unless otherwise directed by your provider, please   contact [facility], our nurse care line   that is available for 24/7 assistance.     [phone] (toll-free)     Registered nurses in the [facility]   provide: appointment scheduling, clinical advisement, health education, and other advisory services.                  ** Verify the list of medication(s) below is accurate and up to date. Carry this with you in case of  emergency. If your medications have changed, please notify your healthcare provider.             Medication List      CONTINUE taking these medications        Additional Info                      alprazolam 0.5 MG tablet   Commonly known as:  XANAX   Quantity:  60 tablet   Refills:  2   Dose:  0.5 mg    Instructions:  Take 1 tablet (0.5 mg total) by mouth 2 (two) times daily. as needed for anxiety.     Begin Date    AM    Noon    PM    Bedtime       estradiol 2 MG tablet   Commonly known as:  ESTRACE   Quantity:  90 tablet   Refills:  3   Dose:  2 mg    Instructions:  Take 1 tablet (2 mg total) by mouth once daily.     Begin Date    AM    Noon    PM    Bedtime       gabapentin 300 MG capsule   Commonly known as:  NEURONTIN   Quantity:  270 capsule   Refills:  3   Dose:  900 mg    Instructions:  Take 3 capsules (900 mg total) by mouth every evening.     Begin Date    AM    Noon    PM    Bedtime       nabumetone 500 MG tablet   Commonly known as:  RELAFEN   Quantity:  60 tablet   Refills:  5   Dose:  500 mg    Instructions:  Take 1 tablet (500 mg total) by mouth 2 (two) times daily as needed for Pain.     Begin Date    AM    Noon    PM    Bedtime       pantoprazole 40 MG tablet   Commonly known as:  PROTONIX   Refills:  5   Dose:  40 mg    Instructions:  Take 40 mg by mouth once daily.     Begin Date    AM    Noon    PM    Bedtime       sertraline 100 MG tablet   Commonly known as:  ZOLOFT   Quantity:  90 tablet   Refills:  3   Dose:  100 mg   Comments:  **Patient requests 90 days supply**    Instructions:  Take 1 tablet (100 mg total) by mouth once daily.     Begin Date    AM    Noon    PM    Bedtime       sumatriptan 25 MG Tab   Commonly known as:  IMITREX   Quantity:  10 tablet   Refills:  5   Dose:  25 mg    Instructions:  Take 1 tablet (25 mg total) by mouth every 2 (two) hours as needed.     Begin Date    AM    Noon    PM    Bedtime       tizanidine 4 MG tablet   Commonly known as:  ZANAFLEX   Quantity:  30  tablet   Refills:  2   Dose:  4 mg    Instructions:  Take 1 tablet (4 mg total) by mouth nightly as needed.     Begin Date    AM    Noon    PM    Bedtime       topiramate 25 MG tablet   Commonly known as:  TOPAMAX   Quantity:  180 tablet   Refills:  2   Comments:  **Patient requests 90 days supply**    Instructions:  TAKE 1 TABLET BY MOUTH EVERY NIGHT AT BEDTIME FOR 7 DAYS THEN INCREASE TO 2 TABLETS BY MOUTH EVERY NIGHT AT BEDTIME     Begin Date    AM    Noon    PM    Bedtime       zolpidem 5 MG Tab   Commonly known as:  AMBIEN   Quantity:  30 tablet   Refills:  2   Dose:  5 mg    Instructions:  Take 1 tablet (5 mg total) by mouth nightly as needed.     Begin Date    AM    Noon    PM    Bedtime                  Please bring to all follow up appointments:    1. A copy of your discharge instructions.  2. All medicines you are currently taking in their original bottles.  3. Identification and insurance card.    Please arrive 15 minutes ahead of scheduled appointment time.    Please call 24 hours in advance if you must reschedule your appointment and/or time.        Your Scheduled Appointments     May 03, 2017 10:15 AM CDT   Established Patient Visit with Andra Burt MD   [facility] ([facility])    [address] [facility] [address] [phone]            Jul 10, 2017  1:00 PM CDT   EMG with MD Carroll [facility] - Neurology ([facility] )    [address]  [place] [address]   [phone]            Jul 10, 2017  2:20 PM CDT   Established Patient Visit with ALLYN Bill - Neurosurgery 7th Fl ([facility] )    [address]  [place] [address] [phone]              Your Future Surgeries/Procedures     Apr 11, 2017   Surgery with Andra Burt MD   [facility] ([facility])    [address]  [place] [address] [phone]                  Discharge  "Instructions       Home Care Instructions Pain Management:    1. DIET:   You may resume your normal diet today.   2. BATHING:   You may shower with luke warm water.  3. DRESSING:   You may remove your bandage today.   4. ACTIVITY LEVEL:   You may resume your normal activities 24 hrs after your procedure.  5. MEDICATIONS:   You may resume your normal medications today.   6. SPECIAL INSTRUCTIONS:   No heat to the injection site for 24 hrs including, bath or shower, heating pad, moist heat, or hot tubs.    Use ice pack to injection site for any pain or discomfort.  Apply ice packs for 20 minute intervals as needed.   If you have received any sedatives by mouth today you may not drive for 12 hours.    If you have received any sedation through your IV, you may not drive for 24 hrs.     PLEASE CALL YOUR DOCTOR IF:  1. Redness or swelling around the injection site.  2. Fever of 101 degrees  3. Drainage (pus) from the injection site.  4. For any continuous bleeding (some dried blood over the incision is normal.)    FOR EMERGENCIES:   If any unusual problems or difficulties occur during clinic hours, call [phone] or [phone].         Admission Information     Date & Time Provider Department CSN    4/11/2017  7:55 AM Andra Burt MD [facility] [id number]      Care Providers     Provider Role Specialty Primary office phone    Andra Burt MD Attending Provider Pain Medicine [phone]    Andra Burt MD Surgeon  Pain Medicine [phone]      Your Vitals Were     BP Pulse Temp Resp Height Weight    146/78 65 98.5 °F (36.9 °C) (Oral) 18 5' 2" (1.575 m) 99.8 kg (220 lb)    SpO2 BMI             100% 40.24 kg/m2         Recent Lab Values     No lab values to display.      Allergies as of 4/11/2017        Reactions    Penicillins Hives      Advance Directives     An advance directive is a document which, in the event you are no longer able to make decisions for yourself, tells your healthcare team " what kind of treatment you do or do not want to receive, or who you would like to make those decisions for you.  If you do not currently have an advance directive, [facility] encourages you to create one.  For more information call:  [phone]), [phone] ([phone]),  or log on to [email or web address].        Language Assistance Services     ATTENTION: Language assistance services are available, free of charge. Please call [phone].      ATENCIÓN: Si habla español, tiene a li disposición servicios gratuitos de asistencia lingüística. Llame al [phone].     CHÚ Ý: N?u b?n nói Ti?ng Vi?t, có các d?ch v? h? tr? ngôn ng? mi?n phí dành cho b?n. G?i s? [phone].         [facility] complies with applicable Federal civil rights laws and does not discriminate on the basis of race, color, national origin, age, disability, or sex.

## 2017-04-11 NOTE — DISCHARGE INSTRUCTIONS

## 2017-04-11 NOTE — DISCHARGE SUMMARY
Discharge Note  Short Stay      SUMMARY     Admit Date: 4/11/2017    Attending Physician: Andra Burt      Discharge Physician: Andra Burt      Discharge Date: 4/11/2017 9:07 AM    Final Diagnosis: lumbar radiculitis    Disposition: Home or self care    Patient Instructions:   Discharge Medication List as of 4/11/2017  9:27 AM      CONTINUE these medications which have NOT CHANGED    Details   alprazolam (XANAX) 0.5 MG tablet Take 1 tablet (0.5 mg total) by mouth 2 (two) times daily. as needed for anxiety., Starting 3/13/2017, Until Discontinued, Print      estradiol (ESTRACE) 2 MG tablet Take 1 tablet (2 mg total) by mouth once daily., Starting 3/24/2017, Until Discontinued, Normal      gabapentin (NEURONTIN) 300 MG capsule Take 3 capsules (900 mg total) by mouth every evening., Starting 3/24/2017, Until Mon 3/19/18, Normal      nabumetone (RELAFEN) 500 MG tablet Take 1 tablet (500 mg total) by mouth 2 (two) times daily as needed for Pain., Starting 1/26/2017, Until Tue 7/25/17, Normal      pantoprazole (PROTONIX) 40 MG tablet Take 40 mg by mouth once daily., Starting 3/23/2017, Until Discontinued, Historical Med      sertraline (ZOLOFT) 100 MG tablet Take 1 tablet (100 mg total) by mouth once daily., Starting 3/24/2017, Until Discontinued, Normal      sumatriptan (IMITREX) 25 MG Tab Take 1 tablet (25 mg total) by mouth every 2 (two) hours as needed., Starting 7/28/2016, Until Sat 4/29/17, Normal      tizanidine (ZANAFLEX) 4 MG tablet Take 1 tablet (4 mg total) by mouth nightly as needed., Starting 3/24/2017, Until Sun 4/23/17, Normal      topiramate (TOPAMAX) 25 MG tablet TAKE 1 TABLET BY MOUTH EVERY NIGHT AT BEDTIME FOR 7 DAYS THEN INCREASE TO 2 TABLETS BY MOUTH EVERY NIGHT AT BEDTIME, Normal      zolpidem (AMBIEN) 5 MG Tab Take 1 tablet (5 mg total) by mouth nightly as needed., Starting 3/13/2017, Until Discontinued, Print             Resume home diet and activity

## 2017-04-26 ENCOUNTER — TELEPHONE (OUTPATIENT)
Dept: FAMILY MEDICINE | Facility: CLINIC | Age: 37
End: 2017-04-26

## 2017-04-26 DIAGNOSIS — D50.9 MICROCYTIC ANEMIA: Primary | ICD-10-CM

## 2017-04-26 NOTE — TELEPHONE ENCOUNTER
----- Message from Marielena Miles sent at 4/26/2017  1:22 PM CDT -----  Contact: self  Pt calling to discuss several nose bleeds lately. Please call [phone].

## 2017-04-26 NOTE — TELEPHONE ENCOUNTER
Spoke with patient/ states that she has been having frequent nosebleeds and does not know what to do or what could be causing this/ patient has an appointment scheduled this Friday afternoon with Dr. Sorenson/ please advise

## 2017-04-28 ENCOUNTER — LAB VISIT (OUTPATIENT)
Dept: LAB | Facility: HOSPITAL | Age: 37
End: 2017-04-28
Attending: FAMILY MEDICINE
Payer: COMMERCIAL

## 2017-04-28 DIAGNOSIS — F41.9 ANXIETY: ICD-10-CM

## 2017-04-28 LAB
ALBUMIN SERPL BCP-MCNC: 3.3 G/DL
ALP SERPL-CCNC: 72 U/L
ALT SERPL W/O P-5'-P-CCNC: 13 U/L
ANION GAP SERPL CALC-SCNC: 7 MMOL/L
AST SERPL-CCNC: 17 U/L
BASOPHILS # BLD AUTO: 0.03 K/UL
BASOPHILS NFR BLD: 0.4 %
BILIRUB SERPL-MCNC: 0.4 MG/DL
BUN SERPL-MCNC: 17 MG/DL
CALCIUM SERPL-MCNC: 9 MG/DL
CHLORIDE SERPL-SCNC: 107 MMOL/L
CHOLEST/HDLC SERPL: 4.1 {RATIO}
CO2 SERPL-SCNC: 26 MMOL/L
CREAT SERPL-MCNC: 0.9 MG/DL
DIFFERENTIAL METHOD: ABNORMAL
EOSINOPHIL # BLD AUTO: 0.2 K/UL
EOSINOPHIL NFR BLD: 2.2 %
ERYTHROCYTE [DISTWIDTH] IN BLOOD BY AUTOMATED COUNT: 14.9 %
EST. GFR  (AFRICAN AMERICAN): >60 ML/MIN/1.73 M^2
EST. GFR  (NON AFRICAN AMERICAN): >60 ML/MIN/1.73 M^2
GLUCOSE SERPL-MCNC: 79 MG/DL
HCT VFR BLD AUTO: 39.9 %
HDL/CHOLESTEROL RATIO: 24.5 %
HDLC SERPL-MCNC: 249 MG/DL
HDLC SERPL-MCNC: 61 MG/DL
HGB BLD-MCNC: 12.8 G/DL
LDLC SERPL CALC-MCNC: 157 MG/DL
LYMPHOCYTES # BLD AUTO: 3.2 K/UL
LYMPHOCYTES NFR BLD: 42.6 %
MCH RBC QN AUTO: 23.8 PG
MCHC RBC AUTO-ENTMCNC: 32.1 %
MCV RBC AUTO: 74 FL
MONOCYTES # BLD AUTO: 0.7 K/UL
MONOCYTES NFR BLD: 8.8 %
NEUTROPHILS # BLD AUTO: 3.5 K/UL
NEUTROPHILS NFR BLD: 45.6 %
NONHDLC SERPL-MCNC: 188 MG/DL
PLATELET # BLD AUTO: 243 K/UL
PMV BLD AUTO: 9.8 FL
POTASSIUM SERPL-SCNC: 4.3 MMOL/L
PROT SERPL-MCNC: 7.1 G/DL
RBC # BLD AUTO: 5.38 M/UL
SODIUM SERPL-SCNC: 140 MMOL/L
TRIGL SERPL-MCNC: 155 MG/DL
WBC # BLD AUTO: 7.58 K/UL

## 2017-04-28 PROCEDURE — 85025 COMPLETE CBC W/AUTO DIFF WBC: CPT

## 2017-04-28 PROCEDURE — 80061 LIPID PANEL: CPT

## 2017-04-28 PROCEDURE — 80053 COMPREHEN METABOLIC PANEL: CPT

## 2017-06-15 ENCOUNTER — PATIENT MESSAGE (OUTPATIENT)
Dept: FAMILY MEDICINE | Facility: CLINIC | Age: 37
End: 2017-06-15

## 2017-06-15 ENCOUNTER — TELEPHONE (OUTPATIENT)
Dept: FAMILY MEDICINE | Facility: CLINIC | Age: 37
End: 2017-06-15

## 2017-06-15 ENCOUNTER — OFFICE VISIT (OUTPATIENT)
Dept: FAMILY MEDICINE | Facility: CLINIC | Age: 37
End: 2017-06-15
Payer: COMMERCIAL

## 2017-06-15 VITALS
HEART RATE: 101 BPM | DIASTOLIC BLOOD PRESSURE: 68 MMHG | WEIGHT: 214.06 LBS | HEIGHT: 62 IN | OXYGEN SATURATION: 97 % | TEMPERATURE: 98 F | SYSTOLIC BLOOD PRESSURE: 124 MMHG | BODY MASS INDEX: 39.39 KG/M2

## 2017-06-15 DIAGNOSIS — F41.9 ANXIETY: ICD-10-CM

## 2017-06-15 DIAGNOSIS — M54.16 LEFT LUMBAR RADICULITIS: ICD-10-CM

## 2017-06-15 DIAGNOSIS — G47.9 SLEEP DISTURBANCE: ICD-10-CM

## 2017-06-15 DIAGNOSIS — O26.899 PELVIC PAIN AFFECTING PREGNANCY: Primary | ICD-10-CM

## 2017-06-15 DIAGNOSIS — N39.0 ACUTE UTI: ICD-10-CM

## 2017-06-15 DIAGNOSIS — R10.2 PELVIC PAIN AFFECTING PREGNANCY: Primary | ICD-10-CM

## 2017-06-15 DIAGNOSIS — G43.909 MIGRAINE WITHOUT STATUS MIGRAINOSUS, NOT INTRACTABLE, UNSPECIFIED MIGRAINE TYPE: ICD-10-CM

## 2017-06-15 LAB
BILIRUB SERPL-MCNC: POSITIVE MG/DL
BLOOD URINE, POC: NEGATIVE
COLOR, POC UA: YELLOW
GLUCOSE UR QL STRIP: NEGATIVE
KETONES UR QL STRIP: NEGATIVE
LEUKOCYTE ESTERASE URINE, POC: NORMAL
NITRITE, POC UA: POSITIVE
PH, POC UA: 6
PROTEIN, POC: NORMAL
SPECIFIC GRAVITY, POC UA: 1.01
UROBILINOGEN, POC UA: NORMAL

## 2017-06-15 PROCEDURE — 87186 SC STD MICRODIL/AGAR DIL: CPT

## 2017-06-15 PROCEDURE — 99999 PR PBB SHADOW E&M-EST. PATIENT-LVL IV: CPT | Mod: PBBFAC,,, | Performed by: FAMILY MEDICINE

## 2017-06-15 PROCEDURE — 81002 URINALYSIS NONAUTO W/O SCOPE: CPT | Mod: S$GLB,,, | Performed by: FAMILY MEDICINE

## 2017-06-15 PROCEDURE — 87086 URINE CULTURE/COLONY COUNT: CPT

## 2017-06-15 PROCEDURE — 87077 CULTURE AEROBIC IDENTIFY: CPT

## 2017-06-15 PROCEDURE — 87088 URINE BACTERIA CULTURE: CPT

## 2017-06-15 PROCEDURE — 99213 OFFICE O/P EST LOW 20 MIN: CPT | Mod: 25,S$GLB,, | Performed by: FAMILY MEDICINE

## 2017-06-15 RX ORDER — GABAPENTIN 300 MG/1
900 CAPSULE ORAL NIGHTLY
Qty: 270 CAPSULE | Refills: 3 | Status: SHIPPED | OUTPATIENT
Start: 2017-06-15 | End: 2018-07-10 | Stop reason: SDUPTHER

## 2017-06-15 RX ORDER — ZOLPIDEM TARTRATE 5 MG/1
5 TABLET ORAL NIGHTLY PRN
Qty: 30 TABLET | Refills: 2 | Status: SHIPPED | OUTPATIENT
Start: 2017-06-15 | End: 2017-06-16 | Stop reason: SDUPTHER

## 2017-06-15 RX ORDER — ESTRADIOL 2 MG/1
2 TABLET ORAL DAILY
Qty: 90 TABLET | Refills: 3 | Status: SHIPPED | OUTPATIENT
Start: 2017-06-15 | End: 2018-09-17

## 2017-06-15 RX ORDER — TOPIRAMATE 25 MG/1
TABLET ORAL
Qty: 180 TABLET | Refills: 2 | Status: SHIPPED | OUTPATIENT
Start: 2017-06-15 | End: 2017-09-15 | Stop reason: SDUPTHER

## 2017-06-15 RX ORDER — SERTRALINE HYDROCHLORIDE 100 MG/1
100 TABLET, FILM COATED ORAL DAILY
Qty: 90 TABLET | Refills: 3 | Status: SHIPPED | OUTPATIENT
Start: 2017-06-15 | End: 2017-09-15 | Stop reason: SDUPTHER

## 2017-06-15 RX ORDER — ALPRAZOLAM 0.5 MG/1
0.5 TABLET ORAL 2 TIMES DAILY
Qty: 60 TABLET | Refills: 2 | Status: SHIPPED | OUTPATIENT
Start: 2017-06-15 | End: 2017-06-16 | Stop reason: SDUPTHER

## 2017-06-15 RX ORDER — CIPROFLOXACIN 250 MG/1
250 TABLET, FILM COATED ORAL 2 TIMES DAILY
Qty: 10 TABLET | Refills: 0 | Status: SHIPPED | OUTPATIENT
Start: 2017-06-15 | End: 2017-06-20

## 2017-06-15 RX ORDER — PHENAZOPYRIDINE HYDROCHLORIDE 200 MG/1
200 TABLET, FILM COATED ORAL 3 TIMES DAILY PRN
Qty: 6 TABLET | Refills: 0 | Status: SHIPPED | OUTPATIENT
Start: 2017-06-15 | End: 2017-06-25

## 2017-06-15 NOTE — PROGRESS NOTES
"Subjective:       Patient ID: Sarah Mukherjee is a 37 y.o. female.    Chief Complaint: Low-back Pain and Abdominal Pain (right side)    Abdominal Pain   This is a new problem. The current episode started in the past 7 days (2 days). The problem has been unchanged. The pain is located in the RUQ. The abdominal pain does not radiate. Associated symptoms include anorexia and nausea. Pertinent negatives include no constipation, diarrhea, fever, flatus, frequency, hematuria or vomiting. Nothing aggravates the pain. The pain is relieved by nothing. She has tried nothing for the symptoms. The treatment provided no relief.     Review of Systems   Constitutional: Negative for fever.   Gastrointestinal: Positive for abdominal pain, anorexia and nausea. Negative for constipation, diarrhea, flatus and vomiting.   Genitourinary: Negative for frequency and hematuria.       Objective:       Vitals:    06/15/17 1425   BP: 124/68   Pulse: 101   Temp: 98.2 °F (36.8 °C)   TempSrc: Oral   SpO2: 97%   Weight: 97.1 kg (214 lb 1.1 oz)   Height: 5' 2" (1.575 m)       Physical Exam   Constitutional: She is oriented to person, place, and time. She appears well-developed and well-nourished.   HENT:   Head: Normocephalic and atraumatic.   Neck: Normal range of motion. Neck supple.   Abdominal: Soft. Bowel sounds are normal. She exhibits no distension and no mass. There is tenderness in the right lower quadrant and suprapubic area. There is no rigidity, no rebound, no guarding and no CVA tenderness. No hernia.   Neurological: She is alert and oriented to person, place, and time.       Assessment:       1. Pelvic pain affecting pregnancy    2. Acute UTI        Plan:       Sarah was seen today for low-back pain and abdominal pain.    Diagnoses and all orders for this visit:    Pelvic pain affecting pregnancy  -     POCT urine dipstick without microscope    Acute UTI  -     Urine culture  -     ciprofloxacin HCl (CIPRO) 250 MG tablet; Take 1 " tablet (250 mg total) by mouth 2 (two) times daily.    Other orders  -     phenazopyridine (PYRIDIUM) 200 MG tablet; Take 1 tablet (200 mg total) by mouth 3 (three) times daily as needed for Pain.

## 2017-06-15 NOTE — TELEPHONE ENCOUNTER
----- Message from Sunshine Mensah sent at 6/15/2017  3:08 PM CDT -----  Contact: [phone]  Pt is requesting refills on all her prescribed medications Please call pt at your earliest convenience. Thanks !

## 2017-06-16 ENCOUNTER — TELEPHONE (OUTPATIENT)
Dept: FAMILY MEDICINE | Facility: CLINIC | Age: 37
End: 2017-06-16

## 2017-06-16 DIAGNOSIS — G47.9 SLEEP DISTURBANCE: ICD-10-CM

## 2017-06-16 DIAGNOSIS — F41.9 ANXIETY: ICD-10-CM

## 2017-06-16 RX ORDER — ALPRAZOLAM 0.5 MG/1
0.5 TABLET ORAL 2 TIMES DAILY
Qty: 60 TABLET | Refills: 2 | Status: SHIPPED | OUTPATIENT
Start: 2017-06-16 | End: 2017-09-15 | Stop reason: SDUPTHER

## 2017-06-16 RX ORDER — ZOLPIDEM TARTRATE 5 MG/1
5 TABLET ORAL NIGHTLY PRN
Qty: 30 TABLET | Refills: 2 | Status: SHIPPED | OUTPATIENT
Start: 2017-06-16 | End: 2017-09-15 | Stop reason: SDUPTHER

## 2017-06-16 NOTE — TELEPHONE ENCOUNTER
----- Message from Marielena Miles sent at 6/16/2017 12:37 PM CDT -----  Contact: self  Pt calling to state that the pharmacy still has not received scripts of alprazolam (XANAX) 0.5 MG tablet or zolpidem (AMBIEN) 5 MG Tab. Please fax scripts to [facility]

## 2017-06-18 LAB — BACTERIA UR CULT: NORMAL

## 2017-07-07 ENCOUNTER — TELEPHONE (OUTPATIENT)
Dept: FAMILY MEDICINE | Facility: CLINIC | Age: 37
End: 2017-07-07

## 2017-07-07 RX ORDER — TIZANIDINE 4 MG/1
4 TABLET ORAL NIGHTLY
Qty: 30 TABLET | Refills: 2 | Status: CANCELLED | OUTPATIENT
Start: 2017-07-07 | End: 2017-08-06

## 2017-08-02 ENCOUNTER — OFFICE VISIT (OUTPATIENT)
Dept: URGENT CARE | Facility: CLINIC | Age: 37
End: 2017-08-02
Payer: COMMERCIAL

## 2017-08-02 VITALS
TEMPERATURE: 99 F | HEART RATE: 81 BPM | BODY MASS INDEX: 39.38 KG/M2 | HEIGHT: 62 IN | OXYGEN SATURATION: 98 % | RESPIRATION RATE: 12 BRPM | DIASTOLIC BLOOD PRESSURE: 88 MMHG | WEIGHT: 214 LBS | SYSTOLIC BLOOD PRESSURE: 124 MMHG

## 2017-08-02 DIAGNOSIS — L23.9 ALLERGIC DERMATITIS: Primary | ICD-10-CM

## 2017-08-02 PROCEDURE — 99214 OFFICE O/P EST MOD 30 MIN: CPT | Mod: 25,S$GLB,, | Performed by: FAMILY MEDICINE

## 2017-08-02 PROCEDURE — 96372 THER/PROPH/DIAG INJ SC/IM: CPT | Mod: S$GLB,,, | Performed by: FAMILY MEDICINE

## 2017-08-02 RX ORDER — ECONAZOLE NITRATE 10 MG/G
CREAM TOPICAL
Refills: 12 | COMMUNITY
Start: 2017-07-06 | End: 2018-09-25

## 2017-08-02 RX ORDER — TIZANIDINE 2 MG/1
TABLET ORAL
Refills: 1 | COMMUNITY
Start: 2017-07-06 | End: 2017-12-14 | Stop reason: SDUPTHER

## 2017-08-02 RX ORDER — BETAMETHASONE SODIUM PHOSPHATE AND BETAMETHASONE ACETATE 3; 3 MG/ML; MG/ML
9 INJECTION, SUSPENSION INTRA-ARTICULAR; INTRALESIONAL; INTRAMUSCULAR; SOFT TISSUE
Status: COMPLETED | OUTPATIENT
Start: 2017-08-02 | End: 2017-08-02

## 2017-08-02 RX ORDER — HYDROXYZINE PAMOATE 25 MG/1
25 CAPSULE ORAL EVERY 8 HOURS PRN
Qty: 15 CAPSULE | Refills: 0 | Status: SHIPPED | OUTPATIENT
Start: 2017-08-02 | End: 2018-07-10

## 2017-08-02 RX ORDER — METHYLPREDNISOLONE 4 MG/1
TABLET ORAL
Qty: 1 PACKAGE | Refills: 0 | Status: SHIPPED | OUTPATIENT
Start: 2017-08-02 | End: 2017-08-23

## 2017-08-02 RX ADMIN — BETAMETHASONE SODIUM PHOSPHATE AND BETAMETHASONE ACETATE 9 MG: 3; 3 INJECTION, SUSPENSION INTRA-ARTICULAR; INTRALESIONAL; INTRAMUSCULAR; SOFT TISSUE at 09:08

## 2017-08-02 NOTE — PATIENT INSTRUCTIONS
"    Contact Dermatitis  Contact dermatitis is a skin rash caused by something that touches the skin and makes it irritated and inflamed. Your skin may be red, swollen, dry, and may be cracked. Blisters may form and ooze. The rash will itch.  Contact dermatitis can form on the face and neck, backs of hands, forearms, genitals, and lower legs.  People can get contact dermatitis from lots of sources. These include:  · Plants such as poison ivy, oak, or sumac  · Chemicals in hair dyes and rinses, soaps, solvents, waxes, fingernail polish, and deodorants   · Jewelry or watchbands made of nickel  Contact dermatitis is not passed from person to person.  Talk with your healthcare provider about what may have caused the rash. A type of allergy testing called "patch testing" may be used to discover what you are allergic to. You will need to avoid the source of your rash in the future to prevent it from coming back.  Treatment is done to relieve itching and prevent the rash from coming back. The rash should go away in a few days to a few weeks.  Home care  Your healthcare provider may prescribe medicine to relieve swelling and itching. Follow all instructions when using these medicines.  General care:  · Avoid anything that heats up your skin, such as hot showers or baths, or direct sunlight. This can make itching worse.  · Apply cold compresses to soothe your sores to help relieve your symptoms. Do this for 30 minutes 3 to 4 times a day. You can make a cold compress by soaking a cloth in cold water. Squeeze out excess water. You can add colloidal oatmeal to the water to help reduce itching. For severe itching in a small area, apply an ice pack wrapped in a thin towel. Do this for 20 minutes 3 to 4 times a day.  · You can also try wet dressings. One way to do this is to wear a wet piece of clothing under a dry one. Wear a damp shirt under a dry shirt if your upper body is affected. This can relieve itching and prevent you from " scratching the affected area.  · You can also help relieve large areas of itching by taking a lukewarm bath with colloidal oatmeal added to the water.  · Use hydrocortisone cream for redness and irritation, unless another medicine was prescribed. You can also use benzocaine anesthetic cream or spray. Calamine lotion can also relieve mild symptoms.  · Use oral diphenhydramine to help reduce itching. You can buy this antihistamine at drug and grocery stores. It can make you sleepy, so use lower doses during the daytime. Or you can use loratadine. This is an antihistamine that will not make you sleepy. Do not use diphenhydramine if you have glaucoma or have trouble urinating due to an enlarged prostate.  · If a plant causes your rash, make sure to wash your skin and the clothes you were wearing when you came into contact with the plant. This is to wash away the plant oils that gave you the rash and prevent more or worse symptoms.  · Stay away from the substance or object that causes your symptoms. If you cant avoid it, wear gloves or some other type of protection.  Follow-up care  Follow up with your healthcare provider, or as advised.  When to seek medical advice  Call your healthcare provider right away if any of these occur:  · Spreading of the rash to other parts of your body  · Severe swelling of your face, eyelids, mouth, throat or tongue  · Trouble urinating due to swelling in the genital area  · Fever of 100.4°F (38°C) or higher  · Redness or swelling that gets worse  · Pain that gets worse  · Foul-smelling fluid leaking from the skin  · Yellow-brown crusts on the open blisters  Date Last Reviewed: 9/1/2016  © 0426-9852 [employer]. [address] [place] [address], [place] [address]. All rights reserved. This information is not intended as a substitute for professional medical care. Always follow your healthcare professional's instructions.      Since you had a steroid shot today, please wait and start  the oral prednisone prescription tomorrow.

## 2017-08-02 NOTE — PROGRESS NOTES
"Subjective:       Patient ID: Sarah Mukherjee is a 37 y.o. female.    Vitals:  height is 5' 2" (1.575 m) and weight is 97.1 kg (214 lb). Her oral temperature is 99.2 °F (37.3 °C). Her blood pressure is 124/88 and her pulse is 81. Her respiration is 12 and oxygen saturation is 98%.     Chief Complaint: Rash    Rash   This is a new problem. The current episode started yesterday. The problem has been rapidly worsening since onset. The affected locations include the face, neck, chest, torso, back, abdomen, left arm, right arm, right axilla, left axilla, right hip, right upper leg and left upper leg. The rash is characterized by itchiness and redness. She was exposed to nothing. Pertinent negatives include no fever, joint pain, shortness of breath or sore throat. Past treatments include nothing. The treatment provided no relief. There is no history of allergies, asthma, eczema or varicella.     Review of Systems   Constitution: Negative for chills and fever.   HENT: Negative for sore throat.    Respiratory: Negative for shortness of breath.    Skin: Positive for dry skin, itching and rash.   Musculoskeletal: Negative for joint pain.       Objective:      Physical Exam   Constitutional: She is oriented to person, place, and time. She appears well-developed and well-nourished.   HENT:   Head: Normocephalic and atraumatic. Head is without abrasion, without contusion and without laceration.   Nose: Nose normal.   Mouth/Throat: Oropharynx is clear and moist.   Eyes: Conjunctivae, EOM and lids are normal. Pupils are equal, round, and reactive to light.   Neck: Trachea normal, full passive range of motion without pain and phonation normal. Neck supple.   Cardiovascular: Normal rate.    Pulmonary/Chest: Effort normal and breath sounds normal. No stridor. No respiratory distress.   Musculoskeletal: Normal range of motion.   Neurological: She is alert and oriented to person, place, and time.   Skin: Skin is warm, dry and intact. " Capillary refill takes less than 2 seconds. Rash noted. No abrasion, no bruising, no burn, no ecchymosis, no laceration and no lesion noted. Rash is maculopapular. No erythema.        Blanches   Psychiatric: She has a normal mood and affect. Her speech is normal and behavior is normal. Judgment and thought content normal. Cognition and memory are normal.   Nursing note and vitals reviewed.      Assessment:       1. Allergic dermatitis        Plan:         Allergic dermatitis  -     methylPREDNISolone (MEDROL DOSEPACK) 4 mg tablet; use as directed  Dispense: 1 Package; Refill: 0  -     betamethasone acetate-betamethasone sodium phosphate injection 9 mg; Inject 1.5 mLs (9 mg total) into the muscle one time.  -     hydrOXYzine pamoate (VISTARIL) 25 MG Cap; Take 1 capsule (25 mg total) by mouth every 8 (eight) hours as needed.  Dispense: 15 capsule; Refill: 0      Since you had a steroid shot today, please wait and start the oral prednisone prescription tomorrow.

## 2017-08-28 ENCOUNTER — LAB VISIT (OUTPATIENT)
Dept: LAB | Facility: HOSPITAL | Age: 37
End: 2017-08-28
Attending: SURGERY
Payer: COMMERCIAL

## 2017-08-28 DIAGNOSIS — K80.20 CALCULUS OF GALLBLADDER WITHOUT MENTION OF CHOLECYSTITIS OR OBSTRUCTION: Primary | ICD-10-CM

## 2017-08-28 PROCEDURE — 88304 TISSUE EXAM BY PATHOLOGIST: CPT | Mod: 26,,, | Performed by: PATHOLOGY

## 2017-08-28 PROCEDURE — 88304 TISSUE EXAM BY PATHOLOGIST: CPT | Performed by: PATHOLOGY

## 2017-09-13 ENCOUNTER — TELEPHONE (OUTPATIENT)
Dept: FAMILY MEDICINE | Facility: CLINIC | Age: 37
End: 2017-09-13

## 2017-09-13 NOTE — TELEPHONE ENCOUNTER
----- Message from Amy Diaz sent at 9/13/2017 11:19 AM CDT -----  Contact: Self   Patient says she has a kidney infection and her urologist can't see her right now she would like to know if Dr. Sorenson can order an ultrasound or testing. Because she has taken antibiotics but she still has lots of pain and blood. Please call at [phone].

## 2017-09-15 ENCOUNTER — HOSPITAL ENCOUNTER (OUTPATIENT)
Dept: RADIOLOGY | Facility: HOSPITAL | Age: 37
Discharge: HOME | End: 2017-09-15
Attending: FAMILY MEDICINE
Payer: COMMERCIAL

## 2017-09-15 ENCOUNTER — OFFICE VISIT (OUTPATIENT)
Dept: FAMILY MEDICINE | Facility: CLINIC | Age: 37
End: 2017-09-15
Payer: COMMERCIAL

## 2017-09-15 VITALS
BODY MASS INDEX: 40.21 KG/M2 | WEIGHT: 218.5 LBS | OXYGEN SATURATION: 98 % | SYSTOLIC BLOOD PRESSURE: 124 MMHG | TEMPERATURE: 98 F | DIASTOLIC BLOOD PRESSURE: 84 MMHG | HEART RATE: 70 BPM | HEIGHT: 62 IN

## 2017-09-15 DIAGNOSIS — R10.9 RIGHT FLANK PAIN: ICD-10-CM

## 2017-09-15 DIAGNOSIS — G47.9 SLEEP DISTURBANCE: ICD-10-CM

## 2017-09-15 DIAGNOSIS — F41.9 ANXIETY: ICD-10-CM

## 2017-09-15 DIAGNOSIS — R30.0 DYSURIA: Primary | ICD-10-CM

## 2017-09-15 DIAGNOSIS — G43.909 MIGRAINE WITHOUT STATUS MIGRAINOSUS, NOT INTRACTABLE, UNSPECIFIED MIGRAINE TYPE: ICD-10-CM

## 2017-09-15 DIAGNOSIS — R31.9 HEMATURIA: ICD-10-CM

## 2017-09-15 LAB
BILIRUB SERPL-MCNC: NORMAL MG/DL
BLOOD URINE, POC: 250
COLOR, POC UA: NORMAL
GLUCOSE UR QL STRIP: NORMAL
KETONES UR QL STRIP: NORMAL
LEUKOCYTE ESTERASE URINE, POC: NORMAL
NITRITE, POC UA: NORMAL
PH, POC UA: 5
PROTEIN, POC: NORMAL
SPECIFIC GRAVITY, POC UA: 1.02
UROBILINOGEN, POC UA: NORMAL

## 2017-09-15 PROCEDURE — 74176 CT ABD & PELVIS W/O CONTRAST: CPT | Mod: TC

## 2017-09-15 PROCEDURE — 99999 PR PBB SHADOW E&M-EST. PATIENT-LVL III: CPT | Mod: PBBFAC,,, | Performed by: FAMILY MEDICINE

## 2017-09-15 PROCEDURE — 3008F BODY MASS INDEX DOCD: CPT | Mod: S$GLB,,, | Performed by: FAMILY MEDICINE

## 2017-09-15 PROCEDURE — 87086 URINE CULTURE/COLONY COUNT: CPT

## 2017-09-15 PROCEDURE — 81002 URINALYSIS NONAUTO W/O SCOPE: CPT | Mod: S$GLB,,, | Performed by: FAMILY MEDICINE

## 2017-09-15 PROCEDURE — 74176 CT ABD & PELVIS W/O CONTRAST: CPT | Mod: 26,,, | Performed by: RADIOLOGY

## 2017-09-15 PROCEDURE — 99214 OFFICE O/P EST MOD 30 MIN: CPT | Mod: 25,S$GLB,, | Performed by: FAMILY MEDICINE

## 2017-09-15 RX ORDER — TOPIRAMATE 25 MG/1
TABLET ORAL
Qty: 180 TABLET | Refills: 2 | Status: SHIPPED | OUTPATIENT
Start: 2017-09-15 | End: 2018-09-17

## 2017-09-15 RX ORDER — ZOLPIDEM TARTRATE 5 MG/1
5 TABLET ORAL NIGHTLY PRN
Qty: 30 TABLET | Refills: 2 | Status: SHIPPED | OUTPATIENT
Start: 2017-09-15 | End: 2017-12-14 | Stop reason: SDUPTHER

## 2017-09-15 RX ORDER — SERTRALINE HYDROCHLORIDE 100 MG/1
100 TABLET, FILM COATED ORAL DAILY
Qty: 90 TABLET | Refills: 3 | Status: SHIPPED | OUTPATIENT
Start: 2017-09-15 | End: 2017-12-14 | Stop reason: SDUPTHER

## 2017-09-15 RX ORDER — ALPRAZOLAM 0.5 MG/1
0.5 TABLET ORAL 2 TIMES DAILY
Qty: 60 TABLET | Refills: 2 | Status: SHIPPED | OUTPATIENT
Start: 2017-09-15 | End: 2017-12-14 | Stop reason: SDUPTHER

## 2017-09-15 RX ORDER — SULFAMETHOXAZOLE AND TRIMETHOPRIM 800; 160 MG/1; MG/1
1 TABLET ORAL 2 TIMES DAILY
Qty: 10 TABLET | Refills: 0 | Status: SHIPPED | OUTPATIENT
Start: 2017-09-15 | End: 2017-09-20

## 2017-09-15 NOTE — PROGRESS NOTES
"Subjective:       Patient ID: Sarah Mukherjee is a 37 y.o. female.    Chief Complaint: Follow Up/ Kidney Infection; Renew Meds; and Back Pain    Patient presents with concerns about a UTI. She hd dysuria and hematuria 2 weeks ago and was put on CIPRO by Dr. Andra Garcia She has hd flank pain as well. She states her right flank pain has not stopped. She states she still has blood and blood clots.   She also needs refills.       Review of Systems   Constitutional: Negative for activity change, chills and unexpected weight change.   HENT: Negative for hearing loss, rhinorrhea and trouble swallowing.    Eyes: Negative for discharge and visual disturbance.   Respiratory: Negative for chest tightness and wheezing.    Cardiovascular: Negative for palpitations.   Gastrointestinal: Positive for diarrhea. Negative for blood in stool, constipation, nausea and vomiting.   Endocrine: Negative for polydipsia and polyuria.   Genitourinary: Positive for hematuria. Negative for difficulty urinating and menstrual problem.   Musculoskeletal: Negative for arthralgias, joint swelling and neck pain.   Psychiatric/Behavioral: Positive for dysphoric mood. Negative for confusion.       Objective:       Vitals:    09/15/17 1108   BP: 124/84   Pulse: 70   Temp: 98.2 °F (36.8 °C)   TempSrc: Oral   SpO2: 98%   Weight: 99.1 kg (218 lb 7.6 oz)   Height: 5' 2" (1.575 m)       Physical Exam   Constitutional: She is oriented to person, place, and time. She appears well-developed and well-nourished. No distress.   HENT:   Head: Normocephalic and atraumatic.   Eyes: Conjunctivae are normal.   Neck: Normal range of motion. Neck supple. Carotid bruit is not present.   Cardiovascular: Normal rate, regular rhythm and normal heart sounds.  Exam reveals no gallop and no friction rub.    No murmur heard.  Pulmonary/Chest: Effort normal and breath sounds normal. No respiratory distress. She has no wheezes. She has no rales.   Abdominal: Soft. Bowel sounds " are normal. She exhibits no distension and no mass. There is no tenderness. There is no guarding.   Musculoskeletal: She exhibits no edema.   Neurological: She is alert and oriented to person, place, and time.   Skin: She is not diaphoretic.       Assessment:       1. Dysuria    2. Sleep disturbance    3. Migraine without status migrainosus, not intractable, unspecified migraine type    4. Anxiety    5. Right flank pain    6. Hematuria        Plan:       Sarah was seen today for follow up/ kidney infection, renew meds and back pain.    Diagnoses and all orders for this visit:    Dysuria  -     POCT URINE DIPSTICK WITHOUT MICROSCOPE  -     Urine culture  -     Comprehensive metabolic panel; Future  -     sulfamethoxazole-trimethoprim 800-160mg (BACTRIM DS) 800-160 mg Tab; Take 1 tablet by mouth 2 (two) times daily.  Starting bactrim and will culture urine    Sleep disturbance  -     zolpidem (AMBIEN) 5 MG Tab; Take 1 tablet (5 mg total) by mouth nightly as needed.  Stable. Refilled meds.     Migraine without status migrainosus, not intractable, unspecified migraine type  -     topiramate (TOPAMAX) 25 MG tablet; TAKE 1 TABLET BY MOUTH EVERY NIGHT AT BEDTIME FOR 7 DAYS THEN INCREASE TO 2 TABLETS BY MOUTH EVERY NIGHT AT BEDTIME  Stable. Refilled meds.     Anxiety  -     sertraline (ZOLOFT) 100 MG tablet; Take 1 tablet (100 mg total) by mouth once daily.  -     alprazolam (XANAX) 0.5 MG tablet; Take 1 tablet (0.5 mg total) by mouth 2 (two) times daily. as needed for anxiety.  Stable. Refilled meds.     Right flank pain  -     CT Abdomen Pelvis  Without Contrast; Future    Hematuria  -     CT Abdomen Pelvis  Without Contrast; Future

## 2017-09-17 ENCOUNTER — PATIENT MESSAGE (OUTPATIENT)
Dept: FAMILY MEDICINE | Facility: CLINIC | Age: 37
End: 2017-09-17

## 2017-09-17 LAB — BACTERIA UR CULT: NORMAL

## 2017-12-06 RX ORDER — NABUMETONE 500 MG/1
TABLET, FILM COATED ORAL
Qty: 60 TABLET | Refills: 2 | Status: SHIPPED | OUTPATIENT
Start: 2017-12-06 | End: 2018-09-17

## 2017-12-14 ENCOUNTER — OFFICE VISIT (OUTPATIENT)
Dept: FAMILY MEDICINE | Facility: CLINIC | Age: 37
End: 2017-12-14
Payer: MEDICARE

## 2017-12-14 VITALS
TEMPERATURE: 98 F | HEIGHT: 62 IN | WEIGHT: 216.69 LBS | DIASTOLIC BLOOD PRESSURE: 80 MMHG | HEART RATE: 72 BPM | BODY MASS INDEX: 39.88 KG/M2 | SYSTOLIC BLOOD PRESSURE: 118 MMHG | OXYGEN SATURATION: 97 %

## 2017-12-14 DIAGNOSIS — G47.9 SLEEP DISTURBANCE: ICD-10-CM

## 2017-12-14 DIAGNOSIS — G43.909 MIGRAINE WITHOUT STATUS MIGRAINOSUS, NOT INTRACTABLE, UNSPECIFIED MIGRAINE TYPE: ICD-10-CM

## 2017-12-14 DIAGNOSIS — M62.838 MUSCLE SPASM: Primary | ICD-10-CM

## 2017-12-14 DIAGNOSIS — F41.9 ANXIETY: ICD-10-CM

## 2017-12-14 PROCEDURE — 99214 OFFICE O/P EST MOD 30 MIN: CPT | Mod: S$PBB,,, | Performed by: FAMILY MEDICINE

## 2017-12-14 PROCEDURE — 99999 PR PBB SHADOW E&M-EST. PATIENT-LVL III: CPT | Mod: PBBFAC,,, | Performed by: FAMILY MEDICINE

## 2017-12-14 PROCEDURE — 99213 OFFICE O/P EST LOW 20 MIN: CPT | Mod: PBBFAC,PO | Performed by: FAMILY MEDICINE

## 2017-12-14 RX ORDER — ZOLPIDEM TARTRATE 5 MG/1
5 TABLET ORAL NIGHTLY PRN
Qty: 30 TABLET | Refills: 2 | Status: SHIPPED | OUTPATIENT
Start: 2017-12-14 | End: 2018-03-15 | Stop reason: SDUPTHER

## 2017-12-14 RX ORDER — SUMATRIPTAN SUCCINATE 25 MG/1
25 TABLET ORAL
Qty: 10 TABLET | Refills: 5 | Status: SHIPPED | OUTPATIENT
Start: 2017-12-14 | End: 2018-07-10

## 2017-12-14 RX ORDER — DICYCLOMINE HYDROCHLORIDE 20 MG/1
20 TABLET ORAL EVERY 6 HOURS
COMMUNITY
End: 2018-09-17

## 2017-12-14 RX ORDER — SERTRALINE HYDROCHLORIDE 100 MG/1
100 TABLET, FILM COATED ORAL DAILY
Qty: 90 TABLET | Refills: 3 | Status: SHIPPED | OUTPATIENT
Start: 2017-12-14 | End: 2018-03-15

## 2017-12-14 RX ORDER — TIZANIDINE 2 MG/1
TABLET ORAL
Qty: 45 TABLET | Refills: 1 | Status: SHIPPED | OUTPATIENT
Start: 2017-12-14 | End: 2018-05-08 | Stop reason: SDUPTHER

## 2017-12-14 RX ORDER — ALPRAZOLAM 0.5 MG/1
0.5 TABLET ORAL 2 TIMES DAILY
Qty: 60 TABLET | Refills: 2 | Status: SHIPPED | OUTPATIENT
Start: 2017-12-14 | End: 2018-03-16 | Stop reason: SDUPTHER

## 2017-12-14 NOTE — PROGRESS NOTES
"Subjective:       Patient ID: Sarah Mukherjee is a 37 y.o. female.    Chief Complaint: Renew Medications    Patient presents for refills of her medication. She is on zolfot for her mood and is doing well. She takes ambien for her sleep and is doing well. She states she falls asleep and can't stay asleep so she takes ambien for this. She is aware that she shouldn't take this with her xanax.       Review of Systems   Constitutional: Negative for fatigue.   Respiratory: Negative for cough, chest tightness and shortness of breath.    Cardiovascular: Negative for chest pain, palpitations and leg swelling.   Gastrointestinal: Negative for abdominal pain.   Neurological: Negative for dizziness, syncope, light-headedness and headaches.       Objective:       Vitals:    12/14/17 1451   BP: 118/80   Pulse: 72   Temp: 98 °F (36.7 °C)   TempSrc: Oral   SpO2: 97%   Weight: 98.3 kg (216 lb 11.4 oz)   Height: 5' 2" (1.575 m)       Physical Exam   Constitutional: She is oriented to person, place, and time. She appears well-developed and well-nourished. No distress.   HENT:   Head: Normocephalic and atraumatic.   Neck: Normal range of motion. Neck supple.   Cardiovascular: Normal rate, regular rhythm and normal heart sounds.  Exam reveals no gallop and no friction rub.    No murmur heard.  Pulmonary/Chest: Effort normal and breath sounds normal. No respiratory distress. She has no wheezes. She has no rales.   Musculoskeletal: She exhibits no edema.        Arms:  Neurological: She is alert and oriented to person, place, and time.   Skin: She is not diaphoretic.       Assessment:       1. Sleep disturbance    2. Anxiety    3. Migraine without status migrainosus, not intractable, unspecified migraine type        Plan:       Sarah was seen today for renew medications.    Diagnoses and all orders for this visit:    Sleep disturbance  -     zolpidem (AMBIEN) 5 MG Tab; Take 1 tablet (5 mg total) by mouth nightly as needed.  Stable. " Refilled meds.     Anxiety  -     ALPRAZolam (XANAX) 0.5 MG tablet; Take 1 tablet (0.5 mg total) by mouth 2 (two) times daily. as needed for anxiety.  -     sertraline (ZOLOFT) 100 MG tablet; Take 1 tablet (100 mg total) by mouth once daily.  Stable. Refilled meds.     Migraine without status migrainosus, not intractable, unspecified migraine type  -     sumatriptan (IMITREX) 25 MG Tab; Take 1 tablet (25 mg total) by mouth every 2 (two) hours as needed.    Other orders  -     tiZANidine (ZANAFLEX) 2 MG tablet;

## 2018-02-17 NOTE — TELEPHONE ENCOUNTER
-Fentanyl taper - 50mcg/hr on admit -> 37mcg/hr on 1/26/18, 37 -> 25mcg/hr on 2/1/18-->12mcg/hr on 2/6/18. Discontinued new patches 2/9/18    Tolerating fentanyl taper, though endorsed nausea and myoclonic jerks    -baclofen 10mg BID - increase qHS dose to 15 mg  -Gabapentin 800mg TID  -Acetaminophen 500mg q6h PRN     Withdrawal PRNs:  Tylenol, Bentyl, Zofran   Nosebleed x 3 weeks, no problems with BP, has ENT 5/3, dizziness, advise hydration, lean forward and hold pressure,  nasal saline spray or vaseline, check labs, pt verbalize understanding

## 2018-03-15 ENCOUNTER — OFFICE VISIT (OUTPATIENT)
Dept: FAMILY MEDICINE | Facility: CLINIC | Age: 38
End: 2018-03-15
Payer: COMMERCIAL

## 2018-03-15 ENCOUNTER — LAB VISIT (OUTPATIENT)
Dept: LAB | Facility: HOSPITAL | Age: 38
End: 2018-03-15
Attending: FAMILY MEDICINE
Payer: COMMERCIAL

## 2018-03-15 ENCOUNTER — PATIENT MESSAGE (OUTPATIENT)
Dept: FAMILY MEDICINE | Facility: CLINIC | Age: 38
End: 2018-03-15

## 2018-03-15 VITALS — OXYGEN SATURATION: 98 % | BODY MASS INDEX: 39.27 KG/M2 | HEART RATE: 68 BPM | WEIGHT: 214.75 LBS

## 2018-03-15 DIAGNOSIS — F39 MOOD DISORDER: Primary | ICD-10-CM

## 2018-03-15 DIAGNOSIS — K52.9 CHRONIC DIARRHEA: ICD-10-CM

## 2018-03-15 DIAGNOSIS — G47.9 SLEEP DISTURBANCE: ICD-10-CM

## 2018-03-15 DIAGNOSIS — F41.9 ANXIETY: ICD-10-CM

## 2018-03-15 PROCEDURE — 82784 ASSAY IGA/IGD/IGG/IGM EACH: CPT

## 2018-03-15 PROCEDURE — 83516 IMMUNOASSAY NONANTIBODY: CPT

## 2018-03-15 PROCEDURE — 99214 OFFICE O/P EST MOD 30 MIN: CPT | Mod: S$GLB,,, | Performed by: FAMILY MEDICINE

## 2018-03-15 PROCEDURE — 99999 PR PBB SHADOW E&M-EST. PATIENT-LVL II: CPT | Mod: PBBFAC,,, | Performed by: FAMILY MEDICINE

## 2018-03-15 PROCEDURE — 36415 COLL VENOUS BLD VENIPUNCTURE: CPT | Mod: PO

## 2018-03-15 RX ORDER — ZOLPIDEM TARTRATE 5 MG/1
5 TABLET ORAL NIGHTLY PRN
Qty: 30 TABLET | Refills: 5 | Status: SHIPPED | OUTPATIENT
Start: 2018-03-15 | End: 2018-09-17 | Stop reason: SDUPTHER

## 2018-03-15 RX ORDER — SERTRALINE HYDROCHLORIDE 50 MG/1
50 TABLET, FILM COATED ORAL DAILY
Qty: 30 TABLET | Refills: 0 | Status: SHIPPED | OUTPATIENT
Start: 2018-03-15 | End: 2018-07-10 | Stop reason: ALTCHOICE

## 2018-03-15 RX ORDER — DESVENLAFAXINE SUCCINATE 50 MG/1
50 TABLET, EXTENDED RELEASE ORAL DAILY
Qty: 30 TABLET | Refills: 2 | Status: SHIPPED | OUTPATIENT
Start: 2018-03-15 | End: 2018-06-09 | Stop reason: SDUPTHER

## 2018-03-15 RX ORDER — ZOLPIDEM TARTRATE 5 MG/1
5 TABLET ORAL NIGHTLY PRN
Qty: 30 TABLET | Refills: 2 | Status: CANCELLED | OUTPATIENT
Start: 2018-03-15

## 2018-03-15 NOTE — TELEPHONE ENCOUNTER
----- Message from Sunshine Mensah sent at 3/15/2018  2:51 PM CDT -----  Contact: [phone]  Refill:zolpidem (AMBIEN) 5 MG Tab,ALPRAZolam (XANAX) 0.5 MG tablet,pantoprazole (PROTONIX) 40 MG tablet please send to [id number] - [place], [place] - [address]

## 2018-03-15 NOTE — PROGRESS NOTES
Subjective:       Patient ID: Sarah Mukherjee is a 38 y.o. female.    Chief Complaint: Discuss Health Concerns and Renew Medications    Patient presents with concerns about diarrhea since February./S She states  she saw GI AND HAD BOWEL MOVEMENTS UP TO 8 TIMES A DAY. SHE STATES SHE HAD AN UPPER GI DUE TO DARK STOOLS, WHICH WERE NORMAL. 2 WEEKS AGO SHE SAW DR. GAITAN AND HE DID AN EGD AND HAD A HIATAL HERNIA, BUT NORMAL SCOPE OTHERWISE.     SHE WAS TAKING 12 IMODIUM PER DAY WITH DICYCLOMINE. SHE STOPPED THIS AS A RESULT. SHE STATES IF SHE EATS SHE WILL USE THE BATHROOM ON HERSELF. SHE IS ON QUESTRAN DUE TO THIS AND TAKES THIS ONCE A DAY FOR THE LAST WEEK. SHE STATES IT HAS NOT HELPED. SHE CAN'T TAKE VIBERZI DUE TO HER GALLBLADDER BEING REMOVED. SHE IS TAKING PROBIOTICS. SHE HAS NOT EATEN ANYTHING FRIED.       Review of Systems    Objective:       Vitals:    03/15/18 1108   Pulse: 68   SpO2: 98%   Weight: 97.4 kg (214 lb 11.7 oz)       Physical Exam   Constitutional: She is oriented to person, place, and time. She appears well-developed and well-nourished. No distress.   HENT:   Head: Normocephalic and atraumatic.   Cardiovascular: Normal rate, regular rhythm and normal heart sounds.  Exam reveals no gallop and no friction rub.    No murmur heard.  Abdominal: Soft. Bowel sounds are normal. She exhibits no distension and no mass. There is no tenderness. There is no rebound and no guarding.   Neurological: She is alert and oriented to person, place, and time.   Skin: She is not diaphoretic.       Assessment:       1. Mood disorder    2. Chronic diarrhea    3. Sleep disturbance        Plan:       Sarah was seen today for discuss health concerns and renew medications.    Diagnoses and all orders for this visit:    Mood disorder  -     sertraline (ZOLOFT) 50 MG tablet; Take 1 tablet (50 mg total) by mouth once daily.  -     desvenlafaxine succinate (PRISTIQ) 50 MG Tb24; Take 1 tablet (50 mg total) by mouth once  daily.  TAKE ZOLOFT 50 MG  DAILY FOR 2 WEEKS, THE STOP AND START PRISTIQ 50 MG DAILY    Chronic diarrhea  -     Tissue transglutaminase, IgA; Future  -     IgA; Future    Sleep disturbance  -     zolpidem (AMBIEN) 5 MG Tab; Take 1 tablet (5 mg total) by mouth nightly as needed.

## 2018-03-16 LAB — IGA SERPL-MCNC: 290 MG/DL

## 2018-03-16 RX ORDER — PANTOPRAZOLE SODIUM 40 MG/1
40 TABLET, DELAYED RELEASE ORAL DAILY
Qty: 30 TABLET | Refills: 5 | Status: SHIPPED | OUTPATIENT
Start: 2018-03-16 | End: 2018-09-17 | Stop reason: SDUPTHER

## 2018-03-16 RX ORDER — ALPRAZOLAM 0.5 MG/1
0.5 TABLET ORAL 2 TIMES DAILY
Qty: 60 TABLET | Refills: 2 | Status: SHIPPED | OUTPATIENT
Start: 2018-03-16 | End: 2018-07-10 | Stop reason: SDUPTHER

## 2018-03-16 RX ORDER — PANTOPRAZOLE SODIUM 40 MG/1
40 TABLET, DELAYED RELEASE ORAL DAILY
Qty: 30 TABLET | Refills: 5 | Status: SHIPPED | OUTPATIENT
Start: 2018-03-16 | End: 2018-07-10 | Stop reason: SDUPTHER

## 2018-03-16 NOTE — TELEPHONE ENCOUNTER
LOV  3/17/2018  Last refill pantoprazole   3/23/2017                  Alprazolam 12/14/2017                  Zolpidem 3/15/2018

## 2018-03-20 LAB — TTG IGA SER IA-ACNC: 7 UNITS

## 2018-03-27 DIAGNOSIS — F41.9 ANXIETY: ICD-10-CM

## 2018-03-27 RX ORDER — ALPRAZOLAM 0.5 MG/1
0.5 TABLET ORAL 2 TIMES DAILY
Qty: 60 TABLET | Refills: 2 | Status: CANCELLED | OUTPATIENT
Start: 2018-03-27

## 2018-03-27 NOTE — TELEPHONE ENCOUNTER
LOV  3/15/2018  Last refill 3/16/2018    Script phoned in to [facility].

## 2018-05-08 DIAGNOSIS — M62.838 MUSCLE SPASM: ICD-10-CM

## 2018-05-08 RX ORDER — TIZANIDINE 2 MG/1
TABLET ORAL
Qty: 45 TABLET | Refills: 1 | Status: SHIPPED | OUTPATIENT
Start: 2018-05-08 | End: 2018-07-10 | Stop reason: SDUPTHER

## 2018-05-13 DIAGNOSIS — G43.909 MIGRAINE WITHOUT STATUS MIGRAINOSUS, NOT INTRACTABLE, UNSPECIFIED MIGRAINE TYPE: ICD-10-CM

## 2018-05-14 RX ORDER — TOPIRAMATE 25 MG/1
TABLET ORAL
Qty: 180 TABLET | Refills: 2 | Status: SHIPPED | OUTPATIENT
Start: 2018-05-14 | End: 2018-07-10 | Stop reason: SDUPTHER

## 2018-06-09 DIAGNOSIS — F39 MOOD DISORDER: ICD-10-CM

## 2018-06-10 RX ORDER — DESVENLAFAXINE SUCCINATE 50 MG/1
50 TABLET, EXTENDED RELEASE ORAL DAILY
Qty: 30 TABLET | Refills: 2 | Status: SHIPPED | OUTPATIENT
Start: 2018-06-10 | End: 2018-07-10 | Stop reason: SDUPTHER

## 2018-06-21 ENCOUNTER — PES CALL (OUTPATIENT)
Dept: ADMINISTRATIVE | Facility: CLINIC | Age: 38
End: 2018-06-21

## 2018-06-25 DIAGNOSIS — F41.9 ANXIETY: ICD-10-CM

## 2018-06-27 RX ORDER — ALPRAZOLAM 0.5 MG/1
TABLET ORAL
Qty: 60 TABLET | OUTPATIENT
Start: 2018-06-27

## 2018-07-10 ENCOUNTER — OFFICE VISIT (OUTPATIENT)
Dept: FAMILY MEDICINE | Facility: CLINIC | Age: 38
End: 2018-07-10
Payer: COMMERCIAL

## 2018-07-10 VITALS
WEIGHT: 211.19 LBS | HEART RATE: 75 BPM | HEIGHT: 62 IN | TEMPERATURE: 98 F | DIASTOLIC BLOOD PRESSURE: 88 MMHG | SYSTOLIC BLOOD PRESSURE: 128 MMHG | OXYGEN SATURATION: 99 % | BODY MASS INDEX: 38.86 KG/M2

## 2018-07-10 DIAGNOSIS — M54.50 LOW BACK PAIN, NON-SPECIFIC: ICD-10-CM

## 2018-07-10 DIAGNOSIS — F39 MOOD DISORDER: ICD-10-CM

## 2018-07-10 DIAGNOSIS — F41.9 ANXIETY: ICD-10-CM

## 2018-07-10 DIAGNOSIS — M54.16 LEFT LUMBAR RADICULITIS: ICD-10-CM

## 2018-07-10 DIAGNOSIS — M62.838 MUSCLE SPASM: ICD-10-CM

## 2018-07-10 DIAGNOSIS — M47.816 ARTHROPATHY OF LUMBAR FACET JOINT: Primary | ICD-10-CM

## 2018-07-10 PROCEDURE — 3008F BODY MASS INDEX DOCD: CPT | Mod: CPTII,S$GLB,, | Performed by: FAMILY MEDICINE

## 2018-07-10 PROCEDURE — 99999 PR PBB SHADOW E&M-EST. PATIENT-LVL III: CPT | Mod: PBBFAC,,, | Performed by: FAMILY MEDICINE

## 2018-07-10 PROCEDURE — 99214 OFFICE O/P EST MOD 30 MIN: CPT | Mod: S$GLB,,, | Performed by: FAMILY MEDICINE

## 2018-07-10 RX ORDER — ALPRAZOLAM 0.5 MG/1
0.5 TABLET ORAL 2 TIMES DAILY
Qty: 60 TABLET | Refills: 2 | Status: SHIPPED | OUTPATIENT
Start: 2018-07-10 | End: 2018-09-17 | Stop reason: SDUPTHER

## 2018-07-10 RX ORDER — GABAPENTIN 300 MG/1
CAPSULE ORAL
Qty: 360 CAPSULE | Refills: 3 | Status: SHIPPED | OUTPATIENT
Start: 2018-07-10 | End: 2018-09-17

## 2018-07-10 RX ORDER — TIZANIDINE 2 MG/1
TABLET ORAL
Qty: 45 TABLET | Refills: 1 | Status: SHIPPED | OUTPATIENT
Start: 2018-07-10 | End: 2018-09-17 | Stop reason: SDUPTHER

## 2018-07-10 RX ORDER — DESVENLAFAXINE SUCCINATE 50 MG/1
50 TABLET, EXTENDED RELEASE ORAL DAILY
Qty: 30 TABLET | Refills: 2 | Status: SHIPPED | OUTPATIENT
Start: 2018-07-10 | End: 2018-09-17 | Stop reason: SDUPTHER

## 2018-07-10 NOTE — PROGRESS NOTES
Subjective:       Patient ID: Sarah Mukherjee is a 38 y.o. female.    Chief Complaint: Follow Up/ Renew Medications and Follow Up/ Left Side Pain    Patient has chronic back pain and has received epidural injections and radiofrequency ablations, which has worked very well for her in the past. She states the last was not effective. She subsequently fell after Mother's day. She had another epidural mid June and it didn't improve. Her pain is in her lower back and it radiates down her buttock and the back of her thigh. She cannot sleep in her bed anymore as a result of her back pain. She has to get up and go get into the chair. She is seen by Dr. Jose Galvez.   She also needs refills of her medications for depression. She is controlled on Pristiq and xanax.     She had an MRI of her lumbar spine in 2014 that showed:  IMPRESSION:    Subtle left foraminal disk protrusion at L4-L5 causing no significant mass effect on the exiting nerve root.  There is no severe central canal or foraminal stenosis.    Past Medical History:  No date: Cancer      Comment: skin cancer removed  No date: Crohn's disease  No date: Degenerative joint disease of spine  No date: Esophagitis  No date: History of stomach ulcers  6/20/2014: Lumbar facet arthropathy  No date: Neurogenic bladder   Past Surgical History:  No date: BREAST RECONSTRUCTION  No date: HYSTERECTOMY      Comment: total hysterectomy in 20s due to fibroid,                endometriosis  No date: MASTECTOMY  No date: PELVIC LAPAROSCOPY  No date: TONSILLECTOMY  Review of patient's family history indicates:  Problem: Diabetes      Relation: Mother       Age of Onset: (Not Specified)   Problem: Hypertension      Relation: Mother       Age of Onset: (Not Specified)   Problem: Breast cancer      Relation: Maternal Aunt       Age of Onset: 47   Problem: Ovarian cancer      Relation: Maternal Aunt       Age of Onset: (Not Specified)     Social History    Marital status:             "  Spouse name:                       Years of education:                 Number of children:               Occupational History    None on file    Social History Main Topics    Smoking status: Never Smoker                                                                   Smokeless tobacco: Not on file                       Alcohol use: Yes                Comment: occ    Drug use: No              Sexual activity: Yes                  Other Topics            Concern    None on file    Social History Narrative    None on file             Review of Systems   Constitutional: Positive for activity change.   HENT: Negative for hearing loss, rhinorrhea and trouble swallowing.    Eyes: Negative for discharge and visual disturbance.   Respiratory: Negative for chest tightness and wheezing.    Cardiovascular: Negative for chest pain and palpitations.   Gastrointestinal: Negative for blood in stool, constipation, diarrhea and vomiting.   Endocrine: Negative for polydipsia and polyuria.   Genitourinary: Negative for difficulty urinating, dysuria, hematuria and menstrual problem.   Musculoskeletal: Positive for arthralgias. Negative for joint swelling and neck pain.   Neurological: Positive for headaches. Negative for weakness.   Psychiatric/Behavioral: Negative for confusion and dysphoric mood.       Objective:       Vitals:    07/10/18 1451   BP: 128/88   Pulse: 75   Temp: 98.4 °F (36.9 °C)   TempSrc: Oral   SpO2: 99%   Weight: 95.8 kg (211 lb 3.2 oz)   Height: 5' 2" (1.575 m)       Physical Exam   Constitutional: She is oriented to person, place, and time. She appears well-developed and well-nourished. No distress.   HENT:   Head: Normocephalic and atraumatic.   Neck: Normal range of motion. Neck supple.   Cardiovascular: Normal rate, regular rhythm and normal heart sounds.  Exam reveals no gallop and no friction rub.    No murmur heard.  Pulmonary/Chest: Effort normal and breath sounds normal. No respiratory distress. She has " no wheezes. She has no rales.   Musculoskeletal:        Lumbar back: She exhibits decreased range of motion, tenderness, pain and spasm. She exhibits no swelling, no edema and no deformity.        Back:    Neurological: She is alert and oriented to person, place, and time.   Skin: She is not diaphoretic.   Psychiatric: She has a normal mood and affect.       Assessment:       1. Arthropathy of lumbar facet joint    2. Mood disorder    3. Anxiety    4. Muscle spasm    5. Low back pain, non-specific    6. Left lumbar radiculitis        Plan:       Sarah was seen today for follow up/ renew medications and follow up/ left side pain.    Diagnoses and all orders for this visit:    Arthropathy of lumbar facet joint  -     MRI Lumbar Spine Without Contrast; Future  ORDERED MRI of the lumbar spine as her pain has worsened.     Mood disorder  -     desvenlafaxine succinate (PRISTIQ) 50 MG Tb24; Take 1 tablet (50 mg total) by mouth once daily.  Stable. Refilled meds.     Anxiety  -     ALPRAZolam (XANAX) 0.5 MG tablet; Take 1 tablet (0.5 mg total) by mouth 2 (two) times daily. as needed for anxiety.    Muscle spasm  -     tiZANidine (ZANAFLEX) 2 MG tablet; TAKE 1 TABLET BY MOUTH AT BEDTIME AS NEEDED FOR SPASM(S)    Low back pain, non-specific  -     MRI Lumbar Spine Without Contrast; Future    Left lumbar radiculitis  -     gabapentin (NEURONTIN) 300 MG capsule; Take 3 pills po daily and 1 pill po qhis  Refilled her neurontin for her pain.

## 2018-07-12 ENCOUNTER — HOSPITAL ENCOUNTER (OUTPATIENT)
Dept: RADIOLOGY | Facility: HOSPITAL | Age: 38
Discharge: HOME | End: 2018-07-12
Attending: FAMILY MEDICINE
Payer: COMMERCIAL

## 2018-07-12 ENCOUNTER — PATIENT MESSAGE (OUTPATIENT)
Dept: FAMILY MEDICINE | Facility: CLINIC | Age: 38
End: 2018-07-12

## 2018-07-12 DIAGNOSIS — M54.50 LOW BACK PAIN, NON-SPECIFIC: ICD-10-CM

## 2018-07-12 DIAGNOSIS — M47.816 ARTHROPATHY OF LUMBAR FACET JOINT: ICD-10-CM

## 2018-07-12 PROCEDURE — 72148 MRI LUMBAR SPINE W/O DYE: CPT | Mod: 26,,, | Performed by: RADIOLOGY

## 2018-07-12 PROCEDURE — 72148 MRI LUMBAR SPINE W/O DYE: CPT | Mod: TC

## 2018-07-18 ENCOUNTER — TELEPHONE (OUTPATIENT)
Dept: NEUROSURGERY | Facility: CLINIC | Age: 38
End: 2018-07-18

## 2018-07-18 DIAGNOSIS — R29.898 WEAKNESS OF BOTH LEGS: ICD-10-CM

## 2018-07-18 DIAGNOSIS — M79.604 LEG PAIN, BILATERAL: Primary | ICD-10-CM

## 2018-07-18 DIAGNOSIS — M79.605 LEG PAIN, BILATERAL: Primary | ICD-10-CM

## 2018-07-18 NOTE — TELEPHONE ENCOUNTER
----- Message from Keaton Abernathy sent at 7/18/2018  4:10 PM CDT -----  Needs Advice    Reason for call: Pt states injections and PT have not been working since seeing the doctor on 03/29/17, and she's requesting to see the doctor or PA again to discuss options for the back pain she's still experiencing.     Communication Preference: [facility] or [phone]  Additional Information:

## 2018-07-26 DIAGNOSIS — M62.838 MUSCLE SPASM: ICD-10-CM

## 2018-07-26 RX ORDER — TIZANIDINE 2 MG/1
TABLET ORAL
Qty: 45 TABLET | Refills: 1 | Status: SHIPPED | OUTPATIENT
Start: 2018-07-26 | End: 2018-10-01 | Stop reason: SDUPTHER

## 2018-07-30 DIAGNOSIS — M54.16 LEFT LUMBAR RADICULITIS: ICD-10-CM

## 2018-07-30 RX ORDER — GABAPENTIN 300 MG/1
900 CAPSULE ORAL NIGHTLY
Qty: 270 CAPSULE | Refills: 2 | Status: SHIPPED | OUTPATIENT
Start: 2018-07-30 | End: 2018-09-17

## 2018-09-05 RX ORDER — PANTOPRAZOLE SODIUM 40 MG/1
40 TABLET, DELAYED RELEASE ORAL DAILY
Qty: 30 TABLET | Refills: 5 | Status: SHIPPED | OUTPATIENT
Start: 2018-09-05 | End: 2018-09-25

## 2018-09-11 DIAGNOSIS — G47.9 SLEEP DISTURBANCE: ICD-10-CM

## 2018-09-11 RX ORDER — ZOLPIDEM TARTRATE 5 MG/1
TABLET ORAL
Qty: 30 TABLET | Status: CANCELLED | OUTPATIENT
Start: 2018-09-11

## 2018-09-17 ENCOUNTER — OFFICE VISIT (OUTPATIENT)
Dept: FAMILY MEDICINE | Facility: CLINIC | Age: 38
End: 2018-09-17
Payer: COMMERCIAL

## 2018-09-17 VITALS
TEMPERATURE: 98 F | DIASTOLIC BLOOD PRESSURE: 88 MMHG | HEART RATE: 74 BPM | BODY MASS INDEX: 39.76 KG/M2 | OXYGEN SATURATION: 98 % | WEIGHT: 216.06 LBS | HEIGHT: 62 IN | SYSTOLIC BLOOD PRESSURE: 136 MMHG

## 2018-09-17 DIAGNOSIS — F41.9 ANXIETY: ICD-10-CM

## 2018-09-17 DIAGNOSIS — M62.838 MUSCLE SPASM: ICD-10-CM

## 2018-09-17 DIAGNOSIS — G43.809 OTHER MIGRAINE WITHOUT STATUS MIGRAINOSUS, NOT INTRACTABLE: Primary | ICD-10-CM

## 2018-09-17 DIAGNOSIS — F39 MOOD DISORDER: ICD-10-CM

## 2018-09-17 DIAGNOSIS — G47.9 SLEEP DISTURBANCE: ICD-10-CM

## 2018-09-17 PROCEDURE — 99214 OFFICE O/P EST MOD 30 MIN: CPT | Mod: S$GLB,,, | Performed by: FAMILY MEDICINE

## 2018-09-17 PROCEDURE — 3008F BODY MASS INDEX DOCD: CPT | Mod: CPTII,S$GLB,, | Performed by: FAMILY MEDICINE

## 2018-09-17 PROCEDURE — 99999 PR PBB SHADOW E&M-EST. PATIENT-LVL III: CPT | Mod: PBBFAC,,, | Performed by: FAMILY MEDICINE

## 2018-09-17 RX ORDER — TOPIRAMATE 25 MG/1
1 TABLET ORAL
COMMUNITY
End: 2018-09-17 | Stop reason: SDUPTHER

## 2018-09-17 RX ORDER — TIZANIDINE 2 MG/1
TABLET ORAL
COMMUNITY
End: 2018-09-17 | Stop reason: SDUPTHER

## 2018-09-17 RX ORDER — TOPIRAMATE 100 MG/1
100 TABLET, FILM COATED ORAL NIGHTLY
Qty: 30 TABLET | Refills: 5 | Status: SHIPPED | OUTPATIENT
Start: 2018-09-17 | End: 2018-09-25

## 2018-09-17 RX ORDER — ALPRAZOLAM 0.5 MG/1
0.5 TABLET ORAL 2 TIMES DAILY
Qty: 60 TABLET | Refills: 2 | Status: SHIPPED | OUTPATIENT
Start: 2018-09-17 | End: 2019-03-11 | Stop reason: SDUPTHER

## 2018-09-17 RX ORDER — DESVENLAFAXINE SUCCINATE 50 MG/1
50 TABLET, EXTENDED RELEASE ORAL DAILY
Qty: 30 TABLET | Refills: 5 | Status: SHIPPED | OUTPATIENT
Start: 2018-09-17 | End: 2018-09-25

## 2018-09-17 RX ORDER — ZOLPIDEM TARTRATE 5 MG/1
5 TABLET ORAL NIGHTLY PRN
Qty: 30 TABLET | Refills: 5 | Status: SHIPPED | OUTPATIENT
Start: 2018-09-17 | End: 2018-09-25

## 2018-09-17 RX ORDER — SUMATRIPTAN SUCCINATE 25 MG/1
TABLET ORAL
COMMUNITY
End: 2018-09-25

## 2018-09-17 RX ORDER — TIZANIDINE 2 MG/1
TABLET ORAL
Qty: 45 TABLET | Refills: 1 | Status: SHIPPED | OUTPATIENT
Start: 2018-09-17 | End: 2020-03-05

## 2018-09-17 NOTE — PROGRESS NOTES
Subjective:       Patient ID: Sarah Mukherjee is a 38 y.o. female.    Chief Complaint: Medication Refill and Discuss Migraines/  Med Dose Changes    38 year old patient with depression, GERD, migraine headaches, DJD of lumbar spine, and neurogenic bladder is here with concerns. She is on Topamax for migraine headachse. She states the migraines are becoming more severe. She states she took an Imitrex and it didn't improve it as well as it typically would. She states she started taking OTC med's. She states she monitored her blood pressure as it was elevated, but it is improving. She had associated nausea. She does suffer with chronic back pain for which she receives injections. She states the back pain was causing sleep disturbance. She states her back doesn't hurt anymore, but she still has the pain in her left buttock and it radiates down the back of her leg.     She is also on pristiq for her depression and reports that her anxiety is well controlled with this. She uses xanax as need for break through anxiety and ambien for sleep.       Past Medical History:  No date: Cancer      Comment:  skin cancer removed  No date: Crohn's disease  No date: Degenerative joint disease of spine  No date: Esophagitis  No date: History of stomach ulcers  6/20/2014: Lumbar facet arthropathy  No date: Neurogenic bladder   Past Surgical History:  4/11/2017: BLOCK-NERVE-Selective NerveRoot; Bilateral      Comment:  Performed by Andra Burt MD at [facility]  No date: BREAST RECONSTRUCTION  No date: HYSTERECTOMY      Comment:  total hysterectomy in 20s due to fibroid, endometriosis  3/28/2017: INJECTION-STEROID-EPIDURAL-LUMBAR; N/A      Comment:  Performed by Andra Burt MD at [facility]  3/7/2017: INJECTION-STEROID-EPIDURAL-LUMBAR; N/A      Comment:  Performed by Andra uBrt MD at [facility]  No date: MASTECTOMY  No date: PELVIC LAPAROSCOPY  No date: TONSILLECTOMY  Review of patient's family history  indicates:  Problem: Diabetes      Relation: Mother          Age of Onset: (Not Specified)  Problem: Hypertension      Relation: Mother          Age of Onset: (Not Specified)  Problem: Breast cancer      Relation: Maternal Aunt          Age of Onset: 47  Problem: Ovarian cancer      Relation: Maternal Aunt          Age of Onset: (Not Specified)    Social History    Socioeconomic History      Marital status:       Spouse name: Not on file      Number of children: Not on file      Years of education: Not on file      Highest education level: Not on file    Social Needs      Financial resource strain: Not on file      Food insecurity - worry: Not on file      Food insecurity - inability: Not on file      Transportation needs - medical: Not on file      Transportation needs - non-medical: Not on file    Occupational History      Not on file    Tobacco Use      Smoking status: Never Smoker    Substance and Sexual Activity      Alcohol use: Yes        Comment: occ      Drug use: No      Sexual activity: Yes    Other Topics      Concerns:        Not on file    Social History Narrative      Not on file          Review of Systems   Constitutional: Negative for activity change and unexpected weight change.   HENT: Negative for hearing loss, rhinorrhea and trouble swallowing.    Eyes: Negative for discharge and visual disturbance.   Respiratory: Negative for chest tightness and wheezing.    Cardiovascular: Negative for chest pain and palpitations.   Gastrointestinal: Negative for blood in stool, constipation, diarrhea and vomiting.   Endocrine: Negative for polydipsia and polyuria.   Genitourinary: Negative for difficulty urinating, dysuria, hematuria and menstrual problem.   Musculoskeletal: Positive for arthralgias. Negative for joint swelling and neck pain.   Neurological: Positive for headaches. Negative for weakness.   Psychiatric/Behavioral: Negative for confusion and dysphoric mood.       Objective:       Vitals:  "   09/17/18 1142   BP: 136/88   Pulse: 74   Temp: 98.4 °F (36.9 °C)   TempSrc: Oral   SpO2: 98%   Weight: 98 kg (216 lb 0.8 oz)   Height: 5' 2" (1.575 m)       Physical Exam   Constitutional: She is oriented to person, place, and time. She appears well-developed and well-nourished. No distress.   HENT:   Head: Normocephalic and atraumatic.   Neck: Normal range of motion. Neck supple.   Cardiovascular: Normal rate, regular rhythm and normal heart sounds. Exam reveals no gallop and no friction rub.   No murmur heard.  Pulmonary/Chest: Effort normal and breath sounds normal. No stridor. No respiratory distress. She has no wheezes. She has no rales.   Neurological: She is alert and oriented to person, place, and time.   Skin: She is not diaphoretic.       Assessment:       1. Other migraine without status migrainosus, not intractable    2. Sleep disturbance    3. Muscle spasm    4. Anxiety    5. Mood disorder        Plan:       Sarah was seen today for medication refill and discuss migraines/  med dose changes.    Diagnoses and all orders for this visit:    Other migraine without status migrainosus, not intractable  -     topiramate (TOPAMAX) 100 MG tablet; Take 1 tablet (100 mg total) by mouth every evening.  Will increase topamax for headaches.    Sleep disturbance  -     zolpidem (AMBIEN) 5 MG Tab; Take 1 tablet (5 mg total) by mouth nightly as needed.  Stable. Refilled meds.     Muscle spasm  -     tiZANidine (ZANAFLEX) 2 MG tablet; TAKE 1 TABLET BY MOUTH AT BEDTIME AS NEEDED FOR SPASM(S)  Stable. Refilled meds.     Anxiety  -     ALPRAZolam (XANAX) 0.5 MG tablet; Take 1 tablet (0.5 mg total) by mouth 2 (two) times daily. as needed for anxiety.  Use as needed    Mood disorder  -     desvenlafaxine succinate (PRISTIQ) 50 MG Tb24; Take 1 tablet (50 mg total) by mouth once daily.  Patient doing better on pristiq so will continue this.          "

## 2018-09-21 ENCOUNTER — PROCEDURE VISIT (OUTPATIENT)
Dept: NEUROLOGY | Facility: CLINIC | Age: 38
End: 2018-09-21
Payer: COMMERCIAL

## 2018-09-21 DIAGNOSIS — M79.604 LEG PAIN, BILATERAL: ICD-10-CM

## 2018-09-21 DIAGNOSIS — R29.898 WEAKNESS OF BOTH LEGS: ICD-10-CM

## 2018-09-21 DIAGNOSIS — M79.605 LEG PAIN, BILATERAL: ICD-10-CM

## 2018-09-21 PROCEDURE — 95910 NRV CNDJ TEST 7-8 STUDIES: CPT | Mod: S$GLB,,, | Performed by: PSYCHIATRY & NEUROLOGY

## 2018-09-21 PROCEDURE — 95886 MUSC TEST DONE W/N TEST COMP: CPT | Mod: S$GLB,,, | Performed by: PSYCHIATRY & NEUROLOGY

## 2018-09-25 ENCOUNTER — OFFICE VISIT (OUTPATIENT)
Dept: NEUROSURGERY | Facility: CLINIC | Age: 38
End: 2018-09-25
Payer: COMMERCIAL

## 2018-09-25 ENCOUNTER — TELEPHONE (OUTPATIENT)
Dept: FAMILY MEDICINE | Facility: CLINIC | Age: 38
End: 2018-09-25

## 2018-09-25 ENCOUNTER — PATIENT MESSAGE (OUTPATIENT)
Dept: FAMILY MEDICINE | Facility: CLINIC | Age: 38
End: 2018-09-25

## 2018-09-25 VITALS
BODY MASS INDEX: 40.38 KG/M2 | HEART RATE: 75 BPM | WEIGHT: 220.81 LBS | SYSTOLIC BLOOD PRESSURE: 133 MMHG | DIASTOLIC BLOOD PRESSURE: 102 MMHG | TEMPERATURE: 99 F

## 2018-09-25 DIAGNOSIS — G43.809 OTHER MIGRAINE WITHOUT STATUS MIGRAINOSUS, NOT INTRACTABLE: Primary | ICD-10-CM

## 2018-09-25 DIAGNOSIS — M47.816 LUMBAR FACET ARTHROPATHY: ICD-10-CM

## 2018-09-25 DIAGNOSIS — G89.4 CHRONIC PAIN SYNDROME: Primary | ICD-10-CM

## 2018-09-25 PROCEDURE — 3008F BODY MASS INDEX DOCD: CPT | Mod: CPTII,S$GLB,, | Performed by: NEUROLOGICAL SURGERY

## 2018-09-25 PROCEDURE — 99999 PR PBB SHADOW E&M-EST. PATIENT-LVL III: CPT | Mod: PBBFAC,,, | Performed by: NEUROLOGICAL SURGERY

## 2018-09-25 PROCEDURE — 99214 OFFICE O/P EST MOD 30 MIN: CPT | Mod: S$GLB,,, | Performed by: NEUROLOGICAL SURGERY

## 2018-09-25 RX ORDER — TOPIRAMATE 25 MG/1
TABLET ORAL
COMMUNITY
End: 2018-10-01

## 2018-09-25 RX ORDER — BUTALBITAL, ACETAMINOPHEN AND CAFFEINE 50; 325; 40 MG/1; MG/1; MG/1
1 TABLET ORAL EVERY 6 HOURS PRN
Qty: 30 TABLET | Refills: 2 | Status: SHIPPED | OUTPATIENT
Start: 2018-09-25 | End: 2018-10-25

## 2018-09-25 RX ORDER — DICYCLOMINE HYDROCHLORIDE 10 MG/1
CAPSULE ORAL
COMMUNITY
End: 2018-10-03

## 2018-09-25 RX ORDER — SUMATRIPTAN SUCCINATE 25 MG/1
TABLET ORAL
COMMUNITY
End: 2019-03-11

## 2018-09-25 RX ORDER — POLYETHYLENE GLYCOL 3350, SODIUM SULFATE, SODIUM CHLORIDE, POTASSIUM CHLORIDE, SODIUM ASCORBATE, AND ASCORBIC ACID 7.5-2.691G
KIT ORAL
COMMUNITY
End: 2018-10-03

## 2018-09-25 RX ORDER — SERTRALINE HYDROCHLORIDE 50 MG/1
TABLET, FILM COATED ORAL
COMMUNITY
End: 2018-10-03

## 2018-09-25 RX ORDER — CHOLESTYRAMINE 4 G/4.8G
POWDER, FOR SUSPENSION ORAL
COMMUNITY
End: 2019-07-17

## 2018-09-25 RX ORDER — DESVENLAFAXINE SUCCINATE 50 MG/1
TABLET, EXTENDED RELEASE ORAL
COMMUNITY
End: 2019-03-11 | Stop reason: SDUPTHER

## 2018-09-25 RX ORDER — ZOLPIDEM TARTRATE 5 MG/1
TABLET ORAL
COMMUNITY
End: 2019-03-11

## 2018-09-25 RX ORDER — ONDANSETRON 4 MG/1
TABLET, FILM COATED ORAL
COMMUNITY
End: 2018-10-03

## 2018-09-25 RX ORDER — PANTOPRAZOLE SODIUM 40 MG/1
TABLET, DELAYED RELEASE ORAL
COMMUNITY
End: 2019-07-17

## 2018-09-25 NOTE — TELEPHONE ENCOUNTER
----- Message from Deborah Ureña sent at 9/25/2018 11:44 AM CDT -----  Contact: self [phone]  Pt is requesting to speak to you regarding her headache. Pt states that she had taken her medicine and still has a major headache. Pt said that was told by Dr. Sorenson that she can call anytime she get this headaches. Pls call pt [phone]. Thanks.......ADRIANNA

## 2018-09-25 NOTE — PROGRESS NOTES
Subjective:    I, Laure Dominguez, attest that this documentation has been prepared under the direction and in the presence of RAYNE Sullivan MD.     Patient ID: Sarah Mukherjee is a 38 y.o. female.    Chief Complaint: Follow-up    HPI   Pt is a 38 y.o. female who presents with history of back pain and left sided leg pain with uncertain etiology. MRI scan ws shows mild disc bulge at L5-S1. She has had multiple injections and radio oblation without significant relief. We have trid transforlaminal and PT with no change. EMG is also normal.     Review of Systems   Constitutional: Negative for chills, fatigue and fever.   HENT: Negative for sinus pressure and trouble swallowing.    Eyes: Negative.  Negative for visual disturbance.   Respiratory: Negative.    Cardiovascular: Negative.    Gastrointestinal: Negative.  Negative for nausea and vomiting.   Endocrine: Negative.    Genitourinary: Negative.    Musculoskeletal: Positive for back pain (Radiating to ilateral LE).   Neurological: Negative for dizziness, seizures, syncope, speech difficulty, weakness and numbness.       Objective:      Physical Exam:  Nursing note and vitals reviewed.    Constitutional: She appears well-developed.     Eyes: Pupils are equal, round, and reactive to light. Conjunctivae and EOM are normal.     Cardiovascular: Normal rate, regular rhythm, normal pulses and intact distal pulses.     Abdominal: Soft.     Psych/Behavior: She is alert. She is oriented to person, place, and time. She has a normal mood and affect.     Musculoskeletal: Gait is normal.        Neck: Range of motion is full. There is no tenderness. Muscle strength is 5/5. Tone is normal.        Back: Range of motion is full. There is no tenderness. Muscle strength is 5/5. Tone is normal.        Right Upper Extremities: Range of motion is full. There is no tenderness. Muscle strength is 5/5. Tone is normal.        Left Upper Extremities: Range of motion is full. There is no tenderness.  Muscle strength is 5/5. Tone is normal.       Right Lower Extremities: Range of motion is full. There is no tenderness. Muscle strength is 5/5. Tone is normal.        Left Lower Extremities: Range of motion is full. There is no tenderness. Muscle strength is 5/5. Tone is normal.     Neurological:        Coordination: She has a normal Romberg Test, normal finger to nose coordination, normal heel to shin coordination and normal tandem walking coordination.        DTRs: DTRs are normal. Tricep reflexes are 2+ on the right side and 2+ on the left side. Bicep reflexes are 2+ on the right side and 2+ on the left side. Brachioradialis reflexes are 2+ on the right side and 2+ on the left side. Patellar reflexes are 2+ on the right side and 2+ on the left side. Achilles reflexes are 2+ on the right side and 2+ on the left side.        Cranial nerves: Cranial nerve(s) II, III, IV, V, VI, VII, VIII, IX, X, XI and XII are intact.       Pt Neurologically non-focal.   Negative SLR.      Imaging:    MRI scan, dated 7/12/2018, shows mild disc bulge at L5-S1.    Assessment/Plan:   Pt with intractable back and leg pain. I dont really see anything operative to offer the pt. At this point possibly a reevaluation by pain management for consideration of sipinal cord stimulation may be the way to go.     I, RAYNE Sullivan MD, personally performed the services described in this documentation. All medical record entries made by the scribe, Laure Dominguez, were at my direction and in my presence.  I have reviewed the chart and agree that the record reflects my personal performance and is accurate and complete.

## 2018-09-25 NOTE — TELEPHONE ENCOUNTER
Patient requesting advice for pain relief of migraine headaches.  Stated Imitrex is not working, would like recommendations on what to do for pain relief.     Patient advised, per Dr. Sorenson, that a new script - Fioricet- will be sent to the pharmacy.  Patient verbalized understanding.

## 2018-09-25 NOTE — LETTER
October 1, 2018      Staci Sorenson MD  [address] [place] [address]           [facility] 7th Fl  [address]  [facility] [address]  Phone: [phone]          Patient: Sarah Mukherjee   MR Number: [id number]   YOB: 1980   Date of Visit: 9/25/2018       Dear Dr. Staci Sorenson:    Thank you for referring Sarah Mukherjee to me for evaluation. Attached you will find relevant portions of my assessment and plan of care.    If you have questions, please do not hesitate to call me. I look forward to following Sarah Mukherjee along with you.    Sincerely,    Hansel Moser  CC:  No Recipients    If you would like to receive this communication electronically, please contact [email or web address] or [phone] to request more information on [employer] Link access.    For providers and/or their staff who would like to refer a patient to [facility], please contact us through our one-stop-shop provider referral line, [facility], at [phone].    If you feel you have received this communication in error or would no longer like to receive these types of communications, please e-mail [email or web address]

## 2018-10-01 ENCOUNTER — OFFICE VISIT (OUTPATIENT)
Dept: FAMILY MEDICINE | Facility: CLINIC | Age: 38
End: 2018-10-01
Payer: COMMERCIAL

## 2018-10-01 VITALS
HEART RATE: 68 BPM | OXYGEN SATURATION: 98 % | WEIGHT: 215.81 LBS | HEIGHT: 62 IN | DIASTOLIC BLOOD PRESSURE: 74 MMHG | SYSTOLIC BLOOD PRESSURE: 126 MMHG | BODY MASS INDEX: 39.71 KG/M2

## 2018-10-01 DIAGNOSIS — G44.89 CHRONIC MIXED HEADACHE SYNDROME: ICD-10-CM

## 2018-10-01 DIAGNOSIS — G43.809 OTHER MIGRAINE WITHOUT STATUS MIGRAINOSUS, NOT INTRACTABLE: Primary | ICD-10-CM

## 2018-10-01 PROCEDURE — 3008F BODY MASS INDEX DOCD: CPT | Mod: CPTII,S$GLB,, | Performed by: FAMILY MEDICINE

## 2018-10-01 PROCEDURE — 99999 PR PBB SHADOW E&M-EST. PATIENT-LVL III: CPT | Mod: PBBFAC,,, | Performed by: FAMILY MEDICINE

## 2018-10-01 PROCEDURE — 96372 THER/PROPH/DIAG INJ SC/IM: CPT | Mod: S$GLB,,, | Performed by: FAMILY MEDICINE

## 2018-10-01 PROCEDURE — 99214 OFFICE O/P EST MOD 30 MIN: CPT | Mod: 25,S$GLB,, | Performed by: FAMILY MEDICINE

## 2018-10-01 RX ORDER — KETOROLAC TROMETHAMINE 30 MG/ML
30 INJECTION, SOLUTION INTRAMUSCULAR; INTRAVENOUS
Status: COMPLETED | OUTPATIENT
Start: 2018-10-01 | End: 2018-10-01

## 2018-10-01 RX ORDER — TOPIRAMATE 50 MG/1
50 TABLET, FILM COATED ORAL 2 TIMES DAILY
Qty: 60 TABLET | Refills: 5 | Status: SHIPPED | OUTPATIENT
Start: 2018-10-01 | End: 2018-10-01

## 2018-10-01 RX ADMIN — KETOROLAC TROMETHAMINE 30 MG: 30 INJECTION, SOLUTION INTRAMUSCULAR; INTRAVENOUS at 03:10

## 2018-10-01 NOTE — PROGRESS NOTES
Subjective:       Patient ID: Sarah Mukherjee is a 38 y.o. female.    Chief Complaint: Migraine    38 year old female with depression, GERD, migraines, DJD of lumbar pine and neurogenic bladder presents with worsening headaches. She has had severe pain with nausea and dry heaves. She states the pain was so bad she had difficulty getting out of bed. She also had another headaches yesterday, which was 6 days ago. We recently gave her fioricet and put a mask over her eyes and went to lay down. She states it returned again in the middle of the night. She states the only thing that is diifferent is iron tablets.       Past Medical History:  No date: Cancer      Comment:  skin cancer removed  No date: Crohn's disease  No date: Degenerative joint disease of spine  No date: Esophagitis  No date: History of stomach ulcers  6/20/2014: Lumbar facet arthropathy  No date: Neurogenic bladder   Past Surgical History:  4/11/2017: BLOCK-NERVE-Selective NerveRoot; Bilateral      Comment:  Performed by Andra Burt MD at [facility]  No date: BREAST RECONSTRUCTION  No date: HYSTERECTOMY      Comment:  total hysterectomy in 20s due to fibroid, endometriosis  3/28/2017: INJECTION-STEROID-EPIDURAL-LUMBAR; N/A      Comment:  Performed by Andra Burt MD at [facility]  3/7/2017: INJECTION-STEROID-EPIDURAL-LUMBAR; N/A      Comment:  Performed by Andra Burt MD at [facility]  No date: MASTECTOMY  No date: PELVIC LAPAROSCOPY  No date: TONSILLECTOMY  Review of patient's family history indicates:  Problem: Diabetes      Relation: Mother          Age of Onset: (Not Specified)  Problem: Hypertension      Relation: Mother          Age of Onset: (Not Specified)  Problem: Breast cancer      Relation: Maternal Aunt          Age of Onset: 47  Problem: Ovarian cancer      Relation: Maternal Aunt          Age of Onset: (Not Specified)    Social History    Socioeconomic History      Marital status:       Spouse name: Not on  "file      Number of children: Not on file      Years of education: Not on file      Highest education level: Not on file    Social Needs      Financial resource strain: Not on file      Food insecurity - worry: Not on file      Food insecurity - inability: Not on file      Transportation needs - medical: Not on file      Transportation needs - non-medical: Not on file    Occupational History      Not on file    Tobacco Use      Smoking status: Never Smoker      Smokeless tobacco: Never Used    Substance and Sexual Activity      Alcohol use: Yes        Comment: occ      Drug use: No      Sexual activity: Yes    Other Topics      Concerns:        Not on file    Social History Narrative      Not on file          Review of Systems   Constitutional: Negative for activity change and unexpected weight change.   HENT: Negative for hearing loss, rhinorrhea and trouble swallowing.    Eyes: Negative for discharge and visual disturbance.   Respiratory: Negative for chest tightness and wheezing.    Cardiovascular: Negative for chest pain and palpitations.   Gastrointestinal: Negative for blood in stool, constipation, diarrhea and vomiting.   Endocrine: Negative for polydipsia and polyuria.   Genitourinary: Negative for difficulty urinating, dysuria, hematuria and menstrual problem.   Musculoskeletal: Negative for arthralgias, joint swelling and neck pain.   Neurological: Positive for headaches.   Psychiatric/Behavioral: Negative for dysphoric mood.       Objective:       Vitals:    10/01/18 1511 10/01/18 1539   BP: 130/84 126/74   Pulse: 68    SpO2: 98%    Weight: 97.9 kg (215 lb 13.3 oz)    Height: 5' 2" (1.575 m)        Physical Exam   Constitutional: She is oriented to person, place, and time. She appears well-developed and well-nourished. No distress.   HENT:   Head: Normocephalic and atraumatic.   Eyes: Conjunctivae are normal.   Neck: Normal range of motion. Neck supple. Carotid bruit is not present.   Cardiovascular: " Normal rate, regular rhythm and normal heart sounds. Exam reveals no gallop and no friction rub.   No murmur heard.  Pulmonary/Chest: Effort normal and breath sounds normal. No respiratory distress. She has no wheezes. She has no rales.   Musculoskeletal: She exhibits no edema.   Neurological: She is alert and oriented to person, place, and time.   Skin: She is not diaphoretic.       Assessment:       1. Other migraine without status migrainosus, not intractable    2. Chronic mixed headache syndrome        Plan:       Sarah was seen today for migraine.    Diagnoses and all orders for this visit:    Other migraine without status migrainosus, not intractable  -     ketorolac injection 30 mg; Inject 1 mL (30 mg total) into the muscle one time.  -     Prior Authorization Order  -     MRI Brain W WO Contrast; Future  Ordered toradol injection. Ordered MRI of the brain     Chronic mixed headache syndrome  -     MRI Brain W WO Contrast; Future

## 2018-10-03 ENCOUNTER — PATIENT MESSAGE (OUTPATIENT)
Dept: FAMILY MEDICINE | Facility: CLINIC | Age: 38
End: 2018-10-03

## 2018-10-03 ENCOUNTER — OFFICE VISIT (OUTPATIENT)
Dept: PAIN MEDICINE | Facility: CLINIC | Age: 38
End: 2018-10-03
Attending: NEUROLOGICAL SURGERY
Payer: COMMERCIAL

## 2018-10-03 VITALS
OXYGEN SATURATION: 100 % | HEART RATE: 74 BPM | TEMPERATURE: 98 F | DIASTOLIC BLOOD PRESSURE: 98 MMHG | HEIGHT: 62 IN | SYSTOLIC BLOOD PRESSURE: 143 MMHG | RESPIRATION RATE: 18 BRPM | WEIGHT: 216.06 LBS | BODY MASS INDEX: 39.76 KG/M2

## 2018-10-03 DIAGNOSIS — M47.816 LUMBAR SPONDYLOSIS: ICD-10-CM

## 2018-10-03 DIAGNOSIS — M54.16 LEFT LUMBAR RADICULITIS: ICD-10-CM

## 2018-10-03 DIAGNOSIS — G89.4 CHRONIC PAIN DISORDER: Primary | ICD-10-CM

## 2018-10-03 DIAGNOSIS — M51.36 DDD (DEGENERATIVE DISC DISEASE), LUMBAR: ICD-10-CM

## 2018-10-03 PROCEDURE — 99214 OFFICE O/P EST MOD 30 MIN: CPT | Mod: S$GLB,,, | Performed by: ANESTHESIOLOGY

## 2018-10-03 PROCEDURE — 3008F BODY MASS INDEX DOCD: CPT | Mod: CPTII,S$GLB,, | Performed by: ANESTHESIOLOGY

## 2018-10-03 PROCEDURE — 99999 PR PBB SHADOW E&M-EST. PATIENT-LVL IV: CPT | Mod: PBBFAC,,, | Performed by: ANESTHESIOLOGY

## 2018-10-03 RX ORDER — LORAZEPAM 1 MG/1
1 TABLET ORAL ONCE
Qty: 1 TABLET | Refills: 0 | Status: SHIPPED | OUTPATIENT
Start: 2018-10-03 | End: 2019-03-11

## 2018-10-03 RX ORDER — ETODOLAC 400 MG/1
400 TABLET, FILM COATED ORAL 2 TIMES DAILY
Qty: 30 TABLET | Refills: 3 | Status: SHIPPED | OUTPATIENT
Start: 2018-10-03 | End: 2018-11-27 | Stop reason: SDUPTHER

## 2018-10-03 NOTE — PATIENT INSTRUCTIONS
I have recommended a chronic pain program here at [facility].  We have two:    1. Our Healthy Back Program: this is an outpatient program that is twice a week for 10 weeks.  This focuses on chronic pain and how this affects your movement and lifestyle    2. Our functional restoration program.  This is an intensive outpatient program that is every day for 3 weeks.  This is a whole body, personalized program that addresses how pain affects all aspects of your life, including activities, sleep, relationships, nutrition, and more.      Both of these come with a year wellness program after.      Recommend taking etodolac 400 mg up to twice daily as needed.  Take medication with meals.  If you experience any upset stomach, nausea, or vomiting, stop taking this medication.

## 2018-10-03 NOTE — LETTER
October 5, 2018      Fadi Sullivan MD  [address]  [facility] [address]           [facility]  [address]  [place] [address]  Phone: [phone]  Fax: [phone]          Patient: Sarah Mukherjee   MR Number: [id number]   YOB: 1980   Date of Visit: 10/3/2018       Dear Dr. Fadi Sullivan:    Thank you for referring Sarah Mukherjee to me for evaluation. Attached you will find relevant portions of my assessment and plan of care.    If you have questions, please do not hesitate to call me. I look forward to following Sarah Mukherjee along with you.    Sincerely,    Andra Burt MD    Enclosure  CC:  No Recipients    If you would like to receive this communication electronically, please contact [email or web address] or [phone] to request more information on [employer] access.    For providers and/or their staff who would like to refer a patient to [facility], please contact us through our one-stop-shop provider referral line, [facility], at [phone].    If you feel you have received this communication in error or would no longer like to receive these types of communications, please e-mail [email or web address]

## 2018-10-03 NOTE — PROGRESS NOTES
Subjective:      Patient ID: Sarah Mukherjee is a 38 y.o. female.    Chief Complaint: No chief complaint on file.    Referred by: Fadi Sullivan MD     HPI:    Mrs. Mukherjee is a 34-year-old female who presents today with Low back pain.  Her medical history is significant for depression, anxiety, neurogenic bladder and endometriosis, status post total hysterectomy. She reports her back pain is present about 2 and half years.  She has been seeing Dr. Matt for her pain.  He has had multiple injections for her all with limited benefit.  These have included radiofrequency ablations and epidurals.  Her most recent was March 11 of this year.  This procedure actually worsened her pain. Of note, she reports multiple life stressors, including the fact that she has had a hysterectomy which means that she her  cannot have children.   Her pain is described in detail below.    Interval History (1/26/2017):  She returns today for follow up.  She reports that she was doing better until 2 months ago when she start having a new onset of left lower extremity radiating pain.  The pain originates in her low back and radiates down the posterolateral aspect of her left leg to the anterior shin.  This began after she was hunting and sitting for a prolonged period of time in a deer stand.  She had one what sounds like a Left L4 TFESI by Dr. Mike Stewart that caused her pain to be worse.  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    Interval Hisotyr (3/22/2017):  She returns to clinic today following recent L4-5 IL STEWART on 3/7/17 without continued relief of symptoms.  States that she received about 2 days worth of significant relief, but her symptoms returned sonn thereafter.  Feels like they are possibly worse than prior to STEWART.  Describes symptoms in same way - constant left-sided lumbosacral pain that radiates down the posterior aspect into her knee.  Radiating pain less frequent than the  constant back pain but can be more severe at times.  Not currently doing any stretching or strengthening exercises.  Taking Gabapentin and Tizanidine at night.      Interval History (10/3/2018):  She returns today for follow up.  She reports that the injections helped somewhat for 4-6 weeks.  After that, she started physical therapy after that, which worsening her pain.  She then had a few falls in which she felt that her leg gave out.  Each fall caused worsening pain. Tizanidine 4 mg at night has been helpful for the pain, but she is not taking anything during the day.    Of note, she had an injection with Dr. Mccormack in the past year.  She thinks that this was a radiofrequency ablation.  This did not provide any relief.  She then had an SI joint injection.     Physical Therapy: The extended course of physical therapy in 2013  This was helpful while she was going.  She did her home exercise program until a recent bladder infection.  Since that time she's not done her HEP    Non-pharmacologic Treatment: Rest and heat make her pain better.         · TENS? Not tried    Pain Medications:         · Currently taking: Tizanidine 4 mg QHS      · Has tried in the past:  Gabapentin 900 mg QHS, Hydrocodone/acetaminophen was not helpful.  She recently tried gabapentin 100 mg daily with no benefit.  Relafen BID.    · Has not tried: TCAs, SNRIs, cream    Blood thinners: None    Interventional Therapies:   · 3/7/17 L4-5 IL STEWART  · Multiple epidural steroid injections and radiofrequency ablation.  · Left L4 TFESI: worsening pain  · ? RFA with Dr. Martinez:   · ? SI joint injection: No benefit    Relevant Surgeries: She has had a hysterectomy    Affecting sleep? Yes    Affecting daily activities? Yes    Depressive symptoms? Yes          · SI/HI? No    Work status: She is disabled    Last 3 PDI Scores 10/3/2018 3/22/2017 2/24/2017   Pain Disability Index (PDI) 19.5 50 50       Pain Scales  Best: 7/10  Worst: 10/10  Usually:  8/10  Today: 9/10    Low-back Pain   This is a chronic problem. The current episode started more than 1 year ago. The problem occurs constantly. The problem has been gradually worsening since onset. The pain is present in the lumbar spine. The pain does not radiate. Quality: Sharp and Deep. The pain is at a severity of 9/10. The pain is moderate. The pain is the same all the time. The symptoms are aggravated by standing and bending (Walking and Getting out of bed or chair). Stiffness is present at night and all day. Associated symptoms include leg pain. Pertinent negatives include no chest pain, fever, headaches or weight loss. She has tried injection treatment (Medication and Laying Down) for the symptoms. The treatment provided no relief.     Review of Systems   Constitution: Negative for chills, fever, malaise/fatigue, weight gain and weight loss.   HENT: Negative for ear pain and hoarse voice.    Eyes: Negative for blurred vision, pain and visual disturbance.   Cardiovascular: Negative for chest pain, dyspnea on exertion and irregular heartbeat.   Respiratory: Negative for cough, shortness of breath and wheezing.    Endocrine: Negative for cold intolerance and heat intolerance.   Hematologic/Lymphatic: Negative for adenopathy and bleeding problem. Does not bruise/bleed easily.   Skin: Negative for color change, itching and rash.   Musculoskeletal: Positive for back pain.   Gastrointestinal: Negative for change in bowel habit, diarrhea, hematemesis, hematochezia, melena and vomiting.   Genitourinary: Negative for flank pain, frequency, hematuria and urgency.   Neurological: Negative for difficulty with concentration, dizziness, headaches, loss of balance and seizures.   Psychiatric/Behavioral: Positive for depression. Negative for altered mental status and suicidal ideas. The patient is nervous/anxious.    Allergic/Immunologic: Negative for HIV exposure.   All other systems reviewed and are negative.      Past  Medical History:   Diagnosis Date    Cancer     skin cancer removed    Crohn's disease     Degenerative joint disease of spine     Esophagitis     History of stomach ulcers     Lumbar facet arthropathy 6/20/2014    Neurogenic bladder        Past Surgical History:   Procedure Laterality Date    BLOCK-NERVE-Selective NerveRoot Bilateral 4/11/2017    [facility] by [facility] at [facility]    BREAST RECONSTRUCTION      HYSTERECTOMY      total hysterectomy in 20s due to fibroid, endometriosis    INJECTION-STEROID-EPIDURAL-LUMBAR N/A 3/28/2017    [facility] at [facility]    INJECTION-STEROID-EPIDURAL-LUMBAR N/A 3/7/2017    [facility] by [facility] at [facility]    MASTECTOMY      PELVIC LAPAROSCOPY      TONSILLECTOMY         Review of patient's allergies indicates:   Allergen Reactions    Penicillins Hives       Current Outpatient Medications   Medication Sig Dispense Refill    ALPRAZolam (XANAX) 0.5 MG tablet Take 1 tablet (0.5 mg total) by mouth 2 (two) times daily. as needed for anxiety. 60 tablet 2    butalbital-acetaminophen-caffeine -40 mg (FIORICET, ESGIC) -40 mg per tablet Take 1 tablet by mouth every 6 (six) hours as needed for Pain. 30 tablet 2    cholestyramine-aspartame (PREVALITE) 4 gram PwPk Prevalite 4 gram powder for susp in a packet      desvenlafaxine succinate (PRISTIQ) 50 MG Tb24 desvenlafaxine succinate ER 50 mg tablet,extended release 24 hr   TAKE 1 TABLET (50 MG TOTAL) BY MOUTH ONCE DAILY.      dicyclomine (BENTYL) 10 MG capsule dicyclomine 10 mg capsule      ondansetron (ZOFRAN) 4 MG tablet ondansetron HCl 4 mg tablet      pantoprazole (PROTONIX) 40 MG tablet pantoprazole 40 mg tablet,delayed release   TAKE 1 TABLET (40 MG TOTAL) BY MOUTH ONCE DAILY.      polyethylene glycol (MOVIPREP) 100-7.5-2.691 gram solution MoviPrep 100 gram-7.5 gram-2.691 gram oral powder packet      sertraline (ZOLOFT) 50 MG tablet  "sertraline 50 mg tablet      sumatriptan (IMITREX) 25 MG Tab sumatriptan 25 mg tablet   TAKE 1 TABLET (25 MG TOTAL) BY MOUTH EVERY 2 (TWO) HOURS AS NEEDED.      tiZANidine (ZANAFLEX) 2 MG tablet TAKE 1 TABLET BY MOUTH AT BEDTIME AS NEEDED FOR SPASM(S) 45 tablet 1    zolpidem (AMBIEN) 5 MG Tab zolpidem 5 mg tablet   TAKE 1 TABLET BY MOUTH AT BEDTIME       No current facility-administered medications for this visit.        Family History   Problem Relation Age of Onset    Diabetes Mother     Hypertension Mother     Breast cancer Maternal Aunt 47    Ovarian cancer Maternal Aunt        Social History     Socioeconomic History    Marital status:      Spouse name: Not on file    Number of children: Not on file    Years of education: Not on file    Highest education level: Not on file   Social Needs    Financial resource strain: Not on file    Food insecurity - worry: Not on file    Food insecurity - inability: Not on file    Transportation needs - medical: Not on file    Transportation needs - non-medical: Not on file   Occupational History    Not on file   Tobacco Use    Smoking status: Never Smoker    Smokeless tobacco: Never Used   Substance and Sexual Activity    Alcohol use: Yes     Comment: occ    Drug use: No    Sexual activity: Yes   Other Topics Concern    Not on file   Social History Narrative    Not on file           Objective:      Vitals:    10/03/18 0728   BP: (!) 143/98   Pulse: 74   Resp: 18   Temp: 98.3 °F (36.8 °C)   TempSrc: Oral   SpO2: 100%   Weight: 98 kg (216 lb 0.8 oz)   Height: 5' 2" (1.575 m)   PainSc:   4   PainLoc: Back       Ortho/SPM Exam  GEN:  Well developed, well nourished.  No acute distress. No pain behavior.  HEENT:  No trauma.  Mucous membranes moist.  Nares patent bilaterally.  PSYCH: Normal affect. Thought content appropriate.  CHEST:  Breathing symmetric.  No audible wheezing.  ABD: Soft, non-tender, non-distended.  SKIN:  Warm, pink, dry.  No rash on " exposed areas.    EXT:  No cyanosis, clubbing, or edema.  No color change or changes in nail or hair growth.  NEURO/MUSCULOSKELETAL:  Fully alert, oriented, and appropriate. Speech normal belia. No cranial nerve deficits.   Gait: Normal.  Present trendelenburg sign bilaterally.   Motor Strength: 5/5 motor strength throughout lower extremities.   Sensory: No sensory deficit in the lower extremities.   Reflexes:  2+ and symmetric throughout.  Downgoing Babinski's bilaterally.  No clonus or spasticity.  L-Spine:  Decreased ROM with pain on Extension. Negative facet loading bilaterally.  Negative SLR left.    SI Joint/Hip: Negative CHANDNI bilaterally.  Negative FADIR bilaterally.  TTP over left>right lumbar paraspinals.  TTP over left SI joint.  No TTP over hips, piriformis muscles, or GTB.        Imaging:      Result Narrative       MRI lumbar spine without contrast.    Comparison: None.    Technique: Sagittal T1, T2, stir and axial T2 and T1-weighted images were obtained. The patient received 20 cc of Omniscan IV contrast .    Results: The alignment of the lumbar spine is normal. Vertebral body heights and disk spaces are relatively well maintained. No evidence of malignant bone marrow replacement process or infection. The conus medullaris terminates in good position. Very  mild disk desiccation seen at L4-5 with very minimal disk bulge and small left foraminal disk protrusion. No there is no central canal or foraminal stenosis is seen at any level. There is mild facet joint degenerative changes at L5 S1. The paraspinal  soft tissues appear normal. Following the administration of IV gadolinium contrast, no areas of abnormal enhancement seen to suggest an inflammatory, infectious or tumor process.          Result Impression       Subtle left foraminal disk protrusion at L4-L5 causing no significant mass effect on the exiting nerve root. There is no severe central canal or foraminal stenosis.         Assessment:        Encounter Diagnoses   Name Primary?    Chronic pain disorder Yes    DDD (degenerative disc disease), lumbar     Left lumbar radiculitis     Lumbar spondylosis          Plan:       Sarah was seen today for low-back pain.    Diagnoses and all orders for this visit:    Chronic pain disorder  -     etodolac (LODINE) 400 MG tablet; Take 1 tablet (400 mg total) by mouth 2 (two) times daily.    DDD (degenerative disc disease), lumbar  -     etodolac (LODINE) 400 MG tablet; Take 1 tablet (400 mg total) by mouth 2 (two) times daily.    Left lumbar radiculitis  -     etodolac (LODINE) 400 MG tablet; Take 1 tablet (400 mg total) by mouth 2 (two) times daily.    Lumbar spondylosis  -     etodolac (LODINE) 400 MG tablet; Take 1 tablet (400 mg total) by mouth 2 (two) times daily.      From her first visit: She has a history of chronic pain following GYN surgery with irritative bladder.  In addition to this, she has a history of multiple life stressors.  Her exam is essentially benign with each of the above diagnoses the relatively minor.  Her MRI is essentially normal.  I suspect that there may be a large underlying central component to her pain.  Because of this, I would favor starting with conservative treatment regiment.  I recommend holding off on interventional procedures for now.  I do not feel that she is a candidate for spinal cord termination at this time.  Of note, on her physical exam today her core strength was quite weak.     Assessment from 3/22/2017: Today, her central sensitization is under much better control than when I saw her in the past.  Today, she has focal physical exam findings and symptoms of a left L4 radiculitis.     10/3/2018: Today, she presents with a different left sided low back and leg pain that is not the same as before.    I discussed the treatment options with her today, including risks, benefits, and alternatives. All available images were reviewed. The patient is aware of the risks  and benefits of the medications being prescribed, common side effects, and proper usage.    1. ASHLEIGH from Dr. Mccormack.  Her symptoms are most consistent with a possible S1 distribution.  She does have TTP over the SI joint.  Overall, her exam findings do not show clear etiology for her pain.    2. Continue Gabapentin and Relafen.  Can increase Tizanadine to 2 pills at night prn for pain.  3. Counseled patient about the mechanism of action of Relafen, and instructed her to not take other OTC NSAIDs like Aleve or Advil in addition to Relafen.  4. Counseled patient on investigating online resources related to psychological treatment of chronic pain, including modalities such as CBT, biofeedback techniques, meditation, and relaxation.  5. I have stressed the importance of physical activity and a home exercise plan to help with pain and improve overall health.  6. RTC in 3 weeks following STEWART.     Greater than 30 minutes spent in total in todays visit with the patient, with more than half that time direct face to face counseling and education with the patient today. We discussed the disease process, prognosis, treatment plan, and risks and benefits.    I have seen the patient with the resident physician.  I have performed my own history and physical exam and we have come up with the above plan.  The patient is in agreement with our plan.    Andra Burt MD  10/3/2018    The above plan and management options were discussed at length with patient. Patient is in agreement with the above and verbalized understanding. It will be communicated with the consulting physician via electronic record, fax, or mail

## 2018-10-10 ENCOUNTER — HOSPITAL ENCOUNTER (OUTPATIENT)
Dept: RADIOLOGY | Facility: HOSPITAL | Age: 38
Discharge: HOME | End: 2018-10-10
Attending: FAMILY MEDICINE
Payer: COMMERCIAL

## 2018-10-10 DIAGNOSIS — G43.809 OTHER MIGRAINE WITHOUT STATUS MIGRAINOSUS, NOT INTRACTABLE: ICD-10-CM

## 2018-10-10 DIAGNOSIS — G44.89 CHRONIC MIXED HEADACHE SYNDROME: ICD-10-CM

## 2018-10-10 PROCEDURE — 70553 MRI BRAIN STEM W/O & W/DYE: CPT | Mod: 26,,, | Performed by: RADIOLOGY

## 2018-10-10 PROCEDURE — 25500020 PHARM REV CODE 255: Performed by: FAMILY MEDICINE

## 2018-10-10 PROCEDURE — A9585 GADOBUTROL INJECTION: HCPCS | Performed by: FAMILY MEDICINE

## 2018-10-10 PROCEDURE — 70553 MRI BRAIN STEM W/O & W/DYE: CPT | Mod: TC

## 2018-10-10 RX ORDER — GADOBUTROL 604.72 MG/ML
10 INJECTION INTRAVENOUS
Status: COMPLETED | OUTPATIENT
Start: 2018-10-10 | End: 2018-10-10

## 2018-10-10 RX ADMIN — GADOBUTROL 10 ML: 604.72 INJECTION INTRAVENOUS at 08:10

## 2018-10-14 DIAGNOSIS — M62.838 MUSCLE SPASM: ICD-10-CM

## 2018-10-22 RX ORDER — TIZANIDINE 2 MG/1
TABLET ORAL
Qty: 45 TABLET | Refills: 1 | Status: SHIPPED | OUTPATIENT
Start: 2018-10-22 | End: 2019-06-16 | Stop reason: SDUPTHER

## 2018-11-27 DIAGNOSIS — G89.4 CHRONIC PAIN DISORDER: ICD-10-CM

## 2018-11-27 DIAGNOSIS — M47.816 LUMBAR SPONDYLOSIS: ICD-10-CM

## 2018-11-27 DIAGNOSIS — M54.16 LEFT LUMBAR RADICULITIS: ICD-10-CM

## 2018-11-27 DIAGNOSIS — M51.36 DDD (DEGENERATIVE DISC DISEASE), LUMBAR: ICD-10-CM

## 2018-11-27 RX ORDER — ETODOLAC 400 MG/1
TABLET, FILM COATED ORAL
Qty: 60 TABLET | Refills: 5 | Status: SHIPPED | OUTPATIENT
Start: 2018-11-27 | End: 2019-06-17 | Stop reason: SDUPTHER

## 2018-12-20 ENCOUNTER — OFFICE VISIT (OUTPATIENT)
Dept: PAIN MEDICINE | Facility: CLINIC | Age: 38
End: 2018-12-20
Attending: ANESTHESIOLOGY
Payer: COMMERCIAL

## 2018-12-20 VITALS — BODY MASS INDEX: 39.34 KG/M2 | TEMPERATURE: 98 F | WEIGHT: 213.81 LBS | RESPIRATION RATE: 18 BRPM | HEIGHT: 62 IN

## 2018-12-20 DIAGNOSIS — M47.817 SPONDYLOSIS OF LUMBOSACRAL REGION WITHOUT MYELOPATHY OR RADICULOPATHY: Primary | ICD-10-CM

## 2018-12-20 DIAGNOSIS — R51.9 CHRONIC NONINTRACTABLE HEADACHE, UNSPECIFIED HEADACHE TYPE: ICD-10-CM

## 2018-12-20 DIAGNOSIS — G89.29 CHRONIC NONINTRACTABLE HEADACHE, UNSPECIFIED HEADACHE TYPE: ICD-10-CM

## 2018-12-20 DIAGNOSIS — M47.817 SPONDYLOSIS OF LUMBOSACRAL REGION WITHOUT MYELOPATHY OR RADICULOPATHY: ICD-10-CM

## 2018-12-20 DIAGNOSIS — G89.4 CHRONIC PAIN DISORDER: Primary | ICD-10-CM

## 2018-12-20 DIAGNOSIS — R03.0 BLOOD PRESSURE ELEVATED WITHOUT HISTORY OF HTN: ICD-10-CM

## 2018-12-20 PROCEDURE — 3008F BODY MASS INDEX DOCD: CPT | Mod: CPTII,S$GLB,, | Performed by: ANESTHESIOLOGY

## 2018-12-20 PROCEDURE — 99214 OFFICE O/P EST MOD 30 MIN: CPT | Mod: S$GLB,,, | Performed by: ANESTHESIOLOGY

## 2018-12-20 PROCEDURE — 99999 PR PBB SHADOW E&M-EST. PATIENT-LVL III: CPT | Mod: PBBFAC,,, | Performed by: ANESTHESIOLOGY

## 2018-12-20 RX ORDER — TOPIRAMATE 50 MG/1
50 TABLET, FILM COATED ORAL 2 TIMES DAILY
Refills: 5 | COMMUNITY
Start: 2018-10-29 | End: 2019-03-11

## 2018-12-20 RX ORDER — TOPIRAMATE 100 MG/1
TABLET, FILM COATED ORAL
Refills: 5 | COMMUNITY
Start: 2018-12-13 | End: 2019-03-11 | Stop reason: SDUPTHER

## 2018-12-20 RX ORDER — GABAPENTIN 300 MG/1
CAPSULE ORAL
Refills: 2 | COMMUNITY
Start: 2018-12-15 | End: 2019-07-17

## 2018-12-20 NOTE — PROGRESS NOTES
Subjective:      Patient ID: Sarah Mukherjee is a 38 y.o. female.    Chief Complaint: Low-back Pain    Referred by: No ref. provider found     HPI:    Mrs. Mukherjee is a 34-year-old female who presents today with Low back pain.  Her medical history is significant for depression, anxiety, neurogenic bladder and endometriosis, status post total hysterectomy. She reports her back pain is present about 2 and half years.  She has been seeing Dr. Matt for her pain.  He has had multiple injections for her all with limited benefit.  These have included radiofrequency ablations and epidurals.  Her most recent was March 11 of this year.  This procedure actually worsened her pain. Of note, she reports multiple life stressors, including the fact that she has had a hysterectomy which means that she her  cannot have children.   Her pain is described in detail below.    Interval History (1/26/2017):  She returns today for follow up.  She reports that she was doing better until 2 months ago when she start having a new onset of left lower extremity radiating pain.  The pain originates in her low back and radiates down the posterolateral aspect of her left leg to the anterior shin.  This began after she was hunting and sitting for a prolonged period of time in a deer stand.  She had one what sounds like a Left L4 TFESI by Dr. Mike Stewart that caused her pain to be worse.  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    Interval Hisotyr (3/22/2017):  She returns to clinic today following recent L4-5 IL STEWART on 3/7/17 without continued relief of symptoms.  States that she received about 2 days worth of significant relief, but her symptoms returned soon thereafter.  Feels like they are possibly worse than prior to STEWART.  Describes symptoms in same way - constant left-sided lumbosacral pain that radiates down the posterior aspect into her knee.  Radiating pain less frequent than the constant back  pain but can be more severe at times.  Not currently doing any stretching or strengthening exercises.  Taking Gabapentin and Tizanidine at night.      Interval History (10/3/2018):  She returns today for follow up.  She reports that the injections helped somewhat for 4-6 weeks.  After that, she started physical therapy after that, which worsening her pain.  She then had a few falls in which she felt that her leg gave out.  Each fall caused worsening pain. Tizanidine 4 mg at night has been helpful for the pain, but she is not taking anything during the day.    Of note, she had an injection with Dr. Mccormack in the past year.  She thinks that this was a radiofrequency ablation.  This did not provide any relief.  She then had an SI joint injection.     Interval History (12/20/2018):  She returns today for follow up.  She reports that her pain is the same.  Of note, she is reporting worsening migraines.  She reports that these happened when her pain is bad.  She has noticed that they have been getting worse lately.  Of note, she also notes that her blood pressure has been of lately.  Today, it is 153/108.  She reports that she was recently given Fioricet and that she took 2 of them this morning.    Physical Therapy: The extended course of physical therapy in 2013  This was helpful while she was going.  She did her home exercise program until a recent bladder infection.  Since that time she's not done her HEP    Non-pharmacologic Treatment: Rest and heat make her pain better.         · TENS? Not tried    Pain Medications:         · Currently taking: Tizanidine 4 mg QHS, etodolac 4 mg twice daily, Topamax, Ambien, Xanax, Imitrex, Fioricet    · Has tried in the past:  Gabapentin 900 mg QHS, Hydrocodone/acetaminophen was not helpful.  She recently tried gabapentin 100 mg daily with no benefit.  Relafen BID.    · Has not tried: TCAs, SNRIs, cream    Blood thinners: None    Interventional Therapies:   · 3/7/17 L4-5 IL  STEWART  · Multiple epidural steroid injections and radiofrequency ablation.  · Left L4 TFESI: worsening pain  · ? RFA with Dr. Martinez:   · ? SI joint injection: No benefit  · Left L2-5 RFA 2014  · Left SI joint injection 2014    Relevant Surgeries: She has had a hysterectomy    Affecting sleep? Yes    Affecting daily activities? Yes    Depressive symptoms? Yes          · SI/HI? No    Work status: She is disabled    Last 3 PDI Scores 12/20/2018 10/3/2018 3/22/2017   Pain Disability Index (PDI) 29 19.5 50       Pain Scales  Best: 5/10  Worst: 10/10  Usually: 5/10  Today: 5/10    Low-back Pain   This is a chronic problem. The current episode started more than 1 year ago. The problem occurs constantly. The problem has been gradually worsening since onset. The pain is present in the lumbar spine. The pain does not radiate. Quality: Sharp and Deep. The pain is at a severity of 9/10. The pain is moderate. The pain is the same all the time. The symptoms are aggravated by standing and bending (Walking and Getting out of bed or chair). Stiffness is present at night and all day. Associated symptoms include leg pain. Pertinent negatives include no chest pain, fever, headaches or weight loss. She has tried injection treatment (Medication and Laying Down) for the symptoms. The treatment provided no relief.     Review of Systems   Constitution: Negative for chills, fever, malaise/fatigue, weight gain and weight loss.   HENT: Negative for ear pain and hoarse voice.    Eyes: Negative for blurred vision, pain and visual disturbance.   Cardiovascular: Negative for chest pain, dyspnea on exertion and irregular heartbeat.   Respiratory: Negative for cough, shortness of breath and wheezing.    Endocrine: Negative for cold intolerance and heat intolerance.   Hematologic/Lymphatic: Negative for adenopathy and bleeding problem. Does not bruise/bleed easily.   Skin: Negative for color change, itching and rash.   Musculoskeletal: Positive for  back pain.   Gastrointestinal: Negative for change in bowel habit, diarrhea, hematemesis, hematochezia, melena and vomiting.   Genitourinary: Negative for flank pain, frequency, hematuria and urgency.   Neurological: Negative for difficulty with concentration, dizziness, headaches, loss of balance and seizures.   Psychiatric/Behavioral: Positive for depression. Negative for altered mental status and suicidal ideas. The patient is nervous/anxious.    Allergic/Immunologic: Negative for HIV exposure.   All other systems reviewed and are negative.      Past Medical History:   Diagnosis Date    Cancer     skin cancer removed    Crohn's disease     Degenerative joint disease of spine     Esophagitis     History of stomach ulcers     Lumbar facet arthropathy 6/20/2014    Neurogenic bladder        Past Surgical History:   Procedure Laterality Date    BLOCK-NERVE-Selective NerveRoot Bilateral 4/11/2017    [facility]    BREAST RECONSTRUCTION      HYSTERECTOMY      total hysterectomy in 20s due to fibroid, endometriosis    INJECTION-STEROID-EPIDURAL-LUMBAR N/A 3/28/2017    [facility]    INJECTION-STEROID-EPIDURAL-LUMBAR N/A 3/7/2017    [facility]    MASTECTOMY      PELVIC LAPAROSCOPY      TONSILLECTOMY         Review of patient's allergies indicates:   Allergen Reactions    Penicillins Hives       Current Outpatient Medications   Medication Sig Dispense Refill    ALPRAZolam (XANAX) 0.5 MG tablet Take 1 tablet (0.5 mg total) by mouth 2 (two) times daily. as needed for anxiety. 60 tablet 2    desvenlafaxine succinate (PRISTIQ) 50 MG Tb24 desvenlafaxine succinate ER 50 mg tablet,extended release 24 hr   TAKE 1 TABLET (50 MG TOTAL) BY MOUTH ONCE DAILY.      etodolac (LODINE) 400 MG tablet TAKE 1 TABLET BY MOUTH TWICE A DAY 60 tablet 5    ferrous sulfate (IRON) 325 mg (65 mg iron) CpSR Take by  mouth.      gabapentin (NEURONTIN) 300 MG capsule TAKE 3 CAPSULES (900 MG TOTAL) BY MOUTH EVERY EVENING.  2    pantoprazole (PROTONIX) 40 MG tablet pantoprazole 40 mg tablet,delayed release   TAKE 1 TABLET (40 MG TOTAL) BY MOUTH ONCE DAILY.      tiZANidine (ZANAFLEX) 2 MG tablet TAKE 1 TABLET BY MOUTH AT BEDTIME AS NEEDED FOR SPASM(S) 45 tablet 1    topiramate (TOPAMAX) 100 MG tablet TAKE 1 TABLET (100 MG TOTAL) BY MOUTH EVERY EVENING.  5    topiramate (TOPAMAX) 50 MG tablet Take 50 mg by mouth 2 (two) times daily.  5    zolpidem (AMBIEN) 5 MG Tab zolpidem 5 mg tablet   TAKE 1 TABLET BY MOUTH AT BEDTIME      cholestyramine-aspartame (PREVALITE) 4 gram PwPk Prevalite 4 gram powder for susp in a packet      LORazepam (ATIVAN) 1 MG tablet Take 1 tablet (1 mg total) by mouth once. for 1 dose 1 tablet 0    sumatriptan (IMITREX) 25 MG Tab sumatriptan 25 mg tablet   TAKE 1 TABLET (25 MG TOTAL) BY MOUTH EVERY 2 (TWO) HOURS AS NEEDED.      tiZANidine (ZANAFLEX) 2 MG tablet TAKE 1 TABLET BY MOUTH AT BEDTIME AS NEEDED FOR SPASM(S) 45 tablet 1     No current facility-administered medications for this visit.        Family History   Problem Relation Age of Onset    Diabetes Mother     Hypertension Mother     Breast cancer Maternal Aunt 47    Ovarian cancer Maternal Aunt        Social History     Socioeconomic History    Marital status:      Spouse name: Not on file    Number of children: Not on file    Years of education: Not on file    Highest education level: Not on file   Social Needs    Financial resource strain: Not on file    Food insecurity - worry: Not on file    Food insecurity - inability: Not on file    Transportation needs - medical: Not on file    Transportation needs - non-medical: Not on file   Occupational History    Not on file   Tobacco Use    Smoking status: Never Smoker    Smokeless tobacco: Never Used   Substance and Sexual Activity    Alcohol use: Yes     Comment: occ     "Drug use: No    Sexual activity: Yes   Other Topics Concern    Not on file   Social History Narrative    Not on file           Objective:      Vitals:    12/20/18 0921   Resp: 18   Temp: 98.1 °F (36.7 °C)   TempSrc: Oral   Weight: 97 kg (213 lb 12.8 oz)   Height: 5' 2" (1.575 m)   PainSc:   5   PainLoc: Back       Ortho/SPM Exam  GEN:  Well developed, well nourished.  No acute distress. No pain behavior.  HEENT:  No trauma.  Mucous membranes moist.  Nares patent bilaterally.  PSYCH: Normal affect. Thought content appropriate.  CHEST:  Breathing symmetric.  No audible wheezing.  ABD: Soft, non-tender, non-distended.  SKIN:  Warm, pink, dry.  No rash on exposed areas.    EXT:  No cyanosis, clubbing, or edema.  No color change or changes in nail or hair growth.  NEURO/MUSCULOSKELETAL:  Fully alert, oriented, and appropriate. Speech normal belia. No cranial nerve deficits.   Gait: Normal.  Present trendelenburg sign bilaterally.   Motor Strength: 5/5 motor strength throughout lower extremities.   Sensory: No sensory deficit in the lower extremities.   Reflexes:  2+ and symmetric throughout.  Downgoing Babinski's bilaterally.  No clonus or spasticity.  L-Spine:  Decreased ROM with pain on Extension. Negative facet loading bilaterally.  Negative SLR left.    SI Joint/Hip: Negative CHANDNI bilaterally.  Negative FADIR bilaterally.  TTP over left>right lumbar paraspinals.  TTP over left SI joint.  No TTP over hips, piriformis muscles, or GTB.        Imaging:      Result Narrative       MRI lumbar spine without contrast.    Comparison: None.    Technique: Sagittal T1, T2, stir and axial T2 and T1-weighted images were obtained. The patient received 20 cc of Omniscan IV contrast .    Results: The alignment of the lumbar spine is normal. Vertebral body heights and disk spaces are relatively well maintained. No evidence of malignant bone marrow replacement process or infection. The conus medullaris terminates in good " position. Very  mild disk desiccation seen at L4-5 with very minimal disk bulge and small left foraminal disk protrusion. No there is no central canal or foraminal stenosis is seen at any level. There is mild facet joint degenerative changes at L5 S1. The paraspinal  soft tissues appear normal. Following the administration of IV gadolinium contrast, no areas of abnormal enhancement seen to suggest an inflammatory, infectious or tumor process.          Result Impression       Subtle left foraminal disk protrusion at L4-L5 causing no significant mass effect on the exiting nerve root. There is no severe central canal or foraminal stenosis.         Assessment:       Encounter Diagnoses   Name Primary?    Chronic pain disorder Yes    Spondylosis of lumbosacral region without myelopathy or radiculopathy     Chronic nonintractable headache, unspecified headache type     Blood pressure elevated without history of HTN          Plan:       Sarah was seen today for low-back pain.    Diagnoses and all orders for this visit:    Chronic pain disorder    Spondylosis of lumbosacral region without myelopathy or radiculopathy    Chronic nonintractable headache, unspecified headache type    Blood pressure elevated without history of HTN      From her first visit: She has a history of chronic pain following GYN surgery with irritative bladder.  In addition to this, she has a history of multiple life stressors.  Her exam is essentially benign with each of the above diagnoses the relatively minor.  Her MRI is essentially normal.  I suspect that there may be a large underlying central component to her pain.  Because of this, I would favor starting with conservative treatment regiment.  I recommend holding off on interventional procedures for now.  I do not feel that she is a candidate for spinal cord termination at this time.  Of note, on her physical exam today her core strength was quite weak.     Assessment from 3/22/2017: Today,  her central sensitization is under much better control than when I saw her in the past.  Today, she has focal physical exam findings and symptoms of a left L4 radiculitis.     10/3/2018: Today, she presents with a different left sided low back and leg pain that is not the same as before.    I discussed the treatment options with her today, including risks, benefits, and alternatives. All available images were reviewed. The patient is aware of the risks and benefits of the medications being prescribed, common side effects, and proper usage.    1. Outside records scanned into our media tab from [facility] show a previous lumbar radiofrequency the did not provide relief at levels L2-5.  Her symptoms are most consistent with a possible lumbosacral distribution.  She does have TTP over the SI joint.  Overall, her exam findings do not show clear etiology for her pain.   1. I would like to perform medial branch blocks for L5-S3 to evaluate the source of her pain.    2. Continue etodolac.    3. Continue Tizanadine to 2 pills at night prn for pain.  4. I recommended that she monitor her blood pressure at home.  I also recommend not using the Fioricet given her elevated blood pressure today.  This medication contains caffeine, which may raise her blood pressure.  I encouraged her to talk about this with her primary care physician.  5. Given her failed response to multiple traditional treatments for migraine headaches, I wonder if her headache is more of a stress response.  We had previously discussed investigating online resources related to psychological treatment of chronic pain, including modalities such as CBT, biofeedback techniques, meditation, and relaxation.  We discussed using an today as well as investigating the source of her stress, be it situational, emotional, or physical.  6. I have stressed the importance of physical activity and a home exercise plan to help with pain and improve overall  health.  7. RTC for above.     Greater than 30 minutes spent in total in todays visit with the patient, with more than half that time direct face to face counseling and education with the patient today. We discussed the disease process, prognosis, treatment plan, and risks and benefits.    Andra Burt MD  12/20/2018    The above plan and management options were discussed at length with patient. Patient is in agreement with the above and verbalized understanding. It will be communicated with the consulting physician via electronic record, fax, or mail

## 2018-12-20 NOTE — H&P (VIEW-ONLY)
Subjective:      Patient ID: Sarah Mukherjee is a 38 y.o. female.    Chief Complaint: Low-back Pain    Referred by: No ref. provider found     HPI:    Mrs. Mukherjee is a 34-year-old female who presents today with Low back pain.  Her medical history is significant for depression, anxiety, neurogenic bladder and endometriosis, status post total hysterectomy. She reports her back pain is present about 2 and half years.  She has been seeing Dr. Matt for her pain.  He has had multiple injections for her all with limited benefit.  These have included radiofrequency ablations and epidurals.  Her most recent was March 11 of this year.  This procedure actually worsened her pain. Of note, she reports multiple life stressors, including the fact that she has had a hysterectomy which means that she her  cannot have children.   Her pain is described in detail below.    Interval History (1/26/2017):  She returns today for follow up.  She reports that she was doing better until 2 months ago when she start having a new onset of left lower extremity radiating pain.  The pain originates in her low back and radiates down the posterolateral aspect of her left leg to the anterior shin.  This began after she was hunting and sitting for a prolonged period of time in a deer stand.  She had one what sounds like a Left L4 TFESI by Dr. Mike Stewart that caused her pain to be worse.  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    Interval Hisotyr (3/22/2017):  She returns to clinic today following recent L4-5 IL STEWART on 3/7/17 without continued relief of symptoms.  States that she received about 2 days worth of significant relief, but her symptoms returned soon thereafter.  Feels like they are possibly worse than prior to STEWART.  Describes symptoms in same way - constant left-sided lumbosacral pain that radiates down the posterior aspect into her knee.  Radiating pain less frequent than the constant back  pain but can be more severe at times.  Not currently doing any stretching or strengthening exercises.  Taking Gabapentin and Tizanidine at night.      Interval History (10/3/2018):  She returns today for follow up.  She reports that the injections helped somewhat for 4-6 weeks.  After that, she started physical therapy after that, which worsening her pain.  She then had a few falls in which she felt that her leg gave out.  Each fall caused worsening pain. Tizanidine 4 mg at night has been helpful for the pain, but she is not taking anything during the day.    Of note, she had an injection with Dr. Mccormack in the past year.  She thinks that this was a radiofrequency ablation.  This did not provide any relief.  She then had an SI joint injection.     Interval History (12/20/2018):  She returns today for follow up.  She reports that her pain is the same.  Of note, she is reporting worsening migraines.  She reports that these happened when her pain is bad.  She has noticed that they have been getting worse lately.  Of note, she also notes that her blood pressure has been of lately.  Today, it is 153/108.  She reports that she was recently given Fioricet and that she took 2 of them this morning.    Physical Therapy: The extended course of physical therapy in 2013  This was helpful while she was going.  She did her home exercise program until a recent bladder infection.  Since that time she's not done her HEP    Non-pharmacologic Treatment: Rest and heat make her pain better.         · TENS? Not tried    Pain Medications:         · Currently taking: Tizanidine 4 mg QHS, etodolac 4 mg twice daily, Topamax, Ambien, Xanax, Imitrex, Fioricet    · Has tried in the past:  Gabapentin 900 mg QHS, Hydrocodone/acetaminophen was not helpful.  She recently tried gabapentin 100 mg daily with no benefit.  Relafen BID.    · Has not tried: TCAs, SNRIs, cream    Blood thinners: None    Interventional Therapies:   · 3/7/17 L4-5 IL  STEWART  · Multiple epidural steroid injections and radiofrequency ablation.  · Left L4 TFESI: worsening pain  · ? RFA with Dr. Martinez:   · ? SI joint injection: No benefit  · Left L2-5 RFA 2014  · Left SI joint injection 2014    Relevant Surgeries: She has had a hysterectomy    Affecting sleep? Yes    Affecting daily activities? Yes    Depressive symptoms? Yes          · SI/HI? No    Work status: She is disabled    Last 3 PDI Scores 12/20/2018 10/3/2018 3/22/2017   Pain Disability Index (PDI) 29 19.5 50       Pain Scales  Best: 5/10  Worst: 10/10  Usually: 5/10  Today: 5/10    Low-back Pain   This is a chronic problem. The current episode started more than 1 year ago. The problem occurs constantly. The problem has been gradually worsening since onset. The pain is present in the lumbar spine. The pain does not radiate. Quality: Sharp and Deep. The pain is at a severity of 9/10. The pain is moderate. The pain is the same all the time. The symptoms are aggravated by standing and bending (Walking and Getting out of bed or chair). Stiffness is present at night and all day. Associated symptoms include leg pain. Pertinent negatives include no chest pain, fever, headaches or weight loss. She has tried injection treatment (Medication and Laying Down) for the symptoms. The treatment provided no relief.     Review of Systems   Constitution: Negative for chills, fever, malaise/fatigue, weight gain and weight loss.   HENT: Negative for ear pain and hoarse voice.    Eyes: Negative for blurred vision, pain and visual disturbance.   Cardiovascular: Negative for chest pain, dyspnea on exertion and irregular heartbeat.   Respiratory: Negative for cough, shortness of breath and wheezing.    Endocrine: Negative for cold intolerance and heat intolerance.   Hematologic/Lymphatic: Negative for adenopathy and bleeding problem. Does not bruise/bleed easily.   Skin: Negative for color change, itching and rash.   Musculoskeletal: Positive for  back pain.   Gastrointestinal: Negative for change in bowel habit, diarrhea, hematemesis, hematochezia, melena and vomiting.   Genitourinary: Negative for flank pain, frequency, hematuria and urgency.   Neurological: Negative for difficulty with concentration, dizziness, headaches, loss of balance and seizures.   Psychiatric/Behavioral: Positive for depression. Negative for altered mental status and suicidal ideas. The patient is nervous/anxious.    Allergic/Immunologic: Negative for HIV exposure.   All other systems reviewed and are negative.      Past Medical History:   Diagnosis Date    Cancer     skin cancer removed    Crohn's disease     Degenerative joint disease of spine     Esophagitis     History of stomach ulcers     Lumbar facet arthropathy 6/20/2014    Neurogenic bladder        Past Surgical History:   Procedure Laterality Date    BLOCK-NERVE-Selective NerveRoot Bilateral 4/11/2017    [facility]    BREAST RECONSTRUCTION      HYSTERECTOMY      total hysterectomy in 20s due to fibroid, endometriosis    INJECTION-STEROID-EPIDURAL-LUMBAR N/A 3/28/2017    [facility]    INJECTION-STEROID-EPIDURAL-LUMBAR N/A 3/7/2017    [facility]    MASTECTOMY      PELVIC LAPAROSCOPY      TONSILLECTOMY         Review of patient's allergies indicates:   Allergen Reactions    Penicillins Hives       Current Outpatient Medications   Medication Sig Dispense Refill    ALPRAZolam (XANAX) 0.5 MG tablet Take 1 tablet (0.5 mg total) by mouth 2 (two) times daily. as needed for anxiety. 60 tablet 2    desvenlafaxine succinate (PRISTIQ) 50 MG Tb24 desvenlafaxine succinate ER 50 mg tablet,extended release 24 hr   TAKE 1 TABLET (50 MG TOTAL) BY MOUTH ONCE DAILY.      etodolac (LODINE) 400 MG tablet TAKE 1 TABLET BY MOUTH TWICE A DAY 60 tablet 5    ferrous sulfate (IRON) 325 mg (65 mg iron) CpSR Take by  mouth.      gabapentin (NEURONTIN) 300 MG capsule TAKE 3 CAPSULES (900 MG TOTAL) BY MOUTH EVERY EVENING.  2    pantoprazole (PROTONIX) 40 MG tablet pantoprazole 40 mg tablet,delayed release   TAKE 1 TABLET (40 MG TOTAL) BY MOUTH ONCE DAILY.      tiZANidine (ZANAFLEX) 2 MG tablet TAKE 1 TABLET BY MOUTH AT BEDTIME AS NEEDED FOR SPASM(S) 45 tablet 1    topiramate (TOPAMAX) 100 MG tablet TAKE 1 TABLET (100 MG TOTAL) BY MOUTH EVERY EVENING.  5    topiramate (TOPAMAX) 50 MG tablet Take 50 mg by mouth 2 (two) times daily.  5    zolpidem (AMBIEN) 5 MG Tab zolpidem 5 mg tablet   TAKE 1 TABLET BY MOUTH AT BEDTIME      cholestyramine-aspartame (PREVALITE) 4 gram PwPk Prevalite 4 gram powder for susp in a packet      LORazepam (ATIVAN) 1 MG tablet Take 1 tablet (1 mg total) by mouth once. for 1 dose 1 tablet 0    sumatriptan (IMITREX) 25 MG Tab sumatriptan 25 mg tablet   TAKE 1 TABLET (25 MG TOTAL) BY MOUTH EVERY 2 (TWO) HOURS AS NEEDED.      tiZANidine (ZANAFLEX) 2 MG tablet TAKE 1 TABLET BY MOUTH AT BEDTIME AS NEEDED FOR SPASM(S) 45 tablet 1     No current facility-administered medications for this visit.        Family History   Problem Relation Age of Onset    Diabetes Mother     Hypertension Mother     Breast cancer Maternal Aunt 47    Ovarian cancer Maternal Aunt        Social History     Socioeconomic History    Marital status:      Spouse name: Not on file    Number of children: Not on file    Years of education: Not on file    Highest education level: Not on file   Social Needs    Financial resource strain: Not on file    Food insecurity - worry: Not on file    Food insecurity - inability: Not on file    Transportation needs - medical: Not on file    Transportation needs - non-medical: Not on file   Occupational History    Not on file   Tobacco Use    Smoking status: Never Smoker    Smokeless tobacco: Never Used   Substance and Sexual Activity    Alcohol use: Yes     Comment: occ     "Drug use: No    Sexual activity: Yes   Other Topics Concern    Not on file   Social History Narrative    Not on file           Objective:      Vitals:    12/20/18 0921   Resp: 18   Temp: 98.1 °F (36.7 °C)   TempSrc: Oral   Weight: 97 kg (213 lb 12.8 oz)   Height: 5' 2" (1.575 m)   PainSc:   5   PainLoc: Back       Ortho/SPM Exam  GEN:  Well developed, well nourished.  No acute distress. No pain behavior.  HEENT:  No trauma.  Mucous membranes moist.  Nares patent bilaterally.  PSYCH: Normal affect. Thought content appropriate.  CHEST:  Breathing symmetric.  No audible wheezing.  ABD: Soft, non-tender, non-distended.  SKIN:  Warm, pink, dry.  No rash on exposed areas.    EXT:  No cyanosis, clubbing, or edema.  No color change or changes in nail or hair growth.  NEURO/MUSCULOSKELETAL:  Fully alert, oriented, and appropriate. Speech normal belia. No cranial nerve deficits.   Gait: Normal.  Present trendelenburg sign bilaterally.   Motor Strength: 5/5 motor strength throughout lower extremities.   Sensory: No sensory deficit in the lower extremities.   Reflexes:  2+ and symmetric throughout.  Downgoing Babinski's bilaterally.  No clonus or spasticity.  L-Spine:  Decreased ROM with pain on Extension. Negative facet loading bilaterally.  Negative SLR left.    SI Joint/Hip: Negative CHANDNI bilaterally.  Negative FADIR bilaterally.  TTP over left>right lumbar paraspinals.  TTP over left SI joint.  No TTP over hips, piriformis muscles, or GTB.        Imaging:      Result Narrative       MRI lumbar spine without contrast.    Comparison: None.    Technique: Sagittal T1, T2, stir and axial T2 and T1-weighted images were obtained. The patient received 20 cc of Omniscan IV contrast .    Results: The alignment of the lumbar spine is normal. Vertebral body heights and disk spaces are relatively well maintained. No evidence of malignant bone marrow replacement process or infection. The conus medullaris terminates in good " position. Very  mild disk desiccation seen at L4-5 with very minimal disk bulge and small left foraminal disk protrusion. No there is no central canal or foraminal stenosis is seen at any level. There is mild facet joint degenerative changes at L5 S1. The paraspinal  soft tissues appear normal. Following the administration of IV gadolinium contrast, no areas of abnormal enhancement seen to suggest an inflammatory, infectious or tumor process.          Result Impression       Subtle left foraminal disk protrusion at L4-L5 causing no significant mass effect on the exiting nerve root. There is no severe central canal or foraminal stenosis.         Assessment:       Encounter Diagnoses   Name Primary?    Chronic pain disorder Yes    Spondylosis of lumbosacral region without myelopathy or radiculopathy     Chronic nonintractable headache, unspecified headache type     Blood pressure elevated without history of HTN          Plan:       Sarah was seen today for low-back pain.    Diagnoses and all orders for this visit:    Chronic pain disorder    Spondylosis of lumbosacral region without myelopathy or radiculopathy    Chronic nonintractable headache, unspecified headache type    Blood pressure elevated without history of HTN      From her first visit: She has a history of chronic pain following GYN surgery with irritative bladder.  In addition to this, she has a history of multiple life stressors.  Her exam is essentially benign with each of the above diagnoses the relatively minor.  Her MRI is essentially normal.  I suspect that there may be a large underlying central component to her pain.  Because of this, I would favor starting with conservative treatment regiment.  I recommend holding off on interventional procedures for now.  I do not feel that she is a candidate for spinal cord termination at this time.  Of note, on her physical exam today her core strength was quite weak.     Assessment from 3/22/2017: Today,  her central sensitization is under much better control than when I saw her in the past.  Today, she has focal physical exam findings and symptoms of a left L4 radiculitis.     10/3/2018: Today, she presents with a different left sided low back and leg pain that is not the same as before.    I discussed the treatment options with her today, including risks, benefits, and alternatives. All available images were reviewed. The patient is aware of the risks and benefits of the medications being prescribed, common side effects, and proper usage.    1. Outside records scanned into our media tab from [facility] show a previous lumbar radiofrequency the did not provide relief at levels L2-5.  Her symptoms are most consistent with a possible lumbosacral distribution.  She does have TTP over the SI joint.  Overall, her exam findings do not show clear etiology for her pain.   1. I would like to perform medial branch blocks for L5-S3 to evaluate the source of her pain.    2. Continue etodolac.    3. Continue Tizanadine to 2 pills at night prn for pain.  4. I recommended that she monitor her blood pressure at home.  I also recommend not using the Fioricet given her elevated blood pressure today.  This medication contains caffeine, which may raise her blood pressure.  I encouraged her to talk about this with her primary care physician.  5. Given her failed response to multiple traditional treatments for migraine headaches, I wonder if her headache is more of a stress response.  We had previously discussed investigating online resources related to psychological treatment of chronic pain, including modalities such as CBT, biofeedback techniques, meditation, and relaxation.  We discussed using an today as well as investigating the source of her stress, be it situational, emotional, or physical.  6. I have stressed the importance of physical activity and a home exercise plan to help with pain and improve overall  health.  7. RTC for above.     Greater than 30 minutes spent in total in todays visit with the patient, with more than half that time direct face to face counseling and education with the patient today. We discussed the disease process, prognosis, treatment plan, and risks and benefits.    Andra Burt MD  12/20/2018    The above plan and management options were discussed at length with patient. Patient is in agreement with the above and verbalized understanding. It will be communicated with the consulting physician via electronic record, fax, or mail

## 2019-01-04 ENCOUNTER — HOSPITAL ENCOUNTER (OUTPATIENT)
Facility: OTHER | Age: 39
Discharge: HOME | End: 2019-01-04
Attending: ANESTHESIOLOGY | Admitting: ANESTHESIOLOGY
Payer: COMMERCIAL

## 2019-01-04 VITALS
HEIGHT: 62 IN | BODY MASS INDEX: 39.2 KG/M2 | OXYGEN SATURATION: 98 % | DIASTOLIC BLOOD PRESSURE: 86 MMHG | HEART RATE: 75 BPM | RESPIRATION RATE: 18 BRPM | SYSTOLIC BLOOD PRESSURE: 143 MMHG | WEIGHT: 213 LBS | TEMPERATURE: 97 F

## 2019-01-04 DIAGNOSIS — M47.817 LUMBOSACRAL SPONDYLOSIS: ICD-10-CM

## 2019-01-04 DIAGNOSIS — M47.27 LUMBOSACRAL SPONDYLOSIS WITH RADICULOPATHY: Primary | ICD-10-CM

## 2019-01-04 PROCEDURE — 64495 INJ PARAVERT F JNT L/S 3 LEV: CPT | Performed by: ANESTHESIOLOGY

## 2019-01-04 PROCEDURE — 64494 PR INJ DX/THER AGNT PARAVERT FACET JOINT,IMG GUIDE,LUMBAR/SAC, 2ND LEVEL: ICD-10-PCS | Mod: LT,,, | Performed by: ANESTHESIOLOGY

## 2019-01-04 PROCEDURE — 25000003 PHARM REV CODE 250: Performed by: ANESTHESIOLOGY

## 2019-01-04 PROCEDURE — 64494 INJ PARAVERT F JNT L/S 2 LEV: CPT | Mod: LT,,, | Performed by: ANESTHESIOLOGY

## 2019-01-04 PROCEDURE — S0020 INJECTION, BUPIVICAINE HYDRO: HCPCS | Performed by: ANESTHESIOLOGY

## 2019-01-04 PROCEDURE — 64495 INJ PARAVERT F JNT L/S 3 LEV: CPT | Mod: LT,,, | Performed by: ANESTHESIOLOGY

## 2019-01-04 PROCEDURE — 64495 PR INJ DX/THER AGNT PARAVERT FACET JOINT,IMG GUIDE,LUMBAR/SAC, ADD LEVEL: ICD-10-PCS | Mod: LT,,, | Performed by: ANESTHESIOLOGY

## 2019-01-04 PROCEDURE — 64494 INJ PARAVERT F JNT L/S 2 LEV: CPT | Performed by: ANESTHESIOLOGY

## 2019-01-04 PROCEDURE — 64493 PR INJ DX/THER AGNT PARAVERT FACET JOINT,IMG GUIDE,LUMBAR/SAC,1ST LVL: ICD-10-PCS | Mod: LT,,, | Performed by: ANESTHESIOLOGY

## 2019-01-04 PROCEDURE — 64493 INJ PARAVERT F JNT L/S 1 LEV: CPT | Mod: LT,,, | Performed by: ANESTHESIOLOGY

## 2019-01-04 PROCEDURE — 64493 INJ PARAVERT F JNT L/S 1 LEV: CPT | Performed by: ANESTHESIOLOGY

## 2019-01-04 RX ORDER — BUPIVACAINE HYDROCHLORIDE 5 MG/ML
INJECTION, SOLUTION EPIDURAL; INTRACAUDAL
Status: DISCONTINUED | OUTPATIENT
Start: 2019-01-04 | End: 2019-01-04 | Stop reason: HOSPADM

## 2019-01-04 RX ORDER — LIDOCAINE HYDROCHLORIDE 10 MG/ML
INJECTION INFILTRATION; PERINEURAL
Status: DISCONTINUED | OUTPATIENT
Start: 2019-01-04 | End: 2019-01-04 | Stop reason: HOSPADM

## 2019-01-04 NOTE — DISCHARGE INSTRUCTIONS
Thank you for allowing us to care for you today. You may receive a survey about the care we provided. Your feedback is valuable and helps us provide excellent care throughout the community.     Home Care Instructions for Pain Management:    1. DIET:   You may resume your normal diet today.   2. BATHING:   You may shower with luke warm water. No tub baths or anything that will soak injection sites under water for the next 24 hours.  3. DRESSING:   You may remove your bandage today.   4. ACTIVITY LEVEL:   You may resume your normal activities 24 hrs after your procedure. Nothing strenuous today.  5. MEDICATIONS:   You may resume your normal medications today. To restart blood thinners, ask your doctor.  6. DRIVING    If you have received any sedatives by mouth today, you may not drive for 12 hours.    If you have received any sedation through your IV, you may not drive for 24 hrs.   7. SPECIAL INSTRUCTIONS:   No heat to the injection site for 24 hrs including, hot bath or shower, heating pad, moist heat, or hot tubs.    Use ice pack to injection site for any pain or discomfort.  Apply ice packs for 20 minute intervals as needed.    IF you have diabetes, be sure to monitor your blood sugar more closely. IF your injection contained steroids your blood sugar levels may become higher than normal.    If you are still having pain upon discharge:  Your pain may improve over the next 48 hours. The anesthetic (numbing medication) works immediately to 48 hours. IF your injection contained a steroid (anti-inflammatory medication), it takes approximately 3 days to start feeling relief and 7-10 days to see your greatest results from the medication. It is possible you may need subsequent injections. This would be discussed at your follow up appointment with pain management or your referring doctor.      PLEASE CALL YOUR DOCTOR IF:  1. Redness or swelling around the injection site.  2. Fever of 101 degrees or more  3. Drainage  (pus) from the injection site.  4. For any continuous bleeding (some dried blood over the incision is normal.)    FOR EMERGENCIES:   If any unusual problems or difficulties occur during clinic hours, call [phone] or [phone].

## 2019-01-04 NOTE — OP NOTE
Date of Procedure: 01/04/2019    Procedure: Left L5-S3 Lumbar medial branch blocks    Pre-op diagnosis: Lumbar Spondylosis [M47.816]    Post-op diagnosis: Lumbar Spondylosis [M47.816]     Physician: Dr. Andra Burt     Assistant: None    Anesthestia: local    EBL: None    Specimens: None    All medications, allergies, and relevant histories were reviewed. No recent antibiotics or infections.  A time-out was taken to verify the correct patient, procedure, laterality, and appropriate medications/allergies.    Lumbar Medial Branch Block:   The procedure risks, benefits, and possible complications were discussed with the patient including nerve damage, spinal headache, bleeding, infection, and failure of pain relief.   Patient was placed in the prone position with the midriff elevated. Oblique view of the spine was obtained with fluoroscopy. Entry sites marked over the skin. The skin was prepped with chlorhexidine x3 and draped. Sterile precautions observed throughout the procedure. Xylocaine 1% was infiltrated locally over the entry site. A 22-gauge spinal needle was introduced at an angle, and the needle was placed onto the junction of the superior articular process and the most supermedial point on the transverse process. Placement was confirmed with a fluoroscopic view. After negative aspiration, 1cc of bupivacaine 0.5% was injected at each level. Needle was restyletted and removed. Procedure performed at the levels indicated: Left:L5-S3.     Patient tolerated the procedure well and there were no complications.   The patient was discharged home with a responsible adult.      Future Management:   If good results, can proceed to RFA.  If no relief, can follow up to discuss options.

## 2019-01-07 ENCOUNTER — PATIENT MESSAGE (OUTPATIENT)
Dept: PAIN MEDICINE | Facility: OTHER | Age: 39
End: 2019-01-07

## 2019-01-08 NOTE — TELEPHONE ENCOUNTER
Hello, can we change the procedure on Friday to the RFA L5-S3 with Halyard Coolief and IV sedation instead of the second block?  She had a positive block last Friday.      Thanks!  LW

## 2019-01-10 ENCOUNTER — TELEPHONE (OUTPATIENT)
Dept: PAIN MEDICINE | Facility: CLINIC | Age: 39
End: 2019-01-10

## 2019-01-10 NOTE — TELEPHONE ENCOUNTER
Procedure changed to Left L5-S3 RFA as ordered per Dr. Burt (see message below).    New cpt 84259 and 29088    Communicated with Mary Mccollum with pre-service, she checked and stated no auth required for those codes.    Thank you.

## 2019-01-10 NOTE — TELEPHONE ENCOUNTER
----- Message from Nessa Thompson sent at 1/9/2019  7:24 AM CST -----  Cristin, please see Dr Burt's instructions below, thank you:    Hello, can we change the procedure on Friday to the RFA L5-S3 with Halyard Coolief and IV sedation instead of the second block?  She had a positive block last Friday.       Thanks!  LW      Lorena Her LPN Touchard, Brandy H 2 days ago       Hi,   Good to hear that you had some pain relief from the procedure. This message has been sent to Dr Burt for a reply. Have a  Nice day       Lorena Sims LPN routed conversation to Andra Burt MD 2 days ago    Sarah Mukherjee Lesley S., MD 2 days ago       Dr. Burt I had absolutely no pain until Sunday morning. I think you definitely hit the right spot. Now do we have to plan second test or can skip and schedule actual procedure for Friday.

## 2019-01-11 ENCOUNTER — HOSPITAL ENCOUNTER (OUTPATIENT)
Facility: OTHER | Age: 39
Discharge: HOME | End: 2019-01-11
Attending: ANESTHESIOLOGY | Admitting: ANESTHESIOLOGY
Payer: COMMERCIAL

## 2019-01-11 VITALS
BODY MASS INDEX: 39.2 KG/M2 | TEMPERATURE: 98 F | DIASTOLIC BLOOD PRESSURE: 89 MMHG | WEIGHT: 213 LBS | HEART RATE: 78 BPM | OXYGEN SATURATION: 98 % | HEIGHT: 62 IN | RESPIRATION RATE: 18 BRPM | SYSTOLIC BLOOD PRESSURE: 150 MMHG

## 2019-01-11 DIAGNOSIS — M47.816 SPONDYLOSIS OF LUMBAR SPINE: ICD-10-CM

## 2019-01-11 DIAGNOSIS — M47.817 SPONDYLOSIS OF LUMBOSACRAL REGION, UNSPECIFIED SPINAL OSTEOARTHRITIS COMPLICATION STATUS: Primary | ICD-10-CM

## 2019-01-11 PROCEDURE — A4649 SURGICAL SUPPLIES: HCPCS | Performed by: ANESTHESIOLOGY

## 2019-01-11 PROCEDURE — 64635 DESTROY LUMB/SAC FACET JNT: CPT | Performed by: ANESTHESIOLOGY

## 2019-01-11 PROCEDURE — 64635 DESTROY LUMB/SAC FACET JNT: CPT | Mod: LT,,, | Performed by: ANESTHESIOLOGY

## 2019-01-11 PROCEDURE — 63600175 PHARM REV CODE 636 W HCPCS: Performed by: ANESTHESIOLOGY

## 2019-01-11 PROCEDURE — 25000003 PHARM REV CODE 250: Performed by: ANESTHESIOLOGY

## 2019-01-11 PROCEDURE — 99152 PR MOD CONSCIOUS SEDATION, SAME PHYS, 5+ YRS, FIRST 15 MIN: ICD-10-PCS | Mod: ,,, | Performed by: ANESTHESIOLOGY

## 2019-01-11 PROCEDURE — 64636 DESTROY L/S FACET JNT ADDL: CPT | Mod: LT,,, | Performed by: ANESTHESIOLOGY

## 2019-01-11 PROCEDURE — 99152 MOD SED SAME PHYS/QHP 5/>YRS: CPT | Mod: ,,, | Performed by: ANESTHESIOLOGY

## 2019-01-11 PROCEDURE — A4216 STERILE WATER/SALINE, 10 ML: HCPCS | Performed by: ANESTHESIOLOGY

## 2019-01-11 PROCEDURE — 64635 PR DESTROY LUMB/SAC FACET JNT: ICD-10-PCS | Mod: LT,,, | Performed by: ANESTHESIOLOGY

## 2019-01-11 PROCEDURE — 64636 PR DESTROY L/S FACET JNT ADDL: ICD-10-PCS | Mod: LT,,, | Performed by: ANESTHESIOLOGY

## 2019-01-11 PROCEDURE — S0020 INJECTION, BUPIVICAINE HYDRO: HCPCS | Performed by: ANESTHESIOLOGY

## 2019-01-11 PROCEDURE — 64636 DESTROY L/S FACET JNT ADDL: CPT | Performed by: ANESTHESIOLOGY

## 2019-01-11 RX ORDER — MIDAZOLAM HYDROCHLORIDE 1 MG/ML
INJECTION INTRAMUSCULAR; INTRAVENOUS
Status: DISCONTINUED | OUTPATIENT
Start: 2019-01-11 | End: 2019-01-11 | Stop reason: HOSPADM

## 2019-01-11 RX ORDER — FENTANYL CITRATE 50 UG/ML
INJECTION, SOLUTION INTRAMUSCULAR; INTRAVENOUS
Status: DISCONTINUED | OUTPATIENT
Start: 2019-01-11 | End: 2019-01-11 | Stop reason: HOSPADM

## 2019-01-11 RX ORDER — SODIUM CHLORIDE 9 MG/ML
INJECTION, SOLUTION INTRAVENOUS CONTINUOUS
Status: DISCONTINUED | OUTPATIENT
Start: 2019-01-11 | End: 2019-01-11 | Stop reason: HOSPADM

## 2019-01-11 RX ORDER — BUPIVACAINE HYDROCHLORIDE 5 MG/ML
INJECTION, SOLUTION EPIDURAL; INTRACAUDAL
Status: DISCONTINUED | OUTPATIENT
Start: 2019-01-11 | End: 2019-01-11 | Stop reason: HOSPADM

## 2019-01-11 RX ORDER — LIDOCAINE HYDROCHLORIDE 20 MG/ML
INJECTION, SOLUTION INFILTRATION; PERINEURAL
Status: DISCONTINUED | OUTPATIENT
Start: 2019-01-11 | End: 2019-01-11 | Stop reason: HOSPADM

## 2019-01-11 RX ORDER — DEXAMETHASONE SODIUM PHOSPHATE 4 MG/ML
INJECTION, SOLUTION INTRA-ARTICULAR; INTRALESIONAL; INTRAMUSCULAR; INTRAVENOUS; SOFT TISSUE
Status: DISCONTINUED | OUTPATIENT
Start: 2019-01-11 | End: 2019-01-11 | Stop reason: HOSPADM

## 2019-01-11 RX ORDER — SODIUM CHLORIDE 9 MG/ML
INJECTION, SOLUTION INTRAMUSCULAR; INTRAVENOUS; SUBCUTANEOUS
Status: DISCONTINUED | OUTPATIENT
Start: 2019-01-11 | End: 2019-01-11 | Stop reason: HOSPADM

## 2019-01-11 RX ADMIN — SODIUM CHLORIDE: 0.9 INJECTION, SOLUTION INTRAVENOUS at 09:01

## 2019-01-11 NOTE — INTERVAL H&P NOTE
The patient has been examined and the H&P has been reviewed:    I concur with the findings and no changes have occurred since H&P was written. She responded well to MBBs at the same levels on 1/4/19. Scheduled for RFA on the left today at L5, S1, S2, and S3.    No change in the location or quality of the pain since the most recent clinic visit.  No new symptoms.  She wishes to proceed with the procedure today.    PE, unchanged from previous:  CV:  RRR  Resp: unlabored, no wheezing.    NPO since MN.    Anesthesia/Surgery risks, benefits and alternative options discussed and understood by patient/family.          Active Hospital Problems    Diagnosis  POA    Spondylosis of lumbar spine [M47.816]  Yes      Resolved Hospital Problems   No resolved problems to display.       Discussed and evaluated patient with staff. Thank you for allowing me to participate in their care.     Temo Anna  Pain Medicine Fellow  [facility]  PGYV    I have seen the patient with the fellow physician.  We have come up with the above plan.  The patient is in agreement with our plan.

## 2019-01-11 NOTE — DISCHARGE INSTRUCTIONS
Adult Procedural Sedation Instructions    Recovery After Procedural Sedation (Adult)  You have been given medicine by vein to make you sleep during your surgery. This may have included both a pain medicine and sleeping medicine. Most of the effects have worn off. But you may still have some drowsiness for the next 6 to 8 hours.  Home care  Follow these guidelines when you get home:  · For the next 8 hours, you should be watched by a responsible adult. This person should make sure your condition is not getting worse.  · Don't drink any alcohol for the next 24 hours.  · Don't drive, operate dangerous machinery, or make important business or personal decisions during the next 24 hours.  Note: Your healthcare provider may tell you not to take any medicine by mouth for pain or sleep in the next 4 hours. These medicines may react with the medicines you were given in the hospital. This could cause a much stronger response than usual.  Follow-up care  Follow up with your healthcare provider if you are not alert and back to your usual level of activity within 12 hours.  When to seek medical advice  Call your healthcare provider right away if any of these occur:  · Drowsiness gets worse  · Weakness or dizziness gets worse  · Repeated vomiting  · You can't be awakened   Date Last Reviewed: 10/18/2016  © 2426-8978 [employer]. [address] [place] [address], [place] [address]. All rights reserved. This information is not intended as a substitute for professional medical care. Always follow your healthcare professional's instructions.       Thank you for allowing us to care for you today. You may receive a survey about the care we provided. Your feedback is valuable and helps us provide excellent care throughout the community.     Home Care Instructions for Pain Management:    1. DIET:   You may resume your normal diet today.   2. BATHING:   You may shower with luke warm water. No tub baths or anything that will soak  injection sites under water for the next 24 hours.  3. DRESSING:   You may remove your bandage today.   4. ACTIVITY LEVEL:   You may resume your normal activities 24 hrs after your procedure. Nothing strenuous today.  5. MEDICATIONS:   You may resume your normal medications today. To restart blood thinners, ask your doctor.  6. DRIVING    If you have received any sedatives by mouth today, you may not drive for 12 hours.    If you have received any sedation through your IV, you may not drive for 24 hrs.   7. SPECIAL INSTRUCTIONS:   No heat to the injection site for 24 hrs including, hot bath or shower, heating pad, moist heat, or hot tubs.    Use ice pack to injection site for any pain or discomfort.  Apply ice packs for 20 minute intervals as needed.    IF you have diabetes, be sure to monitor your blood sugar more closely. IF your injection contained steroids your blood sugar levels may become higher than normal.    If you are still having pain upon discharge:  Your pain may improve over the next 48 hours. The anesthetic (numbing medication) works immediately to 48 hours. IF your injection contained a steroid (anti-inflammatory medication), it takes approximately 3 days to start feeling relief and 7-10 days to see your greatest results from the medication. It is possible you may need subsequent injections. This would be discussed at your follow up appointment with pain management or your referring doctor.      PLEASE CALL YOUR DOCTOR IF:  1. Redness or swelling around the injection site.  2. Fever of 101 degrees or more  3. Drainage (pus) from the injection site.  4. For any continuous bleeding (some dried blood over the incision is normal.)    FOR EMERGENCIES:   If any unusual problems or difficulties occur during clinic hours, call [phone] or [phone].

## 2019-01-11 NOTE — OP NOTE
"Date of Procedure: 01/11/2019    Procedure: L5-S3 Left Medial Branch Nerve Coolief thermal Radiofrequency Ablation    Pre-op diagnosis: Lumbosacral spondylosis    Post-op diagnosis: Lumbosacral spondylosis     Physician: Dr. Andra Burt     Assistant: Dr. Anna    Anesthestia: local/IV sedation: Versed 2 mg and fentanyl 100 mcg IV. Conscious sedation provided by MD and monitored by RN. Total sedation time was less than 60 minutes. (See nurse documentation and case log for sedation time)    EBL: None    Specimens: None    All medications, allergies, and relevant histories were reviewed. No recent antibiotics or infections. A time-out was taken to verify the correct patient, procedure, laterality, and appropriate medications/allergies.    Procedure: Lumbosacral cooled RFA    Lumbar Medial Branch Block with conventional radiofrequency, levels L5-S3     The procedure risks, benefits, and possible complications were discussed with the patient including nerve damage, infection, spinal headache, and paresis.   Patient was placed in the prone position with the midriff elevated. Skin was prepped with CHG and draped. Oblique view of the spine was obtained with fluoroscopy. Entry sites were marked over the skin and Xylocaine 1% was used to anesthetize the skin and subcutaneous tissues.     A 17-gauge insulated, Halyard Coolief RF needle was introduced at an angle to the junction of the S1 superior articular process and sacral ala for L5.     For S1, S2, and S3, the needle was initially place 10 mm lateral to the opening of the corresponding neural foramen. Next, the needles were placed at both the "1 o'clock", maintaining the same 10 mm distance from the opening. After each lesion, the needles were adjusted so that the lesion covered the entire lateral aspect of the neural foramen, maintaining the 10 mm distance.  The below cooled RF thermal lesions were created in each spot.     No motor stimulation was elicited at any level " at 2V with a frequency of 2Hz. All impedances were less than 500 mA.    1cc of 2% lidocaine was injected at each level.  Coolief Thermal RF was then conducted at each level at 60 degrees at the probe, 80 degree lesion, for 2:30 minutes per level.  1 cc of a mixture of 0.5% bupivacaine with dexamethasone 5 mg was injected at each level.     Patient tolerated the procedure well and there were no complications.      Future Management:   If helpful, can repeat as needed. Follow up with me in 4-6 weeks.    I certify that I provided the above services.  I was present for the entire procedure, which was performed by the fellow physician under my supervision.  There were no parts of the procedure that were performed not by myself or without my direct supervision.

## 2019-01-11 NOTE — DISCHARGE SUMMARY
Discharge Note  Short Stay      SUMMARY     Admit Date: 1/11/2019    Attending Physician: Andra Burt    Procedure: L5-S3 Left Medial Branch Nerve Coolief thermal Radiofrequency Ablation    Discharge Physician: Andra Burt    Discharge Date: 1/11/2019 10:53 AM    Final Diagnosis: Spondylosis of lumbosacral region without myelopathy or radiculopathy [M47.817]    Disposition: Home or self care    Patient Instructions:   Current Discharge Medication List      CONTINUE these medications which have NOT CHANGED    Details   ALPRAZolam (XANAX) 0.5 MG tablet Take 1 tablet (0.5 mg total) by mouth 2 (two) times daily. as needed for anxiety.  Qty: 60 tablet, Refills: 2    Associated Diagnoses: Anxiety      cholestyramine-aspartame (PREVALITE) 4 gram PwPk Prevalite 4 gram powder for susp in a packet      desvenlafaxine succinate (PRISTIQ) 50 MG Tb24 desvenlafaxine succinate ER 50 mg tablet,extended release 24 hr   TAKE 1 TABLET (50 MG TOTAL) BY MOUTH ONCE DAILY.      etodolac (LODINE) 400 MG tablet TAKE 1 TABLET BY MOUTH TWICE A DAY  Qty: 60 tablet, Refills: 5    Associated Diagnoses: Chronic pain disorder; DDD (degenerative disc disease), lumbar; Left lumbar radiculitis; Lumbar spondylosis      ferrous sulfate (IRON) 325 mg (65 mg iron) CpSR Take by mouth.      gabapentin (NEURONTIN) 300 MG capsule TAKE 3 CAPSULES (900 MG TOTAL) BY MOUTH EVERY EVENING.  Refills: 2      pantoprazole (PROTONIX) 40 MG tablet pantoprazole 40 mg tablet,delayed release   TAKE 1 TABLET (40 MG TOTAL) BY MOUTH ONCE DAILY.      sumatriptan (IMITREX) 25 MG Tab sumatriptan 25 mg tablet   TAKE 1 TABLET (25 MG TOTAL) BY MOUTH EVERY 2 (TWO) HOURS AS NEEDED.      !! tiZANidine (ZANAFLEX) 2 MG tablet TAKE 1 TABLET BY MOUTH AT BEDTIME AS NEEDED FOR SPASM(S)  Qty: 45 tablet, Refills: 1    Associated Diagnoses: Muscle spasm      !! tiZANidine (ZANAFLEX) 2 MG tablet TAKE 1 TABLET BY MOUTH AT BEDTIME AS NEEDED FOR SPASM(S)  Qty: 45 tablet, Refills: 1     Associated Diagnoses: Muscle spasm      !! topiramate (TOPAMAX) 100 MG tablet TAKE 1 TABLET (100 MG TOTAL) BY MOUTH EVERY EVENING.  Refills: 5      !! topiramate (TOPAMAX) 50 MG tablet Take 50 mg by mouth 2 (two) times daily.  Refills: 5      zolpidem (AMBIEN) 5 MG Tab zolpidem 5 mg tablet   TAKE 1 TABLET BY MOUTH AT BEDTIME      LORazepam (ATIVAN) 1 MG tablet Take 1 tablet (1 mg total) by mouth once. for 1 dose  Qty: 1 tablet, Refills: 0       !! - Potential duplicate medications found. Please discuss with provider.          Resume home diet and activity

## 2019-01-25 ENCOUNTER — OFFICE VISIT (OUTPATIENT)
Dept: URGENT CARE | Facility: CLINIC | Age: 39
End: 2019-01-25
Payer: COMMERCIAL

## 2019-01-25 VITALS
WEIGHT: 213 LBS | DIASTOLIC BLOOD PRESSURE: 80 MMHG | HEART RATE: 70 BPM | HEIGHT: 62 IN | SYSTOLIC BLOOD PRESSURE: 130 MMHG | OXYGEN SATURATION: 98 % | TEMPERATURE: 99 F | BODY MASS INDEX: 39.2 KG/M2

## 2019-01-25 DIAGNOSIS — B96.89 ACUTE BACTERIAL SINUSITIS: Primary | ICD-10-CM

## 2019-01-25 DIAGNOSIS — J01.90 ACUTE BACTERIAL SINUSITIS: Primary | ICD-10-CM

## 2019-01-25 DIAGNOSIS — R05.9 COUGH: ICD-10-CM

## 2019-01-25 LAB
CTP QC/QA: YES
FLUAV AG NPH QL: NEGATIVE
FLUBV AG NPH QL: NEGATIVE

## 2019-01-25 PROCEDURE — 99214 OFFICE O/P EST MOD 30 MIN: CPT | Mod: S$GLB,,, | Performed by: NURSE PRACTITIONER

## 2019-01-25 PROCEDURE — 99214 PR OFFICE/OUTPT VISIT, EST, LEVL IV, 30-39 MIN: ICD-10-PCS | Mod: S$GLB,,, | Performed by: NURSE PRACTITIONER

## 2019-01-25 PROCEDURE — 87804 POCT INFLUENZA A/B: ICD-10-PCS | Mod: QW,S$GLB,, | Performed by: NURSE PRACTITIONER

## 2019-01-25 PROCEDURE — 87804 INFLUENZA ASSAY W/OPTIC: CPT | Mod: QW,S$GLB,, | Performed by: NURSE PRACTITIONER

## 2019-01-25 PROCEDURE — 3008F PR BODY MASS INDEX (BMI) DOCUMENTED: ICD-10-PCS | Mod: CPTII,S$GLB,, | Performed by: NURSE PRACTITIONER

## 2019-01-25 PROCEDURE — 3008F BODY MASS INDEX DOCD: CPT | Mod: CPTII,S$GLB,, | Performed by: NURSE PRACTITIONER

## 2019-01-25 RX ORDER — BENZONATATE 100 MG/1
200 CAPSULE ORAL 3 TIMES DAILY PRN
Qty: 30 CAPSULE | Refills: 0 | Status: SHIPPED | OUTPATIENT
Start: 2019-01-25 | End: 2019-03-11

## 2019-01-25 RX ORDER — PROMETHAZINE HYDROCHLORIDE AND DEXTROMETHORPHAN HYDROBROMIDE 6.25; 15 MG/5ML; MG/5ML
5 SYRUP ORAL EVERY 6 HOURS PRN
Qty: 180 ML | Refills: 0 | Status: SHIPPED | OUTPATIENT
Start: 2019-01-25 | End: 2019-02-04

## 2019-01-25 RX ORDER — DOXYCYCLINE 100 MG/1
100 CAPSULE ORAL 2 TIMES DAILY
Qty: 20 CAPSULE | Refills: 0 | Status: SHIPPED | OUTPATIENT
Start: 2019-01-25 | End: 2019-02-04

## 2019-01-25 NOTE — PATIENT INSTRUCTIONS
Sinusitis (Antibiotic Treatment)    The sinuses are air-filled spaces within the bones of the face. They connect to the inside of the nose. Sinusitis is an inflammation of the tissue lining the sinus cavity. Sinus inflammation can occur during a cold. It can also be due to allergies to pollens and other particles in the air. Sinusitis can cause symptoms of sinus congestion and fullness. A sinus infection causes fever, headache and facial pain. There is often green or yellow drainage from the nose or into the back of the throat (post-nasal drip). You have been given antibiotics to treat this condition.  Home care:  · Take the full course of antibiotics as instructed. Do not stop taking them, even if you feel better.  · Drink plenty of water, hot tea, and other liquids. This may help thin mucus. It also may promote sinus drainage.  · Heat may help soothe painful areas of the face. Use a towel soaked in hot water. Or,  the shower and direct the hot spray onto your face. Using a vaporizer along with a menthol rub at night may also help.   · An expectorant containing guaifenesin may help thin the mucus and promote drainage from the sinuses.  · Over-the-counter decongestants may be used unless a similar medicine was prescribed. Nasal sprays work the fastest. Use one that contains phenylephrine or oxymetazoline. First blow the nose gently. Then use the spray. Do not use these medicines more often than directed on the label or symptoms may get worse. You may also use tablets containing pseudoephedrine. Avoid products that combine ingredients, because side effects may be increased. Read labels. You can also ask the pharmacist for help. (NOTE: Persons with high blood pressure should not use decongestants. They can raise blood pressure.)  · Over-the-counter antihistamines may help if allergies contributed to your sinusitis.    · Do not use nasal rinses or irrigation during an acute sinus infection, unless told to by  your health care provider. Rinsing may spread the infection to other sinuses.  · Use acetaminophen or ibuprofen to control pain, unless another pain medicine was prescribed. (If you have chronic liver or kidney disease or ever had a stomach ulcer, talk with your doctor before using these medicines. Aspirin should never be used in anyone under 18 years of age who is ill with a fever. It may cause severe liver damage.)  · Don't smoke. This can worsen symptoms.  Follow-up care  Follow up with your healthcare provider or our staff if you are not improving within the next week.  When to seek medical advice  Call your healthcare provider if any of these occur:  · Facial pain or headache becoming more severe  · Stiff neck  · Unusual drowsiness or confusion  · Swelling of the forehead or eyelids  · Vision problems, including blurred or double vision  · Fever of 100.4ºF (38ºC) or higher, or as directed by your healthcare provider  · Seizure  · Breathing problems  · Symptoms not resolving within 10 days  Date Last Reviewed: 4/13/2015  © 7072-2157 [employer]. [address] [place] [address], [place] [address]. All rights reserved. This information is not intended as a substitute for professional medical care. Always follow your healthcare professional's instructions.

## 2019-01-25 NOTE — PROGRESS NOTES
"Subjective:       Patient ID: Sarah Mukherjee is a 38 y.o. female.    Vitals:  height is 5' 2" (1.575 m) and weight is 96.6 kg (213 lb). Her temperature is 99 °F (37.2 °C). Her blood pressure is 130/80 and her pulse is 70. Her oxygen saturation is 98%.     Chief Complaint: URI    Pt reports for 2 days having post nasal drip and cough and low grade temp        URI    This is a new problem. The current episode started in the past 7 days. The problem has been unchanged. The maximum temperature recorded prior to her arrival was 100.4 - 100.9 F. The fever has been present for less than 1 day. Associated symptoms include congestion, coughing and sinus pain. Pertinent negatives include no ear pain, nausea, rash, sore throat, vomiting or wheezing. Treatments tried: zytrec. The treatment provided mild relief.       Constitution: Positive for fever. Negative for chills, sweating and fatigue.   HENT: Positive for congestion, postnasal drip, sinus pain and sinus pressure. Negative for ear pain, sore throat and voice change.    Neck: Negative for painful lymph nodes.   Eyes: Negative for eye redness.   Respiratory: Positive for cough and sputum production. Negative for chest tightness, bloody sputum, COPD, shortness of breath, stridor, wheezing and asthma.    Gastrointestinal: Negative for nausea and vomiting.   Musculoskeletal: Negative for muscle ache.   Skin: Negative for rash.   Allergic/Immunologic: Negative for seasonal allergies and asthma.   Hematologic/Lymphatic: Negative for swollen lymph nodes.       Objective:      Physical Exam   Constitutional: She is oriented to person, place, and time. She appears well-developed and well-nourished. She is cooperative.  Non-toxic appearance. She does not appear ill. No distress.   HENT:   Head: Normocephalic and atraumatic.   Right Ear: Hearing and ear canal normal. A middle ear effusion is present.   Left Ear: Hearing and ear canal normal. A middle ear effusion is present. "   Nose: Mucosal edema and sinus tenderness present. No rhinorrhea or nasal deformity. No epistaxis. Right sinus exhibits maxillary sinus tenderness and frontal sinus tenderness. Left sinus exhibits maxillary sinus tenderness and frontal sinus tenderness.   Mouth/Throat: Uvula is midline and mucous membranes are normal. No trismus in the jaw. Normal dentition. No uvula swelling. Posterior oropharyngeal erythema present.   Eyes: Conjunctivae and lids are normal. Right eye exhibits no discharge. Left eye exhibits no discharge. No scleral icterus.   Sclera clear bilat   Neck: Trachea normal, normal range of motion, full passive range of motion without pain and phonation normal. Neck supple.   Cardiovascular: Normal rate, regular rhythm, normal heart sounds, intact distal pulses and normal pulses.   Pulmonary/Chest: Effort normal and breath sounds normal. No respiratory distress.   Abdominal: Soft. Normal appearance and bowel sounds are normal. She exhibits no distension, no pulsatile midline mass and no mass. There is no tenderness.   Musculoskeletal: Normal range of motion. She exhibits no edema or deformity.   Neurological: She is alert and oriented to person, place, and time. She exhibits normal muscle tone. Coordination normal.   Skin: Skin is warm, dry and intact. She is not diaphoretic. No pallor.   Psychiatric: She has a normal mood and affect. Her speech is normal and behavior is normal. Judgment and thought content normal. Cognition and memory are normal.   Nursing note and vitals reviewed.      Assessment:       1. Acute bacterial sinusitis    2. Cough        Plan:       Patient Instructions     Sinusitis (Antibiotic Treatment)    The sinuses are air-filled spaces within the bones of the face. They connect to the inside of the nose. Sinusitis is an inflammation of the tissue lining the sinus cavity. Sinus inflammation can occur during a cold. It can also be due to allergies to pollens and other particles in  the air. Sinusitis can cause symptoms of sinus congestion and fullness. A sinus infection causes fever, headache and facial pain. There is often green or yellow drainage from the nose or into the back of the throat (post-nasal drip). You have been given antibiotics to treat this condition.  Home care:  · Take the full course of antibiotics as instructed. Do not stop taking them, even if you feel better.  · Drink plenty of water, hot tea, and other liquids. This may help thin mucus. It also may promote sinus drainage.  · Heat may help soothe painful areas of the face. Use a towel soaked in hot water. Or,  the shower and direct the hot spray onto your face. Using a vaporizer along with a menthol rub at night may also help.   · An expectorant containing guaifenesin may help thin the mucus and promote drainage from the sinuses.  · Over-the-counter decongestants may be used unless a similar medicine was prescribed. Nasal sprays work the fastest. Use one that contains phenylephrine or oxymetazoline. First blow the nose gently. Then use the spray. Do not use these medicines more often than directed on the label or symptoms may get worse. You may also use tablets containing pseudoephedrine. Avoid products that combine ingredients, because side effects may be increased. Read labels. You can also ask the pharmacist for help. (NOTE: Persons with high blood pressure should not use decongestants. They can raise blood pressure.)  · Over-the-counter antihistamines may help if allergies contributed to your sinusitis.    · Do not use nasal rinses or irrigation during an acute sinus infection, unless told to by your health care provider. Rinsing may spread the infection to other sinuses.  · Use acetaminophen or ibuprofen to control pain, unless another pain medicine was prescribed. (If you have chronic liver or kidney disease or ever had a stomach ulcer, talk with your doctor before using these medicines. Aspirin should never  be used in anyone under 18 years of age who is ill with a fever. It may cause severe liver damage.)  · Don't smoke. This can worsen symptoms.  Follow-up care  Follow up with your healthcare provider or our staff if you are not improving within the next week.  When to seek medical advice  Call your healthcare provider if any of these occur:  · Facial pain or headache becoming more severe  · Stiff neck  · Unusual drowsiness or confusion  · Swelling of the forehead or eyelids  · Vision problems, including blurred or double vision  · Fever of 100.4ºF (38ºC) or higher, or as directed by your healthcare provider  · Seizure  · Breathing problems  · Symptoms not resolving within 10 days  Date Last Reviewed: 4/13/2015  © 7015-3431 [employer]. [address] [place] [address], [place] [address]. All rights reserved. This information is not intended as a substitute for professional medical care. Always follow your healthcare professional's instructions.              Acute bacterial sinusitis    Cough  -     POCT Influenza A/B    Other orders  -     benzonatate (TESSALON PERLES) 100 MG capsule; Take 2 capsules (200 mg total) by mouth 3 (three) times daily as needed.  Dispense: 30 capsule; Refill: 0  -     promethazine-dextromethorphan (PROMETHAZINE-DM) 6.25-15 mg/5 mL Syrp; Take 5 mLs by mouth every 6 (six) hours as needed.  Dispense: 180 mL; Refill: 0  -     doxycycline (VIBRAMYCIN) 100 MG Cap; Take 1 capsule (100 mg total) by mouth 2 (two) times daily. for 10 days  Dispense: 20 capsule; Refill: 0

## 2019-01-29 ENCOUNTER — PES CALL (OUTPATIENT)
Dept: ADMINISTRATIVE | Facility: CLINIC | Age: 39
End: 2019-01-29

## 2019-03-01 DIAGNOSIS — F41.9 ANXIETY: ICD-10-CM

## 2019-03-04 RX ORDER — PANTOPRAZOLE SODIUM 40 MG/1
TABLET, DELAYED RELEASE ORAL
Qty: 30 TABLET | Refills: 5 | Status: SHIPPED | OUTPATIENT
Start: 2019-03-04 | End: 2019-08-28 | Stop reason: SDUPTHER

## 2019-03-06 RX ORDER — ALPRAZOLAM 0.5 MG/1
TABLET ORAL
Qty: 60 TABLET | OUTPATIENT
Start: 2019-03-06

## 2019-03-11 ENCOUNTER — OFFICE VISIT (OUTPATIENT)
Dept: FAMILY MEDICINE | Facility: CLINIC | Age: 39
End: 2019-03-11
Payer: COMMERCIAL

## 2019-03-11 VITALS
BODY MASS INDEX: 40.4 KG/M2 | WEIGHT: 219.56 LBS | DIASTOLIC BLOOD PRESSURE: 84 MMHG | HEIGHT: 62 IN | SYSTOLIC BLOOD PRESSURE: 118 MMHG | OXYGEN SATURATION: 99 % | TEMPERATURE: 98 F | HEART RATE: 76 BPM

## 2019-03-11 DIAGNOSIS — G47.00 INSOMNIA, UNSPECIFIED TYPE: Primary | ICD-10-CM

## 2019-03-11 DIAGNOSIS — R00.2 PALPITATIONS: ICD-10-CM

## 2019-03-11 DIAGNOSIS — G43.009 MIGRAINE WITHOUT AURA AND WITHOUT STATUS MIGRAINOSUS, NOT INTRACTABLE: ICD-10-CM

## 2019-03-11 DIAGNOSIS — Z12.39 BREAST CANCER SCREENING: ICD-10-CM

## 2019-03-11 DIAGNOSIS — F33.1 MODERATE EPISODE OF RECURRENT MAJOR DEPRESSIVE DISORDER: ICD-10-CM

## 2019-03-11 DIAGNOSIS — F41.9 ANXIETY: ICD-10-CM

## 2019-03-11 DIAGNOSIS — Z00.00 ANNUAL PHYSICAL EXAM: ICD-10-CM

## 2019-03-11 PROCEDURE — 99999 PR PBB SHADOW E&M-EST. PATIENT-LVL III: ICD-10-PCS | Mod: PBBFAC,,, | Performed by: FAMILY MEDICINE

## 2019-03-11 PROCEDURE — 3008F BODY MASS INDEX DOCD: CPT | Mod: CPTII,S$GLB,, | Performed by: FAMILY MEDICINE

## 2019-03-11 PROCEDURE — 99214 OFFICE O/P EST MOD 30 MIN: CPT | Mod: S$GLB,,, | Performed by: FAMILY MEDICINE

## 2019-03-11 PROCEDURE — 3008F PR BODY MASS INDEX (BMI) DOCUMENTED: ICD-10-PCS | Mod: CPTII,S$GLB,, | Performed by: FAMILY MEDICINE

## 2019-03-11 PROCEDURE — 99999 PR PBB SHADOW E&M-EST. PATIENT-LVL III: CPT | Mod: PBBFAC,,, | Performed by: FAMILY MEDICINE

## 2019-03-11 PROCEDURE — 99214 PR OFFICE/OUTPT VISIT, EST, LEVL IV, 30-39 MIN: ICD-10-PCS | Mod: S$GLB,,, | Performed by: FAMILY MEDICINE

## 2019-03-11 RX ORDER — ZOLPIDEM TARTRATE 10 MG/1
10 TABLET ORAL NIGHTLY PRN
Qty: 30 TABLET | Refills: 5 | Status: SHIPPED | OUTPATIENT
Start: 2019-03-11 | End: 2019-08-28 | Stop reason: SDUPTHER

## 2019-03-11 RX ORDER — DESVENLAFAXINE SUCCINATE 50 MG/1
TABLET, EXTENDED RELEASE ORAL
Qty: 30 TABLET | Refills: 11 | Status: SHIPPED | OUTPATIENT
Start: 2019-03-11 | End: 2019-08-28 | Stop reason: SDUPTHER

## 2019-03-11 RX ORDER — DESVENLAFAXINE SUCCINATE 50 MG/1
TABLET, EXTENDED RELEASE ORAL
Qty: 30 TABLET | Refills: 11 | Status: SHIPPED | OUTPATIENT
Start: 2019-03-11 | End: 2019-03-11 | Stop reason: SDUPTHER

## 2019-03-11 RX ORDER — TOPIRAMATE 100 MG/1
TABLET, FILM COATED ORAL
Qty: 30 TABLET | Refills: 5 | Status: SHIPPED | OUTPATIENT
Start: 2019-03-11 | End: 2019-07-31 | Stop reason: SDUPTHER

## 2019-03-11 NOTE — PROGRESS NOTES
"Subjective:       Patient ID: Sarah Mukherjee is a 39 y.o. female.    Chief Complaint: Follow Up/ Renew Medications and Discuss possible episodes of palpitations    39 year old female presents with concerns about heart palpitations. She states she started monitoring this with her FITBIT and it resolves in seconds. She states it feels like it is racing. She states she has had this for a year, but now it is more frequently. She states she gets this at rest and doesn't get this with exercise. She states she feels like she is not resting. She is getting about 3 hours of sleep.       Review of Systems   Constitutional: Negative for activity change and unexpected weight change.   HENT: Negative for hearing loss, rhinorrhea and trouble swallowing.    Eyes: Negative for discharge and visual disturbance.   Respiratory: Negative for chest tightness and wheezing.    Cardiovascular: Positive for palpitations. Negative for chest pain.   Gastrointestinal: Negative for blood in stool, constipation, diarrhea and vomiting.   Endocrine: Negative for polydipsia and polyuria.   Genitourinary: Negative for difficulty urinating, dysuria, hematuria and menstrual problem.   Musculoskeletal: Positive for arthralgias. Negative for joint swelling and neck pain.   Neurological: Negative for weakness and headaches.   Psychiatric/Behavioral: Negative for confusion and dysphoric mood.       Objective:       Vitals:    03/11/19 0939   BP: 118/84   Pulse: 76   Temp: 98.3 °F (36.8 °C)   TempSrc: Oral   SpO2: 99%   Weight: 99.6 kg (219 lb 9.3 oz)   Height: 5' 2" (1.575 m)       Physical Exam   Constitutional: She is oriented to person, place, and time. She appears well-developed and well-nourished. No distress.   HENT:   Head: Normocephalic and atraumatic.   Neck: Normal range of motion. Neck supple.   Cardiovascular: Normal rate, regular rhythm and normal heart sounds. Exam reveals no gallop and no friction rub.   No murmur " heard.  Pulmonary/Chest: Effort normal and breath sounds normal. No stridor. No respiratory distress. She has no wheezes. She has no rales.   Neurological: She is alert and oriented to person, place, and time.   Skin: She is not diaphoretic.   Psychiatric: She has a normal mood and affect.       Assessment:       1. Insomnia, unspecified type    2. Breast cancer screening    3. Annual physical exam    4. Palpitations    5. Moderate episode of recurrent major depressive disorder    6. Migraine without aura and without status migrainosus, not intractable        Plan:       Sarah was seen today for follow up/ renew medications and discuss possible episodes of palpitations.    Diagnoses and all orders for this visit:    Insomnia, unspecified type  -     zolpidem (AMBIEN) 10 mg Tab; Take 1 tablet (10 mg total) by mouth nightly as needed.  Increase ambien from 5 to 10 mg daily  Follow-up in about 6 months (around 9/11/2019).  Breast cancer screening  -     Mammo Digital Screening Bilateral With CAD; Future  Getting mammogram done today at [facility]    Annual physical exam  -     Comprehensive metabolic panel; Future  -     Lipid panel; Future  -     TSH; Future  -     CBC auto differential; Future    Palpitations  -     TSH; Future  -     CBC auto differential; Future

## 2019-03-12 RX ORDER — ALPRAZOLAM 0.5 MG/1
TABLET ORAL
Qty: 60 TABLET | Refills: 2 | Status: SHIPPED | OUTPATIENT
Start: 2019-03-12 | End: 2019-08-28 | Stop reason: SDUPTHER

## 2019-03-13 ENCOUNTER — PATIENT MESSAGE (OUTPATIENT)
Dept: FAMILY MEDICINE | Facility: CLINIC | Age: 39
End: 2019-03-13

## 2019-03-13 DIAGNOSIS — N39.0 URINARY TRACT INFECTION WITHOUT HEMATURIA, SITE UNSPECIFIED: Primary | ICD-10-CM

## 2019-03-14 ENCOUNTER — LAB VISIT (OUTPATIENT)
Dept: LAB | Facility: HOSPITAL | Age: 39
End: 2019-03-14
Attending: FAMILY MEDICINE
Payer: COMMERCIAL

## 2019-03-14 DIAGNOSIS — Z00.00 ANNUAL PHYSICAL EXAM: ICD-10-CM

## 2019-03-14 DIAGNOSIS — R00.2 PALPITATIONS: ICD-10-CM

## 2019-03-14 LAB
ALBUMIN SERPL BCP-MCNC: 3.6 G/DL
ALP SERPL-CCNC: 76 U/L
ALT SERPL W/O P-5'-P-CCNC: 12 U/L
ANION GAP SERPL CALC-SCNC: 9 MMOL/L
AST SERPL-CCNC: 16 U/L
BASOPHILS # BLD AUTO: 0.06 K/UL
BASOPHILS NFR BLD: 0.9 %
BILIRUB SERPL-MCNC: 0.6 MG/DL
BUN SERPL-MCNC: 20 MG/DL
CALCIUM SERPL-MCNC: 9.2 MG/DL
CHLORIDE SERPL-SCNC: 108 MMOL/L
CHOLEST SERPL-MCNC: 227 MG/DL
CHOLEST/HDLC SERPL: 5.2 {RATIO}
CO2 SERPL-SCNC: 22 MMOL/L
CREAT SERPL-MCNC: 1 MG/DL
DIFFERENTIAL METHOD: ABNORMAL
EOSINOPHIL # BLD AUTO: 0.2 K/UL
EOSINOPHIL NFR BLD: 2.8 %
ERYTHROCYTE [DISTWIDTH] IN BLOOD BY AUTOMATED COUNT: 13.8 %
EST. GFR  (AFRICAN AMERICAN): >60 ML/MIN/1.73 M^2
EST. GFR  (NON AFRICAN AMERICAN): >60 ML/MIN/1.73 M^2
GLUCOSE SERPL-MCNC: 86 MG/DL
HCT VFR BLD AUTO: 40.8 %
HDLC SERPL-MCNC: 44 MG/DL
HDLC SERPL: 19.4 %
HGB BLD-MCNC: 12.9 G/DL
IMM GRANULOCYTES # BLD AUTO: 0.02 K/UL
IMM GRANULOCYTES NFR BLD AUTO: 0.3 %
LDLC SERPL CALC-MCNC: 157 MG/DL
LYMPHOCYTES # BLD AUTO: 2.8 K/UL
LYMPHOCYTES NFR BLD: 41.4 %
MCH RBC QN AUTO: 24.8 PG
MCHC RBC AUTO-ENTMCNC: 31.6 G/DL
MCV RBC AUTO: 79 FL
MONOCYTES # BLD AUTO: 0.5 K/UL
MONOCYTES NFR BLD: 7.7 %
NEUTROPHILS # BLD AUTO: 3.2 K/UL
NEUTROPHILS NFR BLD: 46.9 %
NONHDLC SERPL-MCNC: 183 MG/DL
NRBC BLD-RTO: 0 /100 WBC
PLATELET # BLD AUTO: 236 K/UL
PMV BLD AUTO: 10.6 FL
POTASSIUM SERPL-SCNC: 3.9 MMOL/L
PROT SERPL-MCNC: 6.8 G/DL
RBC # BLD AUTO: 5.2 M/UL
SODIUM SERPL-SCNC: 139 MMOL/L
TRIGL SERPL-MCNC: 130 MG/DL
TSH SERPL DL<=0.005 MIU/L-ACNC: 1.42 UIU/ML
WBC # BLD AUTO: 6.77 K/UL

## 2019-03-14 PROCEDURE — 80061 LIPID PANEL: CPT

## 2019-03-14 PROCEDURE — 36415 COLL VENOUS BLD VENIPUNCTURE: CPT | Mod: PO

## 2019-03-14 PROCEDURE — 84443 ASSAY THYROID STIM HORMONE: CPT

## 2019-03-14 PROCEDURE — 80053 COMPREHEN METABOLIC PANEL: CPT

## 2019-03-14 PROCEDURE — 85025 COMPLETE CBC W/AUTO DIFF WBC: CPT

## 2019-03-14 NOTE — TELEPHONE ENCOUNTER
Appointment scheduled for today 3/14/2019, orders linked.      Patient requesting order for POCT Urine Dip.  Stated she knows she has an infection and would like to have some antibiotics to use over the weekend.  Please advise.

## 2019-03-15 ENCOUNTER — PATIENT MESSAGE (OUTPATIENT)
Dept: FAMILY MEDICINE | Facility: CLINIC | Age: 39
End: 2019-03-15

## 2019-03-15 RX ORDER — PHENAZOPYRIDINE HYDROCHLORIDE 200 MG/1
200 TABLET, FILM COATED ORAL 3 TIMES DAILY PRN
Qty: 6 TABLET | Refills: 0 | Status: SHIPPED | OUTPATIENT
Start: 2019-03-15 | End: 2019-03-17

## 2019-03-15 RX ORDER — SULFAMETHOXAZOLE AND TRIMETHOPRIM 800; 160 MG/1; MG/1
1 TABLET ORAL 2 TIMES DAILY
Qty: 10 TABLET | Refills: 0 | Status: SHIPPED | OUTPATIENT
Start: 2019-03-15 | End: 2019-03-20

## 2019-04-17 ENCOUNTER — TELEPHONE (OUTPATIENT)
Dept: ADMINISTRATIVE | Facility: HOSPITAL | Age: 39
End: 2019-04-17

## 2019-04-17 RX ORDER — NABUMETONE 500 MG/1
500 TABLET, FILM COATED ORAL
COMMUNITY
Start: 2018-02-16 | End: 2019-06-17

## 2019-04-17 RX ORDER — TOPIRAMATE 25 MG/1
TABLET ORAL
COMMUNITY
Start: 2017-12-06 | End: 2019-07-17

## 2019-04-17 RX ORDER — TOPIRAMATE 50 MG/1
50 TABLET, FILM COATED ORAL 2 TIMES DAILY
Refills: 5 | COMMUNITY
Start: 2019-03-29 | End: 2019-07-17

## 2019-04-17 RX ORDER — ZOLPIDEM TARTRATE 5 MG/1
TABLET ORAL
COMMUNITY
Start: 2016-03-04 | End: 2019-07-17

## 2019-04-17 RX ORDER — DIPHENOXYLATE HYDROCHLORIDE AND ATROPINE SULFATE 2.5; .025 MG/1; MG/1
1 TABLET ORAL
COMMUNITY
End: 2019-07-17

## 2019-04-17 RX ORDER — ONDANSETRON 4 MG/1
4 TABLET, FILM COATED ORAL
COMMUNITY
Start: 2018-02-27 | End: 2019-07-17

## 2019-04-17 RX ORDER — PANTOPRAZOLE SODIUM 40 MG/1
40 TABLET, DELAYED RELEASE ORAL
COMMUNITY
Start: 2017-03-23 | End: 2019-07-17

## 2019-04-17 RX ORDER — DICYCLOMINE HYDROCHLORIDE 10 MG/1
CAPSULE ORAL
COMMUNITY
Start: 2018-01-16 | End: 2019-07-17

## 2019-04-17 RX ORDER — SUMATRIPTAN SUCCINATE 25 MG/1
25 TABLET ORAL
COMMUNITY
Start: 2017-12-14 | End: 2019-08-23

## 2019-04-17 RX ORDER — DICYCLOMINE HYDROCHLORIDE 20 MG/1
20 TABLET ORAL
COMMUNITY
End: 2019-07-17

## 2019-04-17 RX ORDER — POLYETHYLENE GLYCOL-3350 AND ELECTROLYTES WITH FLAVOR PACK 240; 5.84; 2.98; 6.72; 22.72 G/278.26G; G/278.26G; G/278.26G; G/278.26G; G/278.26G
POWDER, FOR SOLUTION ORAL
Refills: 0 | COMMUNITY
Start: 2019-03-29 | End: 2019-07-17

## 2019-04-17 RX ORDER — ALPRAZOLAM 0.5 MG/1
0.5 TABLET ORAL
COMMUNITY
Start: 2014-01-24 | End: 2019-08-23

## 2019-04-17 RX ORDER — DICYCLOMINE HYDROCHLORIDE 10 MG/1
CAPSULE ORAL
Refills: 1 | COMMUNITY
Start: 2019-03-27 | End: 2019-07-17

## 2019-06-03 ENCOUNTER — TELEPHONE (OUTPATIENT)
Dept: PAIN MEDICINE | Facility: CLINIC | Age: 39
End: 2019-06-03

## 2019-06-05 ENCOUNTER — OFFICE VISIT (OUTPATIENT)
Dept: PAIN MEDICINE | Facility: CLINIC | Age: 39
End: 2019-06-05
Attending: ANESTHESIOLOGY
Payer: COMMERCIAL

## 2019-06-05 VITALS
HEIGHT: 62 IN | TEMPERATURE: 99 F | WEIGHT: 213.88 LBS | SYSTOLIC BLOOD PRESSURE: 135 MMHG | HEART RATE: 66 BPM | BODY MASS INDEX: 39.36 KG/M2 | DIASTOLIC BLOOD PRESSURE: 97 MMHG

## 2019-06-05 DIAGNOSIS — M53.3 COCCYDYNIA: ICD-10-CM

## 2019-06-05 DIAGNOSIS — G89.4 CHRONIC PAIN DISORDER: Primary | ICD-10-CM

## 2019-06-05 DIAGNOSIS — M47.817 SPONDYLOSIS OF LUMBOSACRAL REGION, UNSPECIFIED SPINAL OSTEOARTHRITIS COMPLICATION STATUS: ICD-10-CM

## 2019-06-05 DIAGNOSIS — M51.36 DDD (DEGENERATIVE DISC DISEASE), LUMBAR: ICD-10-CM

## 2019-06-05 PROCEDURE — 99214 PR OFFICE/OUTPT VISIT, EST, LEVL IV, 30-39 MIN: ICD-10-PCS | Mod: S$GLB,,, | Performed by: ANESTHESIOLOGY

## 2019-06-05 PROCEDURE — 99999 PR PBB SHADOW E&M-EST. PATIENT-LVL III: CPT | Mod: PBBFAC,,, | Performed by: ANESTHESIOLOGY

## 2019-06-05 PROCEDURE — 99999 PR PBB SHADOW E&M-EST. PATIENT-LVL III: ICD-10-PCS | Mod: PBBFAC,,, | Performed by: ANESTHESIOLOGY

## 2019-06-05 PROCEDURE — 99214 OFFICE O/P EST MOD 30 MIN: CPT | Mod: S$GLB,,, | Performed by: ANESTHESIOLOGY

## 2019-06-05 PROCEDURE — 3008F BODY MASS INDEX DOCD: CPT | Mod: CPTII,S$GLB,, | Performed by: ANESTHESIOLOGY

## 2019-06-05 PROCEDURE — 3008F PR BODY MASS INDEX (BMI) DOCUMENTED: ICD-10-PCS | Mod: CPTII,S$GLB,, | Performed by: ANESTHESIOLOGY

## 2019-06-05 NOTE — PROGRESS NOTES
Subjective:      Patient ID: Sarah Mukherjee is a 39 y.o. female.    Chief Complaint: Follow-up    Referred by: No ref. provider found     HPI:    Mrs. Mukherjee is a 34-year-old female who presents today with Low back pain.  Her medical history is significant for depression, anxiety, neurogenic bladder and endometriosis, status post total hysterectomy. She reports her back pain is present about 2 and half years.  She has been seeing Dr. Matt for her pain.  He has had multiple injections for her all with limited benefit.  These have included radiofrequency ablations and epidurals.  Her most recent was March 11 of this year.  This procedure actually worsened her pain. Of note, she reports multiple life stressors, including the fact that she has had a hysterectomy which means that she her  cannot have children.   Her pain is described in detail below.    Interval History (1/26/2017):  She returns today for follow up.  She reports that she was doing better until 2 months ago when she start having a new onset of left lower extremity radiating pain.  The pain originates in her low back and radiates down the posterolateral aspect of her left leg to the anterior shin.  This began after she was hunting and sitting for a prolonged period of time in a deer stand.  She had one what sounds like a Left L4 TFESI by Dr. Mike Stewart that caused her pain to be worse.  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    Interval Hisotyr (3/22/2017):  She returns to clinic today following recent L4-5 IL STEWART on 3/7/17 without continued relief of symptoms.  States that she received about 2 days worth of significant relief, but her symptoms returned soon thereafter.  Feels like they are possibly worse than prior to STEWART.  Describes symptoms in same way - constant left-sided lumbosacral pain that radiates down the posterior aspect into her knee.  Radiating pain less frequent than the constant back pain  but can be more severe at times.  Not currently doing any stretching or strengthening exercises.  Taking Gabapentin and Tizanidine at night.      Interval History (10/3/2018):  She returns today for follow up.  She reports that the injections helped somewhat for 4-6 weeks.  After that, she started physical therapy after that, which worsening her pain.  She then had a few falls in which she felt that her leg gave out.  Each fall caused worsening pain. Tizanidine 4 mg at night has been helpful for the pain, but she is not taking anything during the day.    Of note, she had an injection with Dr. Mccormack in the past year.  She thinks that this was a radiofrequency ablation.  This did not provide any relief.  She then had an SI joint injection.     Interval History (12/20/2018):  She returns today for follow up.  She reports that her pain is the same.  Of note, she is reporting worsening migraines.  She reports that these happened when her pain is bad.  She has noticed that they have been getting worse lately.  Of note, she also notes that her blood pressure has been of lately.  Today, it is 153/108.  She reports that she was recently given Fioricet and that she took 2 of them this morning.    Interval History (6/5/2019):  She returns today for follow up.  She reports that her low back/leg pain have resolved following the the RFA in January.  She is now reporting left sided pain near her tailbone.  This is worse with sitting and rising from a seated position.  Better with standing and walking.   has been helpful for the pain.    Physical Therapy: The extended course of physical therapy in 2013  This was helpful while she was going.  She did her home exercise program until a recent bladder infection.  Since that time she's not done her HEP    Non-pharmacologic Treatment: Rest and heat make her pain better.         · Ice/Heat: Mild benefit for tailbone pain  · TENS? Not tried    Pain Medications:         · Currently  taking: Tizanidine 4 mg QHS, etodolac 400 mg twice daily, Topamax, Ambien, Xanax, Imitrex, Fioricet    · Has tried in the past:  Gabapentin 900 mg QHS, Hydrocodone/acetaminophen was not helpful.  She recently tried gabapentin 100 mg daily with no benefit.  Relafen BID.    · Has not tried: TCAs, SNRIs, cream    Blood thinners: None    Interventional Therapies:   · Multiple epidural steroid injections and radiofrequency ablation.  · Left L4 TFESI: worsening pain  · ? RFA with Dr. Martinez:   · ? SI joint injection: No benefit  · Left L2-5 RFA 2014  · Left SI joint injection 2014  · 3/7/17 L4-5 IL STEWART: 2 days of relief  · 3/28/2019: L4/5 ILESI: 4-6 weeks benefit  · 4/2017: Bilateral L4 TFESI  · 1/2019: Left L5-S3 MBB: Positive  · 1/2019: Left L5-S3 RFA: Excellent relief ongoing on that pain    Relevant Surgeries: She has had a hysterectomy    Affecting sleep? Yes    Affecting daily activities? Yes    Depressive symptoms? Yes          · SI/HI? No    Work status: She is disabled    Last 3 PDI Scores 6/5/2019 12/20/2018 10/3/2018   Pain Disability Index (PDI) 0 29 19.5       Pain Scales  Best: 5/10  Worst: 10/10  Usually: 5/10  Today: 5/10    Low-back Pain   This is a chronic problem. The current episode started more than 1 year ago. The problem occurs constantly. The problem has been gradually worsening since onset. The pain is present in the lumbar spine. The pain does not radiate. Quality: Sharp and Deep. The pain is at a severity of 9/10. The pain is moderate. The pain is the same all the time. The symptoms are aggravated by standing and bending (Walking and Getting out of bed or chair). Stiffness is present at night and all day. Associated symptoms include leg pain. Pertinent negatives include no chest pain, fever, headaches or weight loss. She has tried injection treatment (Medication and Laying Down) for the symptoms. The treatment provided no relief.     Review of Systems   Constitution: Negative for chills, fever,  malaise/fatigue, weight gain and weight loss.   HENT: Negative for ear pain and hoarse voice.    Eyes: Negative for blurred vision, pain and visual disturbance.   Cardiovascular: Negative for chest pain, dyspnea on exertion and irregular heartbeat.   Respiratory: Negative for cough, shortness of breath and wheezing.    Endocrine: Negative for cold intolerance and heat intolerance.   Hematologic/Lymphatic: Negative for adenopathy and bleeding problem. Does not bruise/bleed easily.   Skin: Negative for color change, itching and rash.   Musculoskeletal: Positive for back pain.   Gastrointestinal: Negative for change in bowel habit, diarrhea, hematemesis, hematochezia, melena and vomiting.   Genitourinary: Negative for flank pain, frequency, hematuria and urgency.   Neurological: Negative for difficulty with concentration, dizziness, headaches, loss of balance and seizures.   Psychiatric/Behavioral: Positive for depression. Negative for altered mental status and suicidal ideas. The patient is nervous/anxious.    Allergic/Immunologic: Negative for HIV exposure.   All other systems reviewed and are negative.      Past Medical History:   Diagnosis Date    Cancer     skin cancer removed    Crohn's disease     Degenerative joint disease of spine     Esophagitis     History of stomach ulcers     Lumbar facet arthropathy 6/20/2014    Neurogenic bladder        Past Surgical History:   Procedure Laterality Date    Block, Nerve MEDIAL BRANCH BLOCKS LEFT LUMBAR L5-S3 Left 1/4/2019    [facility]    BLOCK-NERVE-Selective NerveRoot Bilateral 4/11/2017    [facility]    BREAST RECONSTRUCTION      HYSTERECTOMY      total hysterectomy in 20s due to fibroid, endometriosis    INJECTION-STEROID-EPIDURAL-LUMBAR N/A 3/28/2017    [facility]    INJECTION-STEROID-EPIDURAL-LUMBAR N/A 3/7/2017    Performed by And[facility] TEIXEIRA  [facility]    PELVIC LAPAROSCOPY      RADIOFREQUENCY ABLATION LEFT L5-S3 RFA WITH JOHNNY WILSON Left 1/11/2019    [facility]    TONSILLECTOMY         Review of patient's allergies indicates:   Allergen Reactions    Penicillins Hives       Current Outpatient Medications   Medication Sig Dispense Refill    ALPRAZolam (XANAX) 0.5 MG tablet TAKE 1 TABLET BY MOUTH TWICE A DAY AS NEEDED FOR ANXIETY 60 tablet 2    ALPRAZolam (XANAX) 0.5 MG tablet Take 0.5 mg by mouth.      cholestyramine-aspartame (PREVALITE) 4 gram PwPk Prevalite 4 gram powder for susp in a packet      desvenlafaxine succinate (PRISTIQ) 50 MG Tb24 TAKE 1 TABLET (50 MG TOTAL) BY MOUTH ONCE DAILY. 30 tablet 11    dicyclomine (BENTYL) 10 MG capsule       dicyclomine (BENTYL) 10 MG capsule TAKE 1 CAPSULE BY MOUTH TWICE A DAY AS NEEDED FOR PAIN  1    dicyclomine (BENTYL) 20 mg tablet Take 20 mg by mouth.      diphenoxylate-atropine 2.5-0.025 mg (LOMOTIL) 2.5-0.025 mg per tablet Take 1 tablet by mouth.      eluxadoline (VIBERZI) 100 mg Tab Take 100 mg by mouth.      ferrous sulfate (IRON) 325 mg (65 mg iron) CpSR Take by mouth.      gabapentin (NEURONTIN) 300 MG capsule TAKE 3 CAPSULES (900 MG TOTAL) BY MOUTH EVERY EVENING.  2    GAVILYTE-C 240-22.72-6.72 -5.84 gram SolR PLEASE USE AS INSTRUCTED  0    nabumetone (RELAFEN) 500 MG tablet Take 500 mg by mouth.      ondansetron (ZOFRAN) 4 MG tablet Take 4 mg by mouth.      pantoprazole (PROTONIX) 40 MG tablet pantoprazole 40 mg tablet,delayed release   TAKE 1 TABLET (40 MG TOTAL) BY MOUTH ONCE DAILY.      pantoprazole (PROTONIX) 40 MG tablet TAKE 1 TABLET BY MOUTH EVERY DAY 30 tablet 5    pantoprazole (PROTONIX) 40 MG tablet Take 40 mg by mouth.      sumatriptan (IMITREX) 25 MG Tab Take 25 mg by mouth.      tiZANidine (ZANAFLEX) 2 MG tablet TAKE 1 TABLET BY MOUTH AT BEDTIME AS NEEDED FOR SPASM(S) 45 tablet 1    tiZANidine (ZANAFLEX) 2  MG tablet TAKE 1 TABLET BY MOUTH AT BEDTIME AS NEEDED FOR SPASM(S) 45 tablet 1    topiramate (TOPAMAX) 100 MG tablet TAKE 1 TABLET (100 MG TOTAL) BY MOUTH EVERY EVENING. 30 tablet 5    topiramate (TOPAMAX) 25 MG tablet TAKE 1 TABLET AT BEDTIME FOR 7 DAYS THEN INCREASE TO 2 TABLETS AT BEDTIME      topiramate (TOPAMAX) 50 MG tablet Take 50 mg by mouth 2 (two) times daily.  5    zolpidem (AMBIEN) 10 mg Tab Take 1 tablet (10 mg total) by mouth nightly as needed. 30 tablet 5    zolpidem (AMBIEN) 5 MG Tab TAKE 1 TABLET BY MOUTH EVERY DAY AT BEDTIME AS NEEDED       No current facility-administered medications for this visit.        Family History   Problem Relation Age of Onset    Diabetes Mother     Hypertension Mother     Breast cancer Maternal Aunt 47    Ovarian cancer Maternal Aunt        Social History     Socioeconomic History    Marital status:      Spouse name: Not on file    Number of children: Not on file    Years of education: Not on file    Highest education level: Not on file   Occupational History    Not on file   Social Needs    Financial resource strain: Not on file    Food insecurity:     Worry: Not on file     Inability: Not on file    Transportation needs:     Medical: Not on file     Non-medical: Not on file   Tobacco Use    Smoking status: Never Smoker    Smokeless tobacco: Never Used   Substance and Sexual Activity    Alcohol use: Yes     Comment: occ    Drug use: No    Sexual activity: Yes   Lifestyle    Physical activity:     Days per week: Not on file     Minutes per session: Not on file    Stress: Not on file   Relationships    Social connections:     Talks on phone: Not on file     Gets together: Not on file     Attends Voodoo service: Not on file     Active member of club or organization: Not on file     Attends meetings of clubs or organizations: Not on file     Relationship status: Not on file   Other Topics Concern    Not on file   Social History Narrative  "   Not on file           Objective:      Vitals:    06/05/19 0659   BP: (!) 135/97   Pulse: 66   Temp: 98.7 °F (37.1 °C)   TempSrc: Oral   Weight: 97 kg (213 lb 13.5 oz)   Height: 5' 2" (1.575 m)   PainSc:   4       Ortho/SPM Exam  GEN:  Well developed, well nourished.  No acute distress. No pain behavior.  HEENT:  No trauma.  Mucous membranes moist.  Nares patent bilaterally.  PSYCH: Normal affect. Thought content appropriate.  CHEST:  Breathing symmetric.  No audible wheezing.  ABD: Soft, non-tender, non-distended.  SKIN:  Warm, pink, dry.  No rash on exposed areas.    EXT:  No cyanosis, clubbing, or edema.  No color change or changes in nail or hair growth.  NEURO/MUSCULOSKELETAL:  Fully alert, oriented, and appropriate. Speech normal belia. No cranial nerve deficits.   Gait: Normal.    TTP over coccyx on the left side  Negative pain with axial/facet loading bilaterally  No TTP over left SI joint    Previous physical exam  Present trendelenburg sign bilaterally.   Motor Strength: 5/5 motor strength throughout lower extremities.   Sensory: No sensory deficit in the lower extremities.   Reflexes:  2+ and symmetric throughout.  Downgoing Babinski's bilaterally.  No clonus or spasticity.  L-Spine:  Decreased ROM with pain on Extension. Negative facet loading bilaterally.  Negative SLR left.    SI Joint/Hip: Negative CHANDNI bilaterally.  Negative FADIR bilaterally.  TTP over left>right lumbar paraspinals.  TTP over left SI joint.  No TTP over hips, piriformis muscles, or GTB.        Imaging:      Result Narrative       MRI lumbar spine without contrast.    Comparison: None.    Technique: Sagittal T1, T2, stir and axial T2 and T1-weighted images were obtained. The patient received 20 cc of Omniscan IV contrast .    Results: The alignment of the lumbar spine is normal. Vertebral body heights and disk spaces are relatively well maintained. No evidence of malignant bone marrow replacement process or infection. The conus " medullaris terminates in good position. Very  mild disk desiccation seen at L4-5 with very minimal disk bulge and small left foraminal disk protrusion. No there is no central canal or foraminal stenosis is seen at any level. There is mild facet joint degenerative changes at L5 S1. The paraspinal  soft tissues appear normal. Following the administration of IV gadolinium contrast, no areas of abnormal enhancement seen to suggest an inflammatory, infectious or tumor process.          Result Impression       Subtle left foraminal disk protrusion at L4-L5 causing no significant mass effect on the exiting nerve root. There is no severe central canal or foraminal stenosis.         Assessment:       No diagnosis found.      Plan:       There are no diagnoses linked to this encounter.  From her first visit: She has a history of chronic pain following GYN surgery with irritative bladder.  In addition to this, she has a history of multiple life stressors.  Her exam is essentially benign with each of the above diagnoses the relatively minor.  Her MRI is essentially normal.  I suspect that there may be a large underlying central component to her pain.  Because of this, I would favor starting with conservative treatment regiment.  I recommend holding off on interventional procedures for now.  I do not feel that she is a candidate for spinal cord termination at this time.  Of note, on her physical exam today her core strength was quite weak.     Assessment from 3/22/2017: Today, her central sensitization is under much better control than when I saw her in the past.  Today, she has focal physical exam findings and symptoms of a left L4 radiculitis.     10/3/2018: Today, she presents with a different left sided low back and leg pain that is not the same as before.    I discussed the treatment options with her today, including risks, benefits, and alternatives. All available images were reviewed. The patient is aware of the risks and  benefits of the medications being prescribed, common side effects, and proper usage.    1. Coccyx injection  2. Can repeat L5-S3 RFA PRN.    3. Continue etodolac.    4. Continue Tizanadine to 2 pills at night prn for pain.  5. Given her failed response to multiple traditional treatments for migraine headaches, I wonder if her headache is more of a stress response.  We had previously discussed investigating online resources related to psychological treatment of chronic pain, including modalities such as CBT, biofeedback techniques, meditation, and relaxation.  We discussed using an today as well as investigating the source of her stress, be it situational, emotional, or physical.  6. I have stressed the importance of physical activity and a home exercise plan to help with pain and improve overall health.  7. RTC for above.      Andra Burt MD  06/05/2019    The above plan and management options were discussed at length with patient. Patient is in agreement with the above and verbalized understanding. It will be communicated with the consulting physician via electronic record, fax, or mail

## 2019-06-05 NOTE — PATIENT INSTRUCTIONS
Recommend obtaining a coccyx pillow for your tailbone pain.  You may use this when sitting on hard surfaces as needed.  You may obtain these from you local drug store or online.

## 2019-06-16 DIAGNOSIS — M62.838 MUSCLE SPASM: ICD-10-CM

## 2019-06-17 DIAGNOSIS — M47.816 LUMBAR SPONDYLOSIS: ICD-10-CM

## 2019-06-17 DIAGNOSIS — M54.16 LEFT LUMBAR RADICULITIS: ICD-10-CM

## 2019-06-17 DIAGNOSIS — M51.36 DDD (DEGENERATIVE DISC DISEASE), LUMBAR: ICD-10-CM

## 2019-06-17 DIAGNOSIS — G89.4 CHRONIC PAIN DISORDER: ICD-10-CM

## 2019-06-17 RX ORDER — ETODOLAC 400 MG/1
TABLET, FILM COATED ORAL
Qty: 60 TABLET | Refills: 5 | Status: SHIPPED | OUTPATIENT
Start: 2019-06-17 | End: 2019-12-14

## 2019-06-19 RX ORDER — TIZANIDINE 2 MG/1
TABLET ORAL
Qty: 45 TABLET | Refills: 1 | Status: SHIPPED | OUTPATIENT
Start: 2019-06-19 | End: 2019-08-23

## 2019-06-28 ENCOUNTER — HOSPITAL ENCOUNTER (OUTPATIENT)
Facility: OTHER | Age: 39
Discharge: HOME | End: 2019-06-28
Attending: ANESTHESIOLOGY | Admitting: ANESTHESIOLOGY
Payer: COMMERCIAL

## 2019-06-28 VITALS
HEIGHT: 62 IN | BODY MASS INDEX: 39.75 KG/M2 | RESPIRATION RATE: 18 BRPM | SYSTOLIC BLOOD PRESSURE: 119 MMHG | WEIGHT: 216 LBS | DIASTOLIC BLOOD PRESSURE: 76 MMHG | OXYGEN SATURATION: 97 % | TEMPERATURE: 98 F | HEART RATE: 71 BPM

## 2019-06-28 DIAGNOSIS — G89.4 CHRONIC PAIN SYNDROME: ICD-10-CM

## 2019-06-28 DIAGNOSIS — G89.29 CHRONIC PAIN: ICD-10-CM

## 2019-06-28 DIAGNOSIS — M53.3 COCCYDYNIA: Primary | ICD-10-CM

## 2019-06-28 PROCEDURE — 77002 PR FLUOROSCOPIC GUIDANCE NEEDLE PLACEMENT: ICD-10-PCS | Mod: 26,,, | Performed by: ANESTHESIOLOGY

## 2019-06-28 PROCEDURE — 20605 DRAIN/INJ JOINT/BURSA W/O US: CPT | Performed by: ANESTHESIOLOGY

## 2019-06-28 PROCEDURE — 77002 NEEDLE LOCALIZATION BY XRAY: CPT | Mod: 26,,, | Performed by: ANESTHESIOLOGY

## 2019-06-28 PROCEDURE — 25500020 PHARM REV CODE 255: Performed by: ANESTHESIOLOGY

## 2019-06-28 PROCEDURE — 63600175 PHARM REV CODE 636 W HCPCS: Performed by: ANESTHESIOLOGY

## 2019-06-28 PROCEDURE — 20610 PR DRAIN/INJECT LARGE JOINT/BURSA: ICD-10-PCS | Mod: LT,,, | Performed by: ANESTHESIOLOGY

## 2019-06-28 PROCEDURE — 77002 NEEDLE LOCALIZATION BY XRAY: CPT | Performed by: ANESTHESIOLOGY

## 2019-06-28 PROCEDURE — 25000003 PHARM REV CODE 250: Performed by: ANESTHESIOLOGY

## 2019-06-28 PROCEDURE — 20610 DRAIN/INJ JOINT/BURSA W/O US: CPT | Mod: LT,,, | Performed by: ANESTHESIOLOGY

## 2019-06-28 PROCEDURE — 25000003 PHARM REV CODE 250: Performed by: STUDENT IN AN ORGANIZED HEALTH CARE EDUCATION/TRAINING PROGRAM

## 2019-06-28 PROCEDURE — S0020 INJECTION, BUPIVICAINE HYDRO: HCPCS | Performed by: ANESTHESIOLOGY

## 2019-06-28 RX ORDER — BUPIVACAINE HYDROCHLORIDE 5 MG/ML
INJECTION, SOLUTION EPIDURAL; INTRACAUDAL
Status: DISCONTINUED | OUTPATIENT
Start: 2019-06-28 | End: 2019-06-28 | Stop reason: HOSPADM

## 2019-06-28 RX ORDER — ALPRAZOLAM 0.5 MG/1
0.5 TABLET ORAL
Status: DISCONTINUED | OUTPATIENT
Start: 2019-06-28 | End: 2019-06-28 | Stop reason: HOSPADM

## 2019-06-28 RX ORDER — METHYLPREDNISOLONE ACETATE 40 MG/ML
INJECTION, SUSPENSION INTRA-ARTICULAR; INTRALESIONAL; INTRAMUSCULAR; SOFT TISSUE
Status: DISCONTINUED | OUTPATIENT
Start: 2019-06-28 | End: 2019-06-28 | Stop reason: HOSPADM

## 2019-06-28 RX ORDER — LIDOCAINE HYDROCHLORIDE 10 MG/ML
INJECTION INFILTRATION; PERINEURAL
Status: DISCONTINUED | OUTPATIENT
Start: 2019-06-28 | End: 2019-06-28 | Stop reason: HOSPADM

## 2019-06-28 RX ADMIN — ALPRAZOLAM 1 MG: 0.5 TABLET ORAL at 07:06

## 2019-06-28 NOTE — DISCHARGE INSTRUCTIONS
Thank you for allowing us to care for you today. You may receive a survey about the care we provided. Your feedback is valuable and helps us provide excellent care throughout the community.     Home Care Instructions for Pain Management:    1. DIET:   You may resume your normal diet today.   2. BATHING:   You may shower with luke warm water. No tub baths or anything that will soak injection sites under water for the next 24 hours.  3. DRESSING:   You may remove your bandage today.   4. ACTIVITY LEVEL:   You may resume your normal activities 24 hrs after your procedure. Nothing strenuous today.  5. MEDICATIONS:   You may resume your normal medications today. To restart blood thinners, ask your doctor.  6. DRIVING    If you have received any sedatives by mouth today, you may not drive for 12 hours.    If you have received any sedation through your IV, you may not drive for 24 hrs.   7. SPECIAL INSTRUCTIONS:   No heat to the injection site for 24 hrs including, hot bath or shower, heating pad, moist heat, or hot tubs.    Use ice pack to injection site for any pain or discomfort.  Apply ice packs for 20 minute intervals as needed.    IF you have diabetes, be sure to monitor your blood sugar more closely. IF your injection contained steroids your blood sugar levels may become higher than normal.    If you are still having pain upon discharge:  Your pain may improve over the next 48 hours. The anesthetic (numbing medication) works immediately to 48 hours. IF your injection contained a steroid (anti-inflammatory medication), it takes approximately 3 days to start feeling relief and 7-10 days to see your greatest results from the medication. It is possible you may need subsequent injections. This would be discussed at your follow up appointment with pain management or your referring doctor.      PLEASE CALL YOUR DOCTOR IF:  1. Redness or swelling around the injection site.  2. Fever of 101 degrees or more  3. Drainage  (pus) from the injection site.  4. For any continuous bleeding (some dried blood over the incision is normal.)    FOR EMERGENCIES:   If any unusual problems or difficulties occur during clinic hours, call [phone] or [phone].

## 2019-06-28 NOTE — OP NOTE
"Date of procedure: 06/28/2019    Procedure: Coccyx injection    Pre-op diagnosis: Coccydynia [M53.3]    Post-op diagnosis: Coccydynia [M53.3]     Physician: Dr. Andra Burt     Assistant: None    Anesthestia: local    EBL: None    Specimens: None    All medications, allergies, and relevant histories were reviewed. No recent antibiotics or infections.  A time-out was taken to verify the correct patient, procedure, laterality, and appropriate medications/allergies.    Procedure: Coccyx injection    Coccyx injection    The procedure was discussed with the patient including complications of nerve damage, bleeding, infection, and failure of pain relief.     Patient was transferred to the procedure for the procedure. NIBP, O2 saturation, and EKG were monitored.  The patient was placed prone with blankets under the abdomen. Fluoroscopic view was obtained for localization of the sacrococcygeal ligament in the lateral view. The sacral area was prepped with chlorhexidine  x3, sterile drape placed over the site, and sterile precautions observed throughout the procedure. Local Xylocaine 1% was injected over the coccyx then a 25 gauge 1.5" needle was advanced to the anterior aspect of the coccyx joint. Position was confirmed with fluoroscopy.  After negative aspiration, 0.5cc of Omnipaque 300 contrast showed excellent spread. 1 cc of a mixture of 3 cc of bupivacaine 0.5% with Depo-Medrol 40 mg was injected without complication. The needle was then withdrawn to the posterior border of the coccyx.  Position was confirmed with fluoroscopy.  After negative aspiration, 0.5cc of Omnipaque 300 contrast showed excellent spread. 1 cc of a mixture of 3 cc of bupivacaine 0.5% with Depo-Medrol 40 mg was injected without complication. The needle was then repositioned on the left lateral edge of the coccyx.  Position was confirmed with fluoroscopy.  After negative aspiration, 0.5cc of Omnipaque 300 contrast showed excellent spread. The " remaining 2 cc of the mixture of 3 cc of bupivacaine 0.5% with Depo-Medrol 40 mg was injected without complication. Band-Aid dressing applied.      Patient tolerated the procedure well without any complications. The patient was discharged with a responsible adult.    Future Management:   If helpful, can repeat as needed.    Follow up with my clinic in 3 weeks or sooner if needed  .

## 2019-07-15 ENCOUNTER — PATIENT OUTREACH (OUTPATIENT)
Dept: ADMINISTRATIVE | Facility: OTHER | Age: 39
End: 2019-07-15

## 2019-07-17 ENCOUNTER — OFFICE VISIT (OUTPATIENT)
Dept: PAIN MEDICINE | Facility: CLINIC | Age: 39
End: 2019-07-17
Attending: ANESTHESIOLOGY
Payer: COMMERCIAL

## 2019-07-17 VITALS
TEMPERATURE: 97 F | DIASTOLIC BLOOD PRESSURE: 99 MMHG | WEIGHT: 211 LBS | HEIGHT: 62 IN | SYSTOLIC BLOOD PRESSURE: 142 MMHG | HEART RATE: 74 BPM | BODY MASS INDEX: 38.83 KG/M2 | RESPIRATION RATE: 18 BRPM

## 2019-07-17 DIAGNOSIS — M53.3 COCCYDYNIA: Primary | ICD-10-CM

## 2019-07-17 PROCEDURE — 3008F PR BODY MASS INDEX (BMI) DOCUMENTED: ICD-10-PCS | Mod: CPTII,S$GLB,, | Performed by: ANESTHESIOLOGY

## 2019-07-17 PROCEDURE — 99214 PR OFFICE/OUTPT VISIT, EST, LEVL IV, 30-39 MIN: ICD-10-PCS | Mod: S$GLB,,, | Performed by: ANESTHESIOLOGY

## 2019-07-17 PROCEDURE — 99214 OFFICE O/P EST MOD 30 MIN: CPT | Mod: S$GLB,,, | Performed by: ANESTHESIOLOGY

## 2019-07-17 PROCEDURE — 99999 PR PBB SHADOW E&M-EST. PATIENT-LVL IV: CPT | Mod: PBBFAC,,, | Performed by: ANESTHESIOLOGY

## 2019-07-17 PROCEDURE — 3008F BODY MASS INDEX DOCD: CPT | Mod: CPTII,S$GLB,, | Performed by: ANESTHESIOLOGY

## 2019-07-17 PROCEDURE — 99999 PR PBB SHADOW E&M-EST. PATIENT-LVL IV: ICD-10-PCS | Mod: PBBFAC,,, | Performed by: ANESTHESIOLOGY

## 2019-07-17 NOTE — PATIENT INSTRUCTIONS
Increase etodolac 400 mg to twice daily for pain    I have placed a referral to our physical therapists to see about coccyx (tailbone) manipulation for relief of your pain.

## 2019-07-17 NOTE — PROGRESS NOTES
Subjective:      Patient ID: Sarah Mukherjee is a 39 y.o. female.    Chief Complaint: Back Pain    Referred by: No ref. provider found     HPI:    Mrs. Mukherjee is a 34-year-old female who presents today with Low back pain.  Her medical history is significant for depression, anxiety, neurogenic bladder and endometriosis, status post total hysterectomy. She reports her back pain is present about 2 and half years.  She has been seeing Dr. Matt for her pain.  He has had multiple injections for her all with limited benefit.  These have included radiofrequency ablations and epidurals.  Her most recent was March 11 of this year.  This procedure actually worsened her pain. Of note, she reports multiple life stressors, including the fact that she has had a hysterectomy which means that she her  cannot have children.   Her pain is described in detail below.    Interval History (1/26/2017):  She returns today for follow up.  She reports that she was doing better until 2 months ago when she start having a new onset of left lower extremity radiating pain.  The pain originates in her low back and radiates down the posterolateral aspect of her left leg to the anterior shin.  This began after she was hunting and sitting for a prolonged period of time in a deer stand.  She had one what sounds like a Left L4 TFESI by Dr. Mike Stewart that caused her pain to be worse.  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    Interval Hisotyr (3/22/2017):  She returns to clinic today following recent L4-5 IL STEWART on 3/7/17 without continued relief of symptoms.  States that she received about 2 days worth of significant relief, but her symptoms returned soon thereafter.  Feels like they are possibly worse than prior to STEWART.  Describes symptoms in same way - constant left-sided lumbosacral pain that radiates down the posterior aspect into her knee.  Radiating pain less frequent than the constant back pain  but can be more severe at times.  Not currently doing any stretching or strengthening exercises.  Taking Gabapentin and Tizanidine at night.      Interval History (10/3/2018):  She returns today for follow up.  She reports that the injections helped somewhat for 4-6 weeks.  After that, she started physical therapy after that, which worsening her pain.  She then had a few falls in which she felt that her leg gave out.  Each fall caused worsening pain. Tizanidine 4 mg at night has been helpful for the pain, but she is not taking anything during the day.    Of note, she had an injection with Dr. Mccormack in the past year.  She thinks that this was a radiofrequency ablation.  This did not provide any relief.  She then had an SI joint injection.     Interval History (12/20/2018):  She returns today for follow up.  She reports that her pain is the same.  Of note, she is reporting worsening migraines.  She reports that these happened when her pain is bad.  She has noticed that they have been getting worse lately.  Of note, she also notes that her blood pressure has been of lately.  Today, it is 153/108.  She reports that she was recently given Fioricet and that she took 2 of them this morning.    Interval History (6/5/2019):  She returns today for follow up.  She reports that her low back/leg pain have resolved following the the RFA in January.  She is now reporting left sided pain near her tailbone.  This is worse with sitting and rising from a seated position.  Better with standing and walking.   has been helpful for the pain.    Interval History (7/17/2019):  She returns today for follow up approximately 3 weeks following coccyx injection. She claims a 0% pain reduction after the procedure. She states that her pain continues to be isolated to the area just left of the coccyx and has not changed in intensity or character. She experiences pain relief when using a coccyx pillow.     Physical Therapy: The extended course of  physical therapy in 2013  This was helpful while she was going.  She did her home exercise program until a recent bladder infection.  Since that time she's not done her HEP    Non-pharmacologic Treatment: Rest, heat, and coccyx pillow make her pain better.         · Ice/Heat: Mild benefit for tailbone pain  · TENS? Not tried    Pain Medications:         · Currently taking: Tizanidine 4 mg QHS, etodolac 400 mg twice daily, Topamax, Ambien, Xanax, Imitrex, Fioricet    · Has tried in the past:  Gabapentin 900 mg QHS, Hydrocodone/acetaminophen was not helpful.  She recently tried gabapentin 100 mg daily with no benefit.  Relafen BID.    · Has not tried: TCAs, SNRIs, cream    Blood thinners: None    Interventional Therapies:   · Multiple epidural steroid injections and radiofrequency ablation.  · Left L4 TFESI: worsening pain  · ? RFA with Dr. Martinez:   · ? SI joint injection: No benefit  · Left L2-5 RFA 2014  · Left SI joint injection 2014  · 3/7/17 L4-5 IL STEWART: 2 days of relief  · 3/28/2019: L4/5 ILESI: 4-6 weeks benefit  · 4/2017: Bilateral L4 TFESI  · 1/2019: Left L5-S3 MBB: Positive  · 1/2019: Left L5-S3 RFA: Excellent relief ongoing on that pain  · 6/2019: Coccyx injection: 0% pain reduction    Relevant Surgeries: She has had a hysterectomy    Affecting sleep? Yes    Affecting daily activities? Yes    Depressive symptoms? Yes          · SI/HI? No    Work status: She is disabled    Last 3 PDI Scores 7/17/2019 6/5/2019 12/20/2018   Pain Disability Index (PDI) 0 0 29       Review of Systems   Constitution: Negative for chills, fever, malaise/fatigue, weight gain and weight loss.   HENT: Negative for ear pain and hoarse voice.    Eyes: Negative for blurred vision, pain and visual disturbance.   Cardiovascular: Negative for chest pain, dyspnea on exertion and irregular heartbeat.   Respiratory: Negative for cough, shortness of breath and wheezing.    Endocrine: Negative for cold intolerance and heat intolerance.    Hematologic/Lymphatic: Negative for adenopathy and bleeding problem. Does not bruise/bleed easily.   Skin: Negative for color change, itching and rash.   Musculoskeletal: Positive for back pain.   Gastrointestinal: Negative for change in bowel habit, diarrhea, hematemesis, hematochezia, melena and vomiting.   Genitourinary: Negative for flank pain, frequency, hematuria and urgency.   Neurological: Negative for difficulty with concentration, dizziness, headaches, loss of balance and seizures.   Psychiatric/Behavioral: Positive for depression. Negative for altered mental status and suicidal ideas. The patient is nervous/anxious.    Allergic/Immunologic: Negative for HIV exposure.   All other systems reviewed and are negative.    Past Medical History:   Diagnosis Date    Cancer     skin cancer removed    Crohn's disease     Degenerative joint disease of spine     Esophagitis     History of stomach ulcers     Lumbar facet arthropathy 6/20/2014    Neurogenic bladder        Past Surgical History:   Procedure Laterality Date    Block, Nerve MEDIAL BRANCH BLOCKS LEFT LUMBAR L5-S3 Left 1/4/2019    [facility]    BLOCK-NERVE-Selective NerveRoot Bilateral 4/11/2017    [facility]    BREAST RECONSTRUCTION      HYSTERECTOMY      total hysterectomy in 20s due to fibroid, endometriosis    INJECTION, JOINT COCCYX Left 6/28/2019    [facility]    INJECTION-STEROID-EPIDURAL-LUMBAR N/A 3/28/2017    [facility]    INJECTION-STEROID-EPIDURAL-LUMBAR N/A 3/7/2017    [facility]    PELVIC LAPAROSCOPY      RADIOFREQUENCY ABLATION LEFT L5-S3 RFA WITH HALYARD COOLIEF Left 1/11/2019    [facility]    TONSILLECTOMY         Review of patient's allergies indicates:   Allergen Reactions    Penicillins  Hives       Current Outpatient Medications   Medication Sig Dispense Refill    ALPRAZolam (XANAX) 0.5 MG tablet TAKE 1 TABLET BY MOUTH TWICE A DAY AS NEEDED FOR ANXIETY 60 tablet 2    ALPRAZolam (XANAX) 0.5 MG tablet Take 0.5 mg by mouth.      desvenlafaxine succinate (PRISTIQ) 50 MG Tb24 TAKE 1 TABLET (50 MG TOTAL) BY MOUTH ONCE DAILY. 30 tablet 11    dicyclomine (BENTYL) 10 MG capsule TAKE 1 CAPSULE BY MOUTH TWICE A DAY AS NEEDED FOR PAIN  1    dicyclomine (BENTYL) 20 mg tablet Take 20 mg by mouth.      ferrous sulfate (IRON) 325 mg (65 mg iron) CpSR Take by mouth.      gabapentin (NEURONTIN) 300 MG capsule TAKE 3 CAPSULES (900 MG TOTAL) BY MOUTH EVERY EVENING.  2    pantoprazole (PROTONIX) 40 MG tablet pantoprazole 40 mg tablet,delayed release   TAKE 1 TABLET (40 MG TOTAL) BY MOUTH ONCE DAILY.      pantoprazole (PROTONIX) 40 MG tablet TAKE 1 TABLET BY MOUTH EVERY DAY 30 tablet 5    sumatriptan (IMITREX) 25 MG Tab Take 25 mg by mouth.      tiZANidine (ZANAFLEX) 2 MG tablet TAKE 1 TABLET BY MOUTH AT BEDTIME AS NEEDED FOR SPASM(S) 45 tablet 1    tiZANidine (ZANAFLEX) 2 MG tablet TAKE 1 TABLET BY MOUTH AT BEDTIME AS NEEDED FOR SPASM(S) 45 tablet 1    topiramate (TOPAMAX) 100 MG tablet TAKE 1 TABLET (100 MG TOTAL) BY MOUTH EVERY EVENING. 30 tablet 5    zolpidem (AMBIEN) 10 mg Tab Take 1 tablet (10 mg total) by mouth nightly as needed. 30 tablet 5    cholestyramine-aspartame (PREVALITE) 4 gram PwPk Prevalite 4 gram powder for susp in a packet      dicyclomine (BENTYL) 10 MG capsule       diphenoxylate-atropine 2.5-0.025 mg (LOMOTIL) 2.5-0.025 mg per tablet Take 1 tablet by mouth.      eluxadoline (VIBERZI) 100 mg Tab Take 100 mg by mouth.      etodolac (LODINE) 400 MG tablet TAKE 1 TABLET BY MOUTH TWICE A DAY 60 tablet 5    GAVILYTE-C 240-22.72-6.72 -5.84 gram SolR PLEASE USE AS INSTRUCTED  0    ondansetron (ZOFRAN) 4 MG tablet Take 4 mg by mouth.      pantoprazole (PROTONIX) 40 MG tablet Take 40  "mg by mouth.      topiramate (TOPAMAX) 25 MG tablet TAKE 1 TABLET AT BEDTIME FOR 7 DAYS THEN INCREASE TO 2 TABLETS AT BEDTIME      topiramate (TOPAMAX) 50 MG tablet Take 50 mg by mouth 2 (two) times daily.  5    zolpidem (AMBIEN) 5 MG Tab TAKE 1 TABLET BY MOUTH EVERY DAY AT BEDTIME AS NEEDED       No current facility-administered medications for this visit.        Family History   Problem Relation Age of Onset    Diabetes Mother     Hypertension Mother     Breast cancer Maternal Aunt 47    Ovarian cancer Maternal Aunt        Social History     Socioeconomic History    Marital status:      Spouse name: Not on file    Number of children: Not on file    Years of education: Not on file    Highest education level: Not on file   Occupational History    Not on file   Social Needs    Financial resource strain: Not on file    Food insecurity:     Worry: Not on file     Inability: Not on file    Transportation needs:     Medical: Not on file     Non-medical: Not on file   Tobacco Use    Smoking status: Never Smoker    Smokeless tobacco: Never Used   Substance and Sexual Activity    Alcohol use: Yes     Comment: occ    Drug use: No    Sexual activity: Yes   Lifestyle    Physical activity:     Days per week: Not on file     Minutes per session: Not on file    Stress: Not on file   Relationships    Social connections:     Talks on phone: Not on file     Gets together: Not on file     Attends Orthodox service: Not on file     Active member of club or organization: Not on file     Attends meetings of clubs or organizations: Not on file     Relationship status: Not on file   Other Topics Concern    Not on file   Social History Narrative    Not on file           Objective:      Vitals:    07/17/19 0809   BP: (!) 142/99   Pulse: 74   Resp: 18   Temp: 97.4 °F (36.3 °C)   Weight: 95.7 kg (211 lb)   Height: 5' 2" (1.575 m)   PainSc:   6     GEN:  Well developed, well nourished.  No acute distress. No " pain behavior.  HEENT:  No trauma.  Mucous membranes moist.  Nares patent bilaterally.  PSYCH: Normal affect. Thought content appropriate.  CHEST:  Breathing symmetric.  No audible wheezing.  ABD: Soft, non-tender, non-distended.  SKIN:  Warm, pink, dry.  No rash on exposed areas.    EXT:  No cyanosis, clubbing, or edema.  No color change or changes in nail or hair growth.  NEURO/MUSCULOSKELETAL:  Fully alert, oriented, and appropriate. Speech normal belia. No cranial nerve deficits.   Gait: Normal.    TTP over coccyx on the left side  Negative pain with axial/facet loading bilaterally  No TTP over left SI joint       Imaging:      Result Narrative       MRI lumbar spine without contrast.    Comparison: None.    Technique: Sagittal T1, T2, stir and axial T2 and T1-weighted images were obtained. The patient received 20 cc of Omniscan IV contrast .    Results: The alignment of the lumbar spine is normal. Vertebral body heights and disk spaces are relatively well maintained. No evidence of malignant bone marrow replacement process or infection. The conus medullaris terminates in good position. Very  mild disk desiccation seen at L4-5 with very minimal disk bulge and small left foraminal disk protrusion. No there is no central canal or foraminal stenosis is seen at any level. There is mild facet joint degenerative changes at L5 S1. The paraspinal  soft tissues appear normal. Following the administration of IV gadolinium contrast, no areas of abnormal enhancement seen to suggest an inflammatory, infectious or tumor process.          Result Impression       Subtle left foraminal disk protrusion at L4-L5 causing no significant mass effect on the exiting nerve root. There is no severe central canal or foraminal stenosis.         Assessment:       No diagnosis found.      Plan:       1. Coccydynia  Ambulatory consult to Physical Therapy     From her first visit: She has a history of chronic pain following GYN surgery with  irritative bladder.  In addition to this, she has a history of multiple life stressors.  Her exam is essentially benign with each of the above diagnoses the relatively minor.  Her MRI is essentially normal.  I suspect that there may be a large underlying central component to her pain.  Because of this, I would favor starting with conservative treatment regiment.  I recommend holding off on interventional procedures for now.  I do not feel that she is a candidate for spinal cord termination at this time.  Of note, on her physical exam today her core strength was quite weak.     Assessment from 3/22/2017: Today, her central sensitization is under much better control than when I saw her in the past.  Today, she has focal physical exam findings and symptoms of a left L4 radiculitis.     10/3/2018: Today, she presents with a different left sided low back and leg pain that is not the same as before.    7/17/2019: Patient currently without back pain. Her coccyx pain has failed to improve after her most recent coccyx injection. She has only been taking the etodolac QHS. Currently not endorsing poorly controlled headaches.     I discussed the treatment options with her today, including risks, benefits, and alternatives. All available images were reviewed. The patient is aware of the risks and benefits of the medications being prescribed, common side effects, and proper usage.    1. Referral to PT for coccyx manipulation  2. Asked patient to increase etodolac to BID.    3. Continue Tizanadine to 2 pills at night prn for pain.  4. Given her failed response to multiple traditional treatments for migraine headaches, I wonder if her headache is more of a stress response.  We had previously discussed investigating online resources related to psychological treatment of chronic pain, including modalities such as CBT, biofeedback techniques, meditation, and relaxation. Currently not endorsing poorly controlled headaches.   5. I have  stressed the importance of physical activity and a home exercise plan to help with pain and improve overall health.  6. RTC in one month to evaluate response to PT    I have seen the patient with the resident physician.  We have come up with the above plan.  The patient is in agreement with our plan. I agree with the above note which I have edited where appropriate.     Andra Burt MD  07/17/2019    The above plan and management options were discussed at length with patient. Patient is in agreement with the above and verbalized understanding. It will be communicated with the consulting physician via electronic record, fax, or mail

## 2019-07-24 DIAGNOSIS — F41.9 ANXIETY: ICD-10-CM

## 2019-07-24 RX ORDER — ALPRAZOLAM 0.5 MG/1
TABLET ORAL
Qty: 60 TABLET | Refills: 2 | OUTPATIENT
Start: 2019-07-24

## 2019-07-26 DIAGNOSIS — F41.9 ANXIETY: ICD-10-CM

## 2019-07-30 RX ORDER — ALPRAZOLAM 0.5 MG/1
0.5 TABLET ORAL 2 TIMES DAILY
Qty: 60 TABLET | Refills: 2 | OUTPATIENT
Start: 2019-07-30

## 2019-07-30 RX ORDER — ALPRAZOLAM 0.5 MG/1
TABLET ORAL
Qty: 60 TABLET | Refills: 2 | OUTPATIENT
Start: 2019-07-30

## 2019-07-31 DIAGNOSIS — G43.009 MIGRAINE WITHOUT AURA AND WITHOUT STATUS MIGRAINOSUS, NOT INTRACTABLE: ICD-10-CM

## 2019-08-01 RX ORDER — TOPIRAMATE 100 MG/1
TABLET, FILM COATED ORAL
Qty: 90 TABLET | Refills: 1 | Status: SHIPPED | OUTPATIENT
Start: 2019-08-01 | End: 2020-02-26 | Stop reason: SDUPTHER

## 2019-08-05 RX ORDER — GABAPENTIN 300 MG/1
CAPSULE ORAL
Qty: 90 CAPSULE | Refills: 8 | Status: SHIPPED | OUTPATIENT
Start: 2019-08-05 | End: 2020-02-26 | Stop reason: SDUPTHER

## 2019-08-28 ENCOUNTER — OFFICE VISIT (OUTPATIENT)
Dept: FAMILY MEDICINE | Facility: CLINIC | Age: 39
End: 2019-08-28
Payer: COMMERCIAL

## 2019-08-28 VITALS
WEIGHT: 209 LBS | BODY MASS INDEX: 38.46 KG/M2 | OXYGEN SATURATION: 96 % | HEIGHT: 62 IN | DIASTOLIC BLOOD PRESSURE: 86 MMHG | HEART RATE: 73 BPM | SYSTOLIC BLOOD PRESSURE: 110 MMHG

## 2019-08-28 DIAGNOSIS — F41.9 ANXIETY: ICD-10-CM

## 2019-08-28 DIAGNOSIS — K21.9 GASTROESOPHAGEAL REFLUX DISEASE, ESOPHAGITIS PRESENCE NOT SPECIFIED: Primary | ICD-10-CM

## 2019-08-28 DIAGNOSIS — F33.1 MODERATE EPISODE OF RECURRENT MAJOR DEPRESSIVE DISORDER: ICD-10-CM

## 2019-08-28 DIAGNOSIS — G47.00 INSOMNIA, UNSPECIFIED TYPE: ICD-10-CM

## 2019-08-28 PROCEDURE — 99999 PR PBB SHADOW E&M-EST. PATIENT-LVL III: ICD-10-PCS | Mod: PBBFAC,,, | Performed by: FAMILY MEDICINE

## 2019-08-28 PROCEDURE — 99214 PR OFFICE/OUTPT VISIT, EST, LEVL IV, 30-39 MIN: ICD-10-PCS | Mod: S$GLB,,, | Performed by: FAMILY MEDICINE

## 2019-08-28 PROCEDURE — 99214 OFFICE O/P EST MOD 30 MIN: CPT | Mod: S$GLB,,, | Performed by: FAMILY MEDICINE

## 2019-08-28 PROCEDURE — 3008F PR BODY MASS INDEX (BMI) DOCUMENTED: ICD-10-PCS | Mod: CPTII,S$GLB,, | Performed by: FAMILY MEDICINE

## 2019-08-28 PROCEDURE — 3008F BODY MASS INDEX DOCD: CPT | Mod: CPTII,S$GLB,, | Performed by: FAMILY MEDICINE

## 2019-08-28 PROCEDURE — 99999 PR PBB SHADOW E&M-EST. PATIENT-LVL III: CPT | Mod: PBBFAC,,, | Performed by: FAMILY MEDICINE

## 2019-08-28 RX ORDER — ALPRAZOLAM 0.5 MG/1
0.5 TABLET ORAL 2 TIMES DAILY
Qty: 60 TABLET | Refills: 2 | Status: SHIPPED | OUTPATIENT
Start: 2019-08-28 | End: 2019-12-01 | Stop reason: SDUPTHER

## 2019-08-28 RX ORDER — DESVENLAFAXINE SUCCINATE 50 MG/1
TABLET, EXTENDED RELEASE ORAL
Qty: 30 TABLET | Refills: 11 | Status: SHIPPED | OUTPATIENT
Start: 2019-08-28 | End: 2019-10-09 | Stop reason: SDUPTHER

## 2019-08-28 RX ORDER — ZOLPIDEM TARTRATE 10 MG/1
10 TABLET ORAL NIGHTLY PRN
Qty: 30 TABLET | Refills: 5 | Status: SHIPPED | OUTPATIENT
Start: 2019-08-28 | End: 2020-02-26 | Stop reason: SDUPTHER

## 2019-08-28 RX ORDER — PANTOPRAZOLE SODIUM 40 MG/1
40 TABLET, DELAYED RELEASE ORAL DAILY
Qty: 30 TABLET | Refills: 5 | Status: SHIPPED | OUTPATIENT
Start: 2019-08-28 | End: 2020-02-26 | Stop reason: SDUPTHER

## 2019-08-28 NOTE — PROGRESS NOTES
Subjective:       Patient ID: Sarah Mukherjee is a 39 y.o. female.    Chief Complaint: Follow Up/ Renew Medications    39 year old female presents for refills of her medication. She states her dog almost . She states she feels the pristiq works well.s he is still using ambien for sleep and this is effective.       Past Medical History:  No date: Cancer      Comment:  skin cancer removed  No date: Crohn's disease  No date: Degenerative joint disease of spine  No date: Esophagitis  No date: History of stomach ulcers  2014: Lumbar facet arthropathy  No date: Neurogenic bladder   Past Surgical History:  2019: Block, Nerve MEDIAL BRANCH BLOCKS LEFT LUMBAR L5-S3; Left      Comment:  Performed by [facility] at [facility]  2017: BLOCK-NERVE-Selective NerveRoot; Bilateral      Comment:  Performed by Andra Burt MD at [facility]  No date: BREAST RECONSTRUCTION  No date: HYSTERECTOMY      Comment:  total hysterectomy in 20s due to fibroid, endometriosis  2019: INJECTION, JOINT COCCYX; Left      Comment:  Performed by Andra Burt MD at [facility]  3/28/2017: INJECTION-STEROID-EPIDURAL-LUMBAR; N/A      Comment:  Performed by Andra Burt MD at [facility]  3/7/2017: INJECTION-STEROID-EPIDURAL-LUMBAR; N/A      Comment:  Performed by Andra Burt MD at [facility]  No date: PELVIC LAPAROSCOPY  2019: RADIOFREQUENCY ABLATION LEFT L5-S3 RFA WITH HALYARD   COOLIEF; Left      Comment:  Performed by Andra Burt MD at [facility]  No date: TONSILLECTOMY  Review of patient's family history indicates:  Problem: Diabetes      Relation: Mother          Age of Onset: (Not Specified)  Problem: Hypertension      Relation: Mother          Age of Onset: (Not Specified)  Problem: Breast cancer      Relation: Maternal Aunt          Age of Onset: 47  Problem: Ovarian cancer      Relation: Maternal Aunt          Age of Onset: (Not Specified)    Social History     Socioeconomic History      Marital status:       Spouse name: Not on file      Number of children: Not on file      Years of education: Not on file      Highest education level: Not on file    Occupational History      Not on file    Social Needs      Financial resource strain: Not hard at all      Food insecurity:        Worry: Never true        Inability: Never true      Transportation needs:        Medical: No        Non-medical: No    Tobacco Use      Smoking status: Never Smoker      Smokeless tobacco: Never Used    Substance and Sexual Activity      Alcohol use: Yes        Frequency: Never        Drinks per session: Patient refused        Binge frequency: Never        Comment: occ      Drug use: No      Sexual activity: Yes    Lifestyle      Physical activity:        Days per week: 4 days        Minutes per session: 40 min      Stress: Very much    Relationships      Social connections:        Talks on phone: More than three times a week        Gets together: More than three times a week        Attends Mandaeism service: Not on file        Active member of club or organization: No        Attends meetings of clubs or organizations: Never        Relationship status:     Other Topics      Concerns:        Not on file    Social History Narrative      Not on file        Review of Systems   Constitutional: Negative for activity change and unexpected weight change.   HENT: Negative for hearing loss, rhinorrhea and trouble swallowing.    Eyes: Negative for discharge and visual disturbance.   Respiratory: Negative for chest tightness and wheezing.    Cardiovascular: Negative for chest pain and palpitations.   Gastrointestinal: Negative for blood in stool, constipation, diarrhea and vomiting.   Endocrine: Negative for polydipsia and polyuria.   Genitourinary: Negative for difficulty urinating, dysuria, hematuria and menstrual problem.   Musculoskeletal: Positive for arthralgias. Negative for joint swelling  "and neck pain.   Neurological: Positive for headaches. Negative for weakness.   Psychiatric/Behavioral: Negative for confusion and dysphoric mood.       Objective:       Vitals:    08/28/19 1337   BP: 110/86   Pulse: 73   SpO2: 96%   Weight: 94.8 kg (208 lb 15.9 oz)   Height: 5' 2" (1.575 m)         Physical Exam   Constitutional: She is oriented to person, place, and time. She appears well-developed and well-nourished. No distress.   HENT:   Head: Normocephalic and atraumatic.   Eyes: Conjunctivae are normal.   Neck: Normal range of motion. Neck supple. Carotid bruit is not present.   Cardiovascular: Normal rate, regular rhythm and normal heart sounds. Exam reveals no gallop and no friction rub.   No murmur heard.  Pulmonary/Chest: Effort normal and breath sounds normal. No respiratory distress. She has no wheezes. She has no rales.   Musculoskeletal: She exhibits no edema.   Neurological: She is alert and oriented to person, place, and time.   Skin: She is not diaphoretic.       Assessment:       1. Gastroesophageal reflux disease, esophagitis presence not specified    2. Anxiety    3. Moderate episode of recurrent major depressive disorder    4. Insomnia, unspecified type        Plan:       Sarah was seen today for follow up/ renew medications.    Diagnoses and all orders for this visit:    Gastroesophageal reflux disease, esophagitis presence not specified  -     pantoprazole (PROTONIX) 40 MG tablet; Take 1 tablet (40 mg total) by mouth once daily.    Anxiety  -     ALPRAZolam (XANAX) 0.5 MG tablet; Take 1 tablet (0.5 mg total) by mouth 2 (two) times daily.    Moderate episode of recurrent major depressive disorder  -     desvenlafaxine succinate (PRISTIQ) 50 MG Tb24; TAKE 1 TABLET (50 MG TOTAL) BY MOUTH ONCE DAILY.    Insomnia, unspecified type  -     zolpidem (AMBIEN) 10 mg Tab; Take 1 tablet (10 mg total) by mouth nightly as needed.      Stable. Refilled meds.        "

## 2019-09-05 ENCOUNTER — PATIENT MESSAGE (OUTPATIENT)
Dept: FAMILY MEDICINE | Facility: CLINIC | Age: 39
End: 2019-09-05

## 2019-09-17 DIAGNOSIS — M62.838 MUSCLE SPASM: ICD-10-CM

## 2019-09-18 RX ORDER — TIZANIDINE 2 MG/1
TABLET ORAL
Qty: 30 TABLET | Refills: 2 | Status: SHIPPED | OUTPATIENT
Start: 2019-09-18 | End: 2019-11-14 | Stop reason: SDUPTHER

## 2019-10-07 DIAGNOSIS — F33.1 MODERATE EPISODE OF RECURRENT MAJOR DEPRESSIVE DISORDER: ICD-10-CM

## 2019-10-07 RX ORDER — DESVENLAFAXINE SUCCINATE 50 MG/1
TABLET, EXTENDED RELEASE ORAL
Qty: 30 TABLET | Refills: 11 | Status: CANCELLED | OUTPATIENT
Start: 2019-10-07

## 2019-10-07 NOTE — TELEPHONE ENCOUNTER
Last Office Visit Info:   The patient's last visit with Staci Sorenson MD was on 8/28/2019.    The patient's last visit in current department was on 8/28/2019.  last refill         Last CBC Results:   Lab Results   Component Value Date    WBC 6.77 03/14/2019    HGB 12.9 03/14/2019    HCT 40.8 03/14/2019     03/14/2019       Last CMP Results  Lab Results   Component Value Date     03/14/2019    K 3.9 03/14/2019     03/14/2019    CO2 22 (L) 03/14/2019    BUN 20 03/14/2019    CREATININE 1.0 03/14/2019    CALCIUM 9.2 03/14/2019    ALBUMIN 3.6 03/14/2019    AST 16 03/14/2019    ALT 12 03/14/2019       Last Lipids  Lab Results   Component Value Date    CHOL 227 (H) 03/14/2019    TRIG 130 03/14/2019    HDL 44 03/14/2019    LDLCALC 157.0 03/14/2019       Last A1C  No results found for: HGBA1C    Last TSH  Lab Results   Component Value Date    TSH 1.419 03/14/2019         Current Med Refills  Medication List with Changes/Refills   Current Medications    ALPRAZOLAM (XANAX) 0.5 MG TABLET    Take 1 tablet (0.5 mg total) by mouth 2 (two) times daily.       Start Date: 8/28/2019 End Date: --    DESVENLAFAXINE SUCCINATE (PRISTIQ) 50 MG TB24    TAKE 1 TABLET (50 MG TOTAL) BY MOUTH ONCE DAILY.       Start Date: 8/28/2019 End Date: --    ETODOLAC (LODINE) 400 MG TABLET    TAKE 1 TABLET BY MOUTH TWICE A DAY       Start Date: 6/17/2019 End Date: 12/14/2019    FERROUS SULFATE (IRON) 325 MG (65 MG IRON) CPSR    Take by mouth.       Start Date: --        End Date: --    GABAPENTIN (NEURONTIN) 300 MG CAPSULE    TAKE 3 CAPSULES (900 MG TOTAL) BY MOUTH EVERY EVENING.       Start Date: 8/5/2019  End Date: --    PANTOPRAZOLE (PROTONIX) 40 MG TABLET    Take 1 tablet (40 mg total) by mouth once daily.       Start Date: 8/28/2019 End Date: --    TIZANIDINE (ZANAFLEX) 2 MG TABLET    TAKE 1 TABLET BY MOUTH AT BEDTIME AS NEEDED FOR SPASM(S)       Start Date: 9/17/2018 End Date: --    TIZANIDINE (ZANAFLEX) 2 MG TABLET    TAKE 1  TABLET BY MOUTH AT BEDTIME AS NEEDED FOR SPASM(S)       Start Date: 9/18/2019 End Date: --    TOPIRAMATE (TOPAMAX) 100 MG TABLET    TAKE 1 TABLET (100 MG TOTAL) BY MOUTH EVERY EVENING.       Start Date: 8/1/2019  End Date: --    ZOLPIDEM (AMBIEN) 10 MG TAB    Take 1 tablet (10 mg total) by mouth nightly as needed.       Start Date: 8/28/2019 End Date: 2/26/2020

## 2019-10-07 NOTE — TELEPHONE ENCOUNTER
----- Message from Gerard Tierney sent at 10/7/2019  4:18 PM CDT -----  Contact: Self  Type: RX Refill Request    Who Called: Self    Have you contacted your pharmacy: no  Refill or New Rx: refill  RX Name and Strength: desvenlafaxine succinate (PRISTIQ) 50 MG Tb24    Preferred Pharmacy with phone number: [id number] - [place], [place] - [address] [facility] [phone] (Phone)  [phone] (Fax)      Local or Mail Order: local    Ordering Provider: Dr. Sorenson    Would the patient rather a call back or a [facility] call  Best Call Back Number: [phone]  Additional Information: authorization needed for this medication

## 2019-10-09 ENCOUNTER — PATIENT MESSAGE (OUTPATIENT)
Dept: FAMILY MEDICINE | Facility: CLINIC | Age: 39
End: 2019-10-09

## 2019-10-09 DIAGNOSIS — F33.1 MODERATE EPISODE OF RECURRENT MAJOR DEPRESSIVE DISORDER: ICD-10-CM

## 2019-10-09 RX ORDER — DESVENLAFAXINE SUCCINATE 50 MG/1
TABLET, EXTENDED RELEASE ORAL
Qty: 30 TABLET | Refills: 11 | Status: SHIPPED | OUTPATIENT
Start: 2019-10-09 | End: 2019-11-14 | Stop reason: SDUPTHER

## 2019-10-29 ENCOUNTER — PES CALL (OUTPATIENT)
Dept: ADMINISTRATIVE | Facility: CLINIC | Age: 39
End: 2019-10-29

## 2019-11-14 ENCOUNTER — TELEPHONE (OUTPATIENT)
Dept: FAMILY MEDICINE | Facility: CLINIC | Age: 39
End: 2019-11-14

## 2019-11-14 ENCOUNTER — OFFICE VISIT (OUTPATIENT)
Dept: FAMILY MEDICINE | Facility: CLINIC | Age: 39
End: 2019-11-14
Payer: COMMERCIAL

## 2019-11-14 VITALS
HEART RATE: 71 BPM | OXYGEN SATURATION: 99 % | WEIGHT: 207.25 LBS | HEIGHT: 62 IN | SYSTOLIC BLOOD PRESSURE: 120 MMHG | DIASTOLIC BLOOD PRESSURE: 88 MMHG | TEMPERATURE: 98 F | BODY MASS INDEX: 38.14 KG/M2

## 2019-11-14 DIAGNOSIS — G43.009 MIGRAINE WITHOUT AURA AND WITHOUT STATUS MIGRAINOSUS, NOT INTRACTABLE: Primary | ICD-10-CM

## 2019-11-14 PROCEDURE — 99214 OFFICE O/P EST MOD 30 MIN: CPT | Mod: 25,S$GLB,, | Performed by: FAMILY MEDICINE

## 2019-11-14 PROCEDURE — 3008F PR BODY MASS INDEX (BMI) DOCUMENTED: ICD-10-PCS | Mod: CPTII,S$GLB,, | Performed by: FAMILY MEDICINE

## 2019-11-14 PROCEDURE — 99999 PR PBB SHADOW E&M-EST. PATIENT-LVL III: ICD-10-PCS | Mod: PBBFAC,,, | Performed by: FAMILY MEDICINE

## 2019-11-14 PROCEDURE — 99214 PR OFFICE/OUTPT VISIT, EST, LEVL IV, 30-39 MIN: ICD-10-PCS | Mod: 25,S$GLB,, | Performed by: FAMILY MEDICINE

## 2019-11-14 PROCEDURE — 96372 PR INJECTION,THERAP/PROPH/DIAG2ST, IM OR SUBCUT: ICD-10-PCS | Mod: S$GLB,,, | Performed by: FAMILY MEDICINE

## 2019-11-14 PROCEDURE — 3008F BODY MASS INDEX DOCD: CPT | Mod: CPTII,S$GLB,, | Performed by: FAMILY MEDICINE

## 2019-11-14 PROCEDURE — 96372 THER/PROPH/DIAG INJ SC/IM: CPT | Mod: S$GLB,,, | Performed by: FAMILY MEDICINE

## 2019-11-14 PROCEDURE — 99999 PR PBB SHADOW E&M-EST. PATIENT-LVL III: CPT | Mod: PBBFAC,,, | Performed by: FAMILY MEDICINE

## 2019-11-14 RX ORDER — KETOROLAC TROMETHAMINE 30 MG/ML
60 INJECTION, SOLUTION INTRAMUSCULAR; INTRAVENOUS
Status: COMPLETED | OUTPATIENT
Start: 2019-11-14 | End: 2019-11-14

## 2019-11-14 RX ORDER — BUTORPHANOL TARTRATE 10 MG/ML
1 SPRAY NASAL EVERY 4 HOURS PRN
Qty: 2.5 ML | Refills: 0 | Status: SHIPPED | OUTPATIENT
Start: 2019-11-14 | End: 2019-11-14 | Stop reason: SDUPTHER

## 2019-11-14 RX ORDER — BUTORPHANOL TARTRATE 10 MG/ML
1 SPRAY NASAL EVERY 4 HOURS PRN
Qty: 2.5 ML | Refills: 0 | Status: SHIPPED | OUTPATIENT
Start: 2019-11-14 | End: 2019-11-24

## 2019-11-14 RX ADMIN — KETOROLAC TROMETHAMINE 60 MG: 30 INJECTION, SOLUTION INTRAMUSCULAR; INTRAVENOUS at 11:11

## 2019-11-14 NOTE — TELEPHONE ENCOUNTER
----- Message from Brandyn Diaz sent at 11/14/2019  1:17 PM CST -----  Contact: BCSUMMER- Ezekiel Bran  Type:  Pharmacy Calling to Clarify an RX    Name of Caller: Ezekiel    Pharmacy Name: [facility]    Prescription Name: butorphanol (STADOL) 10 mg/mL nasal spray    What do they need to clarify? She needs a PA    Can you be contacted via [facility]?No    Best Call Back Number: [phone] and/or [phone] option 2    Additional Information: N/A

## 2019-11-14 NOTE — PROGRESS NOTES
After obtaining consent, and per orders of Dr. Sorenson, injection of Toradol 60mg/2mL given into the left upper outer quad gluteus by Harika Carver. Patient instructed to remain in clinic for 20 minutes afterwards, and to report any adverse reaction to me immediately.

## 2019-11-14 NOTE — PROGRESS NOTES
Subjective:       Patient ID: Sarah Mukherjee is a 39 y.o. female.    Chief Complaint: Migraine    Migraine    This is a new problem. The current episode started in the past 7 days (2 dyas ago). The problem has been unchanged. The pain is located in the frontal and retro-orbital region. The pain does not radiate. The pain quality is similar to prior headaches (but worsening). The pain is at a severity of 9/10. The pain is severe. Pertinent negatives include no nausea, phonophobia, photophobia or vomiting. Nothing aggravates the symptoms. Treatments tried: fioricet didn't help.     Past Medical History:   Diagnosis Date    Cancer     skin cancer removed    Crohn's disease     Degenerative joint disease of spine     Esophagitis     History of stomach ulcers     Lumbar facet arthropathy 6/20/2014    Neurogenic bladder       Past Surgical History:   Procedure Laterality Date    BREAST RECONSTRUCTION      HYSTERECTOMY      total hysterectomy in 20s due to fibroid, endometriosis    INJECTION OF ANESTHETIC AGENT AROUND NERVE Left 1/4/2019    Procedure: Block, Nerve MEDIAL BRANCH BLOCKS LEFT LUMBAR L5-S3;  Surgeon: Andra Burt MD;  Location: [facility];  Service: Pain Management;  Laterality: Left;  1 OF 2    INJECTION OF JOINT Left 6/28/2019    Procedure: INJECTION, JOINT COCCYX;  Surgeon: Andra Burt MD;  Location: [facility] PA[facility];  Service: Pain Management;  Laterality: Left;  INJECT JOINt & LEFT SIDE    PELVIC LAPAROSCOPY      TONSILLECTOMY       Family History   Problem Relation Age of Onset    Diabetes Mother     Hypertension Mother     Breast cancer Maternal Aunt 47    Ovarian cancer Maternal Aunt      Social History     Socioeconomic History    Marital status:      Spouse name: Not on file    Number of children: Not on file    Years of education: Not on file    Highest education level: Not on file   Occupational History    Not on file   Social Needs    Financial resource  "strain: Not hard at all    Food insecurity:     Worry: Never true     Inability: Never true    Transportation needs:     Medical: No     Non-medical: No   Tobacco Use    Smoking status: Never Smoker    Smokeless tobacco: Never Used   Substance and Sexual Activity    Alcohol use: Yes     Frequency: Never     Drinks per session: Patient refused     Binge frequency: Never     Comment: occ    Drug use: No    Sexual activity: Yes   Lifestyle    Physical activity:     Days per week: 4 days     Minutes per session: 40 min    Stress: Very much   Relationships    Social connections:     Talks on phone: More than three times a week     Gets together: More than three times a week     Attends Anabaptist service: Not on file     Active member of club or organization: No     Attends meetings of clubs or organizations: Never     Relationship status:    Other Topics Concern    Not on file   Social History Narrative    Not on file       Review of Systems   Eyes: Negative for photophobia.   Gastrointestinal: Negative for nausea and vomiting.       Objective:       Vitals:    11/14/19 1047   BP: 120/88   Pulse: 71   Temp: 98.2 °F (36.8 °C)   TempSrc: Oral   SpO2: 99%   Weight: 94 kg (207 lb 3.7 oz)   Height: 5' 2" (1.575 m)       Physical Exam   Constitutional: She is oriented to person, place, and time. She appears well-developed and well-nourished. No distress.   HENT:   Head: Normocephalic and atraumatic.   Cardiovascular: Normal rate, regular rhythm and normal heart sounds. Exam reveals no gallop and no friction rub.   No murmur heard.  Neurological: She is alert and oriented to person, place, and time.   Skin: She is not diaphoretic.       Assessment:       1. Migraine without aura and without status migrainosus, not intractable        Plan:       Sarah was seen today for migraine.    Diagnoses and all orders for this visit:    Migraine without aura and without status migrainosus, not intractable  -     " ketorolac injection 60 mg  -     butorphanol (STADOL) 10 mg/mL nasal spray; 1 spray by Nasal route every 4 (four) hours as needed for Pain.

## 2019-11-18 ENCOUNTER — PATIENT MESSAGE (OUTPATIENT)
Dept: FAMILY MEDICINE | Facility: CLINIC | Age: 39
End: 2019-11-18

## 2019-11-18 ENCOUNTER — TELEPHONE (OUTPATIENT)
Dept: FAMILY MEDICINE | Facility: CLINIC | Age: 39
End: 2019-11-18

## 2019-11-18 NOTE — TELEPHONE ENCOUNTER
Patient was prescribed Butorphanol Tartrate 10MG/ML solution  , Pharmacy is stating that med need's PA, sent to [facility] for PA .

## 2019-11-20 ENCOUNTER — TELEPHONE (OUTPATIENT)
Dept: FAMILY MEDICINE | Facility: CLINIC | Age: 39
End: 2019-11-20

## 2019-11-20 NOTE — TELEPHONE ENCOUNTER
----- Message from Miryam Salmeron sent at 11/20/2019  3:06 PM CST -----  Contact: angélica with clinical pharmacy services [phone]  Type: RX Refill Request    Who Called: angélica with clinical pharmacy services [phone]    Have you contacted your pharmacy:    Refill or New Rx:butorphanol (STADOL) 10 mg/mL nasal spray - needs PA. Needs additional documentation. Has questions. Call nagélica    RX Name and Strength:    How is the patient currently taking it? (ex. 1XDay):    Is this a 30 day or 90 day RX:    Preferred Pharmacy with phone number:    Local or Mail Order:    Ordering Provider:    Would the patient rather a call back or a response via [facility]?     Best Call Back Number:    Additional Information:

## 2019-11-20 NOTE — TELEPHONE ENCOUNTER
Answered all clinical questions regarding PA for stadol nasal spray.  Coordinator states she will get responses sent over to to pharmacist to add to case.

## 2019-12-01 DIAGNOSIS — F41.9 ANXIETY: ICD-10-CM

## 2019-12-07 DIAGNOSIS — M62.838 MUSCLE SPASM: ICD-10-CM

## 2019-12-12 RX ORDER — TIZANIDINE 2 MG/1
TABLET ORAL
Qty: 30 TABLET | Refills: 2 | Status: SHIPPED | OUTPATIENT
Start: 2019-12-12 | End: 2020-02-18 | Stop reason: SDUPTHER

## 2019-12-26 RX ORDER — ALPRAZOLAM 0.5 MG/1
TABLET ORAL
Qty: 60 TABLET | Refills: 2 | Status: SHIPPED | OUTPATIENT
Start: 2019-12-26 | End: 2020-02-18 | Stop reason: SDUPTHER

## 2020-01-10 DIAGNOSIS — G43.009 MIGRAINE WITHOUT AURA AND WITHOUT STATUS MIGRAINOSUS, NOT INTRACTABLE: ICD-10-CM

## 2020-01-23 RX ORDER — BUTORPHANOL TARTRATE 10 MG/ML
SPRAY NASAL
Qty: 1 ML | Refills: 0 | OUTPATIENT
Start: 2020-01-23

## 2020-02-18 ENCOUNTER — OFFICE VISIT (OUTPATIENT)
Dept: FAMILY MEDICINE | Facility: CLINIC | Age: 40
End: 2020-02-18
Payer: COMMERCIAL

## 2020-02-18 VITALS
TEMPERATURE: 99 F | DIASTOLIC BLOOD PRESSURE: 78 MMHG | OXYGEN SATURATION: 97 % | BODY MASS INDEX: 38.59 KG/M2 | WEIGHT: 211 LBS | SYSTOLIC BLOOD PRESSURE: 130 MMHG | HEART RATE: 97 BPM

## 2020-02-18 DIAGNOSIS — G47.00 INSOMNIA, UNSPECIFIED TYPE: ICD-10-CM

## 2020-02-18 DIAGNOSIS — G43.809 OTHER MIGRAINE WITHOUT STATUS MIGRAINOSUS, NOT INTRACTABLE: Primary | ICD-10-CM

## 2020-02-18 DIAGNOSIS — K21.9 GASTROESOPHAGEAL REFLUX DISEASE, ESOPHAGITIS PRESENCE NOT SPECIFIED: ICD-10-CM

## 2020-02-18 DIAGNOSIS — G43.009 MIGRAINE WITHOUT AURA AND WITHOUT STATUS MIGRAINOSUS, NOT INTRACTABLE: ICD-10-CM

## 2020-02-18 DIAGNOSIS — F33.1 MDD (MAJOR DEPRESSIVE DISORDER), RECURRENT EPISODE, MODERATE: ICD-10-CM

## 2020-02-18 DIAGNOSIS — F41.9 ANXIETY: ICD-10-CM

## 2020-02-18 PROCEDURE — 99214 PR OFFICE/OUTPT VISIT, EST, LEVL IV, 30-39 MIN: ICD-10-PCS | Mod: 25,S$GLB,, | Performed by: FAMILY MEDICINE

## 2020-02-18 PROCEDURE — 99214 OFFICE O/P EST MOD 30 MIN: CPT | Mod: 25,S$GLB,, | Performed by: FAMILY MEDICINE

## 2020-02-18 PROCEDURE — 99999 PR PBB SHADOW E&M-EST. PATIENT-LVL II: CPT | Mod: PBBFAC,,, | Performed by: FAMILY MEDICINE

## 2020-02-18 PROCEDURE — 3008F PR BODY MASS INDEX (BMI) DOCUMENTED: ICD-10-PCS | Mod: CPTII,S$GLB,, | Performed by: FAMILY MEDICINE

## 2020-02-18 PROCEDURE — 96372 THER/PROPH/DIAG INJ SC/IM: CPT | Mod: S$GLB,,, | Performed by: FAMILY MEDICINE

## 2020-02-18 PROCEDURE — 96372 PR INJECTION,THERAP/PROPH/DIAG2ST, IM OR SUBCUT: ICD-10-PCS | Mod: S$GLB,,, | Performed by: FAMILY MEDICINE

## 2020-02-18 PROCEDURE — 3008F BODY MASS INDEX DOCD: CPT | Mod: CPTII,S$GLB,, | Performed by: FAMILY MEDICINE

## 2020-02-18 PROCEDURE — 99999 PR PBB SHADOW E&M-EST. PATIENT-LVL II: ICD-10-PCS | Mod: PBBFAC,,, | Performed by: FAMILY MEDICINE

## 2020-02-18 RX ORDER — BUTORPHANOL TARTRATE 10 MG/ML
1 SPRAY NASAL EVERY 4 HOURS PRN
Qty: 2.5 ML | Refills: 2 | Status: SHIPPED | OUTPATIENT
Start: 2020-02-18 | End: 2020-02-19 | Stop reason: SDUPTHER

## 2020-02-18 RX ORDER — KETOROLAC TROMETHAMINE 30 MG/ML
60 INJECTION, SOLUTION INTRAMUSCULAR; INTRAVENOUS
Status: COMPLETED | OUTPATIENT
Start: 2020-02-18 | End: 2020-02-18

## 2020-02-18 RX ORDER — DESVENLAFAXINE 100 MG/1
100 TABLET, EXTENDED RELEASE ORAL DAILY
Qty: 30 TABLET | Refills: 11 | Status: SHIPPED | OUTPATIENT
Start: 2020-02-18 | End: 2021-02-07 | Stop reason: SDUPTHER

## 2020-02-18 RX ORDER — ALPRAZOLAM 0.5 MG/1
0.5 TABLET ORAL 2 TIMES DAILY
Qty: 60 TABLET | Refills: 2 | Status: SHIPPED | OUTPATIENT
Start: 2020-02-26 | End: 2020-02-19 | Stop reason: SDUPTHER

## 2020-02-18 RX ADMIN — KETOROLAC TROMETHAMINE 60 MG: 30 INJECTION, SOLUTION INTRAMUSCULAR; INTRAVENOUS at 11:02

## 2020-02-18 NOTE — PROGRESS NOTES
Administered Toradol 60 mg/2mL IM to left upper outer glut. No s/s of any adverse reaction noted.

## 2020-02-18 NOTE — PROGRESS NOTES
Subjective:       Patient ID: Sarah Mukherjee is a 40 y.o. female.    Chief Complaint: Migraine and Discuss/ Renew Medications    Migraine    This is a recurrent problem. The current episode started in the past 7 days (3 days). The problem occurs intermittently. The pain is located in the frontal (right frontal behind her eye) region. The pain quality is similar to prior headaches. Quality: pressure sensation. The pain is at a severity of 7/10. The pain is moderate. Associated symptoms include nausea and neck pain. Pertinent negatives include no coughing, fever, phonophobia, photophobia, rhinorrhea, sore throat or vomiting. The symptoms are aggravated by emotional stress. She has tried NSAIDs (topamax, 2 aleve, ) for the symptoms.   fioricet in the past made her heart race so she can't take this.   Review of Systems   Constitutional: Negative for fever.   HENT: Negative for rhinorrhea, sinus pain, sneezing and sore throat.    Eyes: Negative for photophobia.   Respiratory: Negative for cough.    Gastrointestinal: Positive for nausea. Negative for vomiting.   Musculoskeletal: Positive for neck pain.       Objective:       Vitals:    02/18/20 1058   BP: 130/78   Pulse: 97   Temp: 98.5 °F (36.9 °C)   TempSrc: Oral   SpO2: 97%   Weight: 95.7 kg (210 lb 15.7 oz)           Physical Exam   Constitutional: She is oriented to person, place, and time. She appears well-developed and well-nourished. No distress.   HENT:   Head: Normocephalic and atraumatic.   Neck: Normal range of motion. Neck supple.   Cardiovascular: Normal rate, regular rhythm and normal heart sounds. Exam reveals no gallop and no friction rub.   No murmur heard.  Pulmonary/Chest: Effort normal and breath sounds normal. No respiratory distress. She has no wheezes. She has no rales.   Neurological: She is alert and oriented to person, place, and time.   Skin: She is not diaphoretic.   Psychiatric: She has a normal mood and affect.       Assessment:       1.  Other migraine without status migrainosus, not intractable    2. MDD (major depressive disorder), recurrent episode, moderate    3. Anxiety        Plan:         Sarah was seen today for migraine and discuss/ renew medications.    Diagnoses and all orders for this visit:    Other migraine without status migrainosus, not intractable  -     butorphanol (STADOL) 10 mg/mL nasal spray; 1 spray by Nasal route every 4 (four) hours as needed for Pain.  -     ketorolac injection 60 mg    MDD (major depressive disorder), recurrent episode, moderate  -     desvenlafaxine succinate (PRISTIQ) 100 MG Tb24; Take 1 tablet (100 mg total) by mouth once daily.  Increasing pristiq to 100 mg as stress is likely causing insomnia and therefore more headaches.        Anxiety  -     ALPRAZolam (XANAX) 0.5 MG tablet; Take 1 tablet (0.5 mg total) by mouth 2 (two) times daily.  Stable. Refilled meds.

## 2020-02-19 ENCOUNTER — PATIENT MESSAGE (OUTPATIENT)
Dept: FAMILY MEDICINE | Facility: CLINIC | Age: 40
End: 2020-02-19

## 2020-02-19 DIAGNOSIS — G43.009 MIGRAINE WITHOUT AURA AND WITHOUT STATUS MIGRAINOSUS, NOT INTRACTABLE: ICD-10-CM

## 2020-02-19 DIAGNOSIS — K21.9 GASTROESOPHAGEAL REFLUX DISEASE, ESOPHAGITIS PRESENCE NOT SPECIFIED: ICD-10-CM

## 2020-02-19 DIAGNOSIS — G47.00 INSOMNIA, UNSPECIFIED TYPE: ICD-10-CM

## 2020-02-19 RX ORDER — ALPRAZOLAM 0.5 MG/1
0.5 TABLET ORAL 2 TIMES DAILY
Qty: 60 TABLET | Refills: 2 | Status: SHIPPED | OUTPATIENT
Start: 2020-02-26 | End: 2020-07-27 | Stop reason: SDUPTHER

## 2020-02-19 RX ORDER — BUTORPHANOL TARTRATE 10 MG/ML
1 SPRAY NASAL EVERY 4 HOURS PRN
Qty: 2.5 ML | Refills: 2 | Status: SHIPPED | OUTPATIENT
Start: 2020-02-19 | End: 2020-02-29

## 2020-02-19 NOTE — TELEPHONE ENCOUNTER
----- Message from Shahana Amin sent at 2/19/2020  9:06 AM CST -----  Contact: Hung from [facility] [phone]  Type:  Pharmacy Calling to Clarify an RX    Name of Caller: Ede     Pharmacy Name: [facility]     Prescription Name: butorphanol (STADOL) 10 mg/mL nasal spray    What do they need to clarify? Hung from [facility] is requesting a call back from the staff in regards to the patient's medications. Its about the medication: butorphanol (STADOL) 10 mg/mL nasal spray . He stated that the patient just got her pain medication in January and also is on xanax. He just wants to make sure that Dr. Sorenson was aware of this.    Can you be contacted via [facility]?call back     Best Call Back Number: [phone]

## 2020-02-19 NOTE — TELEPHONE ENCOUNTER
Last Office Visit Info:   The patient's last visit with Staci Sorenson MD was on 2/18/2020.    The patient's last visit in current department was on 2/18/2020.    Last refills         Last CBC Results:   Lab Results   Component Value Date    WBC 6.50 12/04/2019    HGB 12.0 12/04/2019    HCT 38.3 12/04/2019     12/04/2019       Last CMP Results  Lab Results   Component Value Date     12/04/2019    K 4.5 12/04/2019     12/04/2019    CO2 25 12/04/2019    BUN 13 12/04/2019    CREATININE 0.78 12/04/2019    CALCIUM 9.1 12/04/2019    ALBUMIN 4.1 12/04/2019    AST 29 12/04/2019    ALT 17 12/04/2019       Last Lipids  Lab Results   Component Value Date    CHOL 235 (H) 12/04/2019    TRIG 109 12/04/2019    HDL 51 12/04/2019    LDLCALC 162.2 (H) 12/04/2019       Last A1C  No results found for: HGBA1C    Last TSH  Lab Results   Component Value Date    TSH 1.290 12/04/2019         Current Med Refills  Medication List with Changes/Refills   Current Medications    ALPRAZOLAM (XANAX) 0.5 MG TABLET    Take 1 tablet (0.5 mg total) by mouth 2 (two) times daily.       Start Date: 2/26/2020 End Date: --    BUTORPHANOL (STADOL) 10 MG/ML NASAL SPRAY    1 spray by Nasal route every 4 (four) hours as needed for Pain.       Start Date: 2/19/2020 End Date: 2/29/2020    DESVENLAFAXINE SUCCINATE (PRISTIQ) 100 MG TB24    Take 1 tablet (100 mg total) by mouth once daily.       Start Date: 2/18/2020 End Date: 2/17/2021    FERROUS SULFATE (IRON) 325 MG (65 MG IRON) CPSR    Take by mouth.       Start Date: --        End Date: --    GABAPENTIN (NEURONTIN) 300 MG CAPSULE    TAKE 3 CAPSULES (900 MG TOTAL) BY MOUTH EVERY EVENING.       Start Date: 8/5/2019  End Date: --    PANTOPRAZOLE (PROTONIX) 40 MG TABLET    Take 1 tablet (40 mg total) by mouth once daily.       Start Date: 8/28/2019 End Date: --    TIZANIDINE (ZANAFLEX) 2 MG TABLET    TAKE 1 TABLET BY MOUTH AT BEDTIME AS NEEDED FOR SPASM(S)       Start Date: 9/17/2018 End  Date: --    TOPIRAMATE (TOPAMAX) 100 MG TABLET    TAKE 1 TABLET (100 MG TOTAL) BY MOUTH EVERY EVENING.       Start Date: 8/1/2019  End Date: --    ZOLPIDEM (AMBIEN) 10 MG TAB    Take 1 tablet (10 mg total) by mouth nightly as needed.       Start Date: 8/28/2019 End Date: 2/26/2020

## 2020-02-19 NOTE — ADDENDUM NOTE
Addended by: KAROL GIPSON on: 2/19/2020 08:47 AM     Modules accepted: Orders     Patient calling stating he has a lemtrada infusion coming up.    He is questioning if that will be fully paid by his insurance.    He states the nurse is always able to tell him if it is covered or not.    Please give patient a call to discuss.

## 2020-02-21 ENCOUNTER — TELEPHONE (OUTPATIENT)
Dept: FAMILY MEDICINE | Facility: CLINIC | Age: 40
End: 2020-02-21

## 2020-02-21 RX ORDER — GABAPENTIN 300 MG/1
CAPSULE ORAL
Qty: 90 CAPSULE | Refills: 8 | OUTPATIENT
Start: 2020-02-21

## 2020-02-21 RX ORDER — ZOLPIDEM TARTRATE 10 MG/1
10 TABLET ORAL NIGHTLY PRN
Qty: 30 TABLET | Refills: 5 | OUTPATIENT
Start: 2020-02-21 | End: 2020-08-21

## 2020-02-21 RX ORDER — PANTOPRAZOLE SODIUM 40 MG/1
40 TABLET, DELAYED RELEASE ORAL DAILY
Qty: 30 TABLET | Refills: 5 | OUTPATIENT
Start: 2020-02-21

## 2020-02-21 RX ORDER — TOPIRAMATE 100 MG/1
TABLET, FILM COATED ORAL
Qty: 90 TABLET | Refills: 1 | OUTPATIENT
Start: 2020-02-21

## 2020-02-21 NOTE — TELEPHONE ENCOUNTER
----- Message from Tanya Amin sent at 2/21/2020  2:59 PM CST -----  Contact: Hung/ [facility]/  [phone]  Type: Patient Call Back    Who called:  Patient    What is the request in detail:  Pharmacy would like Dr Sorenson to give them a call, regarding patients medication butorphanol (STADOL) 10 mg/mL nasal spray.  Thank you    Would the patient rather a call back or a response via [facility]?  Call back    Best call back number:  [phone]

## 2020-02-24 RX ORDER — PANTOPRAZOLE SODIUM 40 MG/1
40 TABLET, DELAYED RELEASE ORAL DAILY
Qty: 30 TABLET | Refills: 5 | Status: CANCELLED | OUTPATIENT
Start: 2020-02-24

## 2020-02-24 NOTE — TELEPHONE ENCOUNTER
Last Office Visit Info:   The patient's last visit with Staci Sorenson MD was on 2/18/2020.    The patient's last visit in current department was on 2/18/2020.        Last CBC Results:   Lab Results   Component Value Date    WBC 6.50 12/04/2019    HGB 12.0 12/04/2019    HCT 38.3 12/04/2019     12/04/2019       Last CMP Results  Lab Results   Component Value Date     12/04/2019    K 4.5 12/04/2019     12/04/2019    CO2 25 12/04/2019    BUN 13 12/04/2019    CREATININE 0.78 12/04/2019    CALCIUM 9.1 12/04/2019    ALBUMIN 4.1 12/04/2019    AST 29 12/04/2019    ALT 17 12/04/2019       Last Lipids  Lab Results   Component Value Date    CHOL 235 (H) 12/04/2019    TRIG 109 12/04/2019    HDL 51 12/04/2019    LDLCALC 162.2 (H) 12/04/2019       Last A1C  No results found for: HGBA1C    Last TSH  Lab Results   Component Value Date    TSH 1.290 12/04/2019         Current Med Refills  Medication List with Changes/Refills   Current Medications    DESVENLAFAXINE SUCCINATE (PRISTIQ) 100 MG TB24    Take 1 tablet (100 mg total) by mouth once daily.       Start Date: 2/18/2020 End Date: 2/17/2021    FERROUS SULFATE (IRON) 325 MG (65 MG IRON) CPSR    Take by mouth.       Start Date: --        End Date: --    GABAPENTIN (NEURONTIN) 300 MG CAPSULE    TAKE 3 CAPSULES (900 MG TOTAL) BY MOUTH EVERY EVENING.       Start Date: 8/5/2019  End Date: --    PANTOPRAZOLE (PROTONIX) 40 MG TABLET    Take 1 tablet (40 mg total) by mouth once daily.       Start Date: 8/28/2019 End Date: --    TIZANIDINE (ZANAFLEX) 2 MG TABLET    TAKE 1 TABLET BY MOUTH AT BEDTIME AS NEEDED FOR SPASM(S)       Start Date: 9/17/2018 End Date: --    TOPIRAMATE (TOPAMAX) 100 MG TABLET    TAKE 1 TABLET (100 MG TOTAL) BY MOUTH EVERY EVENING.       Start Date: 8/1/2019  End Date: --    ZOLPIDEM (AMBIEN) 10 MG TAB    Take 1 tablet (10 mg total) by mouth nightly as needed.       Start Date: 8/28/2019 End Date: 2/26/2020   Changed and/or Refilled Medications     Modified Medication Previous Medication    ALPRAZOLAM (XANAX) 0.5 MG TABLET ALPRAZolam (XANAX) 0.5 MG tablet       Take 1 tablet (0.5 mg total) by mouth 2 (two) times daily.    Take 1 tablet (0.5 mg total) by mouth 2 (two) times daily.       Start Date: 2/26/2020 End Date: --    Start Date: 2/26/2020 End Date: 2/19/2020    BUTORPHANOL (STADOL) 10 MG/ML NASAL SPRAY butorphanol (STADOL) 10 mg/mL nasal spray       1 spray by Nasal route every 4 (four) hours as needed for Pain.    1 spray by Nasal route every 4 (four) hours as needed for Pain.       Start Date: 2/19/2020 End Date: 2/29/2020    Start Date: 2/18/2020 End Date: 2/19/2020       Order(s) placed per written order guidelines:     Please advise.

## 2020-02-26 ENCOUNTER — PATIENT MESSAGE (OUTPATIENT)
Dept: FAMILY MEDICINE | Facility: CLINIC | Age: 40
End: 2020-02-26

## 2020-02-26 DIAGNOSIS — G43.009 MIGRAINE WITHOUT AURA AND WITHOUT STATUS MIGRAINOSUS, NOT INTRACTABLE: ICD-10-CM

## 2020-02-26 DIAGNOSIS — K21.9 GASTROESOPHAGEAL REFLUX DISEASE, ESOPHAGITIS PRESENCE NOT SPECIFIED: ICD-10-CM

## 2020-02-26 DIAGNOSIS — G47.00 INSOMNIA, UNSPECIFIED TYPE: ICD-10-CM

## 2020-02-26 RX ORDER — PANTOPRAZOLE SODIUM 40 MG/1
40 TABLET, DELAYED RELEASE ORAL DAILY
Qty: 30 TABLET | Refills: 5 | Status: CANCELLED | OUTPATIENT
Start: 2020-02-26

## 2020-02-26 RX ORDER — GABAPENTIN 300 MG/1
CAPSULE ORAL
Qty: 90 CAPSULE | Refills: 8 | Status: CANCELLED | OUTPATIENT
Start: 2020-02-26

## 2020-02-26 NOTE — TELEPHONE ENCOUNTER
----- Message from Lucía Goodwin sent at 2/26/2020  8:37 AM CST -----  Contact: GENARO BLANTON   Can the clinic reply in [facility]: no      Please refill the medication(s) listed below. Please call the patient when the prescription(s) is ready for  at this phone number   [phone]      Medication #1 topiramate (TOPAMAX) 100 MG tablet    Medication #2 gabapentin (NEURONTIN) 300 MG capsule    Medication #3 zolpidem (AMBIEN) 10 mg Tab    Medication #4 pantoprazole (PROTONIX) 40 MG tablet      Preferred Pharmacy: [facility]

## 2020-02-28 RX ORDER — TOPIRAMATE 100 MG/1
100 TABLET, FILM COATED ORAL DAILY
Qty: 90 TABLET | Refills: 1 | Status: SHIPPED | OUTPATIENT
Start: 2020-02-28 | End: 2020-03-30 | Stop reason: SDUPTHER

## 2020-02-28 RX ORDER — ZOLPIDEM TARTRATE 10 MG/1
10 TABLET ORAL NIGHTLY PRN
Qty: 30 TABLET | Refills: 5 | Status: SHIPPED | OUTPATIENT
Start: 2020-02-28 | End: 2020-07-27 | Stop reason: SDUPTHER

## 2020-02-28 RX ORDER — ZOLPIDEM TARTRATE 10 MG/1
10 TABLET ORAL NIGHTLY PRN
Qty: 30 TABLET | Refills: 5 | OUTPATIENT
Start: 2020-02-28 | End: 2020-08-28

## 2020-02-28 RX ORDER — TOPIRAMATE 100 MG/1
TABLET, FILM COATED ORAL
Qty: 90 TABLET | Refills: 1 | OUTPATIENT
Start: 2020-02-28

## 2020-02-28 RX ORDER — ALPRAZOLAM 0.5 MG/1
TABLET ORAL
Refills: 0 | OUTPATIENT
Start: 2020-02-28

## 2020-02-28 RX ORDER — PANTOPRAZOLE SODIUM 40 MG/1
40 TABLET, DELAYED RELEASE ORAL DAILY
Qty: 30 TABLET | Refills: 5 | Status: SHIPPED | OUTPATIENT
Start: 2020-02-28 | End: 2020-03-30 | Stop reason: SDUPTHER

## 2020-02-28 RX ORDER — TOPIRAMATE 100 MG/1
TABLET, FILM COATED ORAL
Refills: 0 | OUTPATIENT
Start: 2020-02-28

## 2020-02-28 RX ORDER — GABAPENTIN 300 MG/1
CAPSULE ORAL
Qty: 90 CAPSULE | Refills: 8 | Status: SHIPPED | OUTPATIENT
Start: 2020-02-28 | End: 2020-03-30 | Stop reason: SDUPTHER

## 2020-03-04 DIAGNOSIS — M62.838 MUSCLE SPASM: ICD-10-CM

## 2020-03-05 RX ORDER — TIZANIDINE 2 MG/1
TABLET ORAL
Qty: 30 TABLET | Refills: 2 | Status: SHIPPED | OUTPATIENT
Start: 2020-03-05 | End: 2020-03-30 | Stop reason: SDUPTHER

## 2020-03-30 DIAGNOSIS — G43.009 MIGRAINE WITHOUT AURA AND WITHOUT STATUS MIGRAINOSUS, NOT INTRACTABLE: ICD-10-CM

## 2020-03-30 DIAGNOSIS — M62.838 MUSCLE SPASM: ICD-10-CM

## 2020-03-30 DIAGNOSIS — K21.9 GASTROESOPHAGEAL REFLUX DISEASE, ESOPHAGITIS PRESENCE NOT SPECIFIED: ICD-10-CM

## 2020-03-30 RX ORDER — GABAPENTIN 300 MG/1
CAPSULE ORAL
Qty: 90 CAPSULE | Refills: 2 | Status: SHIPPED | OUTPATIENT
Start: 2020-03-30 | End: 2020-06-08

## 2020-03-30 RX ORDER — TOPIRAMATE 100 MG/1
100 TABLET, FILM COATED ORAL DAILY
Qty: 90 TABLET | Refills: 1 | Status: SHIPPED | OUTPATIENT
Start: 2020-03-30 | End: 2020-07-20 | Stop reason: SDUPTHER

## 2020-03-30 RX ORDER — PANTOPRAZOLE SODIUM 40 MG/1
40 TABLET, DELAYED RELEASE ORAL DAILY
Qty: 30 TABLET | Refills: 5 | Status: SHIPPED | OUTPATIENT
Start: 2020-03-30 | End: 2020-07-20 | Stop reason: SDUPTHER

## 2020-03-30 RX ORDER — TIZANIDINE 2 MG/1
TABLET ORAL
Qty: 30 TABLET | Refills: 2 | Status: SHIPPED | OUTPATIENT
Start: 2020-03-30 | End: 2020-06-08

## 2020-03-30 NOTE — TELEPHONE ENCOUNTER
Last Office Visit Info:   The patient's last visit with Staci Sorenson MD was on 2/18/2020.    The patient's last visit in current department was on 2/18/2020.        Last CBC Results:   Lab Results   Component Value Date    WBC 6.50 12/04/2019    HGB 12.0 12/04/2019    HCT 38.3 12/04/2019     12/04/2019       Last CMP Results  Lab Results   Component Value Date     12/04/2019    K 4.5 12/04/2019     12/04/2019    CO2 25 12/04/2019    BUN 13 12/04/2019    CREATININE 0.78 12/04/2019    CALCIUM 9.1 12/04/2019    ALBUMIN 4.1 12/04/2019    AST 29 12/04/2019    ALT 17 12/04/2019       Last Lipids  Lab Results   Component Value Date    CHOL 235 (H) 12/04/2019    TRIG 109 12/04/2019    HDL 51 12/04/2019    LDLCALC 162.2 (H) 12/04/2019       Last A1C  No results found for: HGBA1C    Last TSH  Lab Results   Component Value Date    TSH 1.290 12/04/2019         Current Med Refills  Medication List with Changes/Refills   Current Medications    ALPRAZOLAM (XANAX) 0.5 MG TABLET    Take 1 tablet (0.5 mg total) by mouth 2 (two) times daily.       Start Date: 2/26/2020 End Date: --    DESVENLAFAXINE SUCCINATE (PRISTIQ) 100 MG TB24    Take 1 tablet (100 mg total) by mouth once daily.       Start Date: 2/18/2020 End Date: 2/17/2021    FERROUS SULFATE (IRON) 325 MG (65 MG IRON) CPSR    Take by mouth.       Start Date: --        End Date: --    GABAPENTIN (NEURONTIN) 300 MG CAPSULE    TAKE 3 CAPSULES (900 MG TOTAL) BY MOUTH EVERY EVENING.       Start Date: 2/28/2020 End Date: --    PANTOPRAZOLE (PROTONIX) 40 MG TABLET    Take 1 tablet (40 mg total) by mouth once daily.       Start Date: 2/28/2020 End Date: --    TIZANIDINE (ZANAFLEX) 2 MG TABLET    TAKE 1 TABLET BY MOUTH AT BEDTIME AS NEEDED FOR SPASM(S)       Start Date: 3/5/2020  End Date: --    TOPIRAMATE (TOPAMAX) 100 MG TABLET    Take 1 tablet (100 mg total) by mouth once daily.       Start Date: 2/28/2020 End Date: --    ZOLPIDEM (AMBIEN) 10 MG TAB    Take 1  tablet (10 mg total) by mouth nightly as needed.       Start Date: 2/28/2020 End Date: 8/28/2020       Order(s) placed per written order guidelines:     Please advise.

## 2020-06-22 ENCOUNTER — TELEPHONE (OUTPATIENT)
Dept: FAMILY MEDICINE | Facility: CLINIC | Age: 40
End: 2020-06-22

## 2020-06-22 ENCOUNTER — OFFICE VISIT (OUTPATIENT)
Dept: URGENT CARE | Facility: CLINIC | Age: 40
End: 2020-06-22
Payer: COMMERCIAL

## 2020-06-22 VITALS
HEART RATE: 80 BPM | BODY MASS INDEX: 39.2 KG/M2 | WEIGHT: 213 LBS | TEMPERATURE: 98 F | OXYGEN SATURATION: 99 % | HEIGHT: 62 IN | SYSTOLIC BLOOD PRESSURE: 137 MMHG | DIASTOLIC BLOOD PRESSURE: 89 MMHG

## 2020-06-22 DIAGNOSIS — R30.0 DYSURIA: Primary | ICD-10-CM

## 2020-06-22 PROBLEM — G89.29 CHRONIC PAIN: Status: RESOLVED | Noted: 2019-06-28 | Resolved: 2020-06-22

## 2020-06-22 PROBLEM — G43.019 INTRACTABLE MIGRAINE WITHOUT AURA AND WITHOUT STATUS MIGRAINOSUS: Status: ACTIVE | Noted: 2020-06-22

## 2020-06-22 PROBLEM — F33.41 RECURRENT MAJOR DEPRESSIVE DISORDER, IN PARTIAL REMISSION: Status: ACTIVE | Noted: 2020-06-22

## 2020-06-22 LAB
BILIRUB UR QL STRIP: NEGATIVE
GLUCOSE UR QL STRIP: NEGATIVE
KETONES UR QL STRIP: NEGATIVE
LEUKOCYTE ESTERASE UR QL STRIP: NEGATIVE
PH, POC UA: 8
POC BLOOD, URINE: NEGATIVE
POC NITRATES, URINE: NEGATIVE
PROT UR QL STRIP: NEGATIVE
SP GR UR STRIP: 1 (ref 1–1.03)
UROBILINOGEN UR STRIP-ACNC: NORMAL (ref 0.1–1.1)

## 2020-06-22 PROCEDURE — 81003 URINALYSIS AUTO W/O SCOPE: CPT | Mod: QW,S$GLB,, | Performed by: NURSE PRACTITIONER

## 2020-06-22 PROCEDURE — 99214 OFFICE O/P EST MOD 30 MIN: CPT | Mod: 25,S$GLB,, | Performed by: NURSE PRACTITIONER

## 2020-06-22 PROCEDURE — 81003 POCT URINALYSIS, DIPSTICK, AUTOMATED, W/O SCOPE: ICD-10-PCS | Mod: QW,S$GLB,, | Performed by: NURSE PRACTITIONER

## 2020-06-22 PROCEDURE — 99214 PR OFFICE/OUTPT VISIT, EST, LEVL IV, 30-39 MIN: ICD-10-PCS | Mod: 25,S$GLB,, | Performed by: NURSE PRACTITIONER

## 2020-06-22 RX ORDER — CIPROFLOXACIN 500 MG/1
500 TABLET ORAL 2 TIMES DAILY
Qty: 6 TABLET | Refills: 0 | Status: SHIPPED | OUTPATIENT
Start: 2020-06-22 | End: 2020-06-25

## 2020-06-22 RX ORDER — PHENAZOPYRIDINE HYDROCHLORIDE 200 MG/1
200 TABLET, FILM COATED ORAL 3 TIMES DAILY PRN
Qty: 6 TABLET | Refills: 0 | Status: SHIPPED | OUTPATIENT
Start: 2020-06-22 | End: 2020-06-24

## 2020-06-22 NOTE — TELEPHONE ENCOUNTER
----- Message from Amy Diaz sent at 6/22/2020  9:47 AM CDT -----  Type: Patient Call Back    Who called: Self     What is the request in detail: patient states she would like orders to do a urine sample she has smelling urine, bladder pain and color change. Please call     Can the clinic reply by [facility]? No     Would the patient rather a call back or a response via [facility]?  Call     Best call back number: [phone]

## 2020-06-23 NOTE — PATIENT INSTRUCTIONS

## 2020-06-23 NOTE — PROGRESS NOTES
"Subjective:       Patient ID: Sarah Mukherjee is a 40 y.o. female.    Vitals:  height is 5' 2" (1.575 m) and weight is 96.6 kg (213 lb). Her tympanic temperature is 98 °F (36.7 °C). Her blood pressure is 137/89 and her pulse is 80. Her oxygen saturation is 99%.     Chief Complaint: Dysuria    Patient self-catheterizes.     Dysuria   This is a new problem. The current episode started today. The problem occurs every urination. The problem has been gradually worsening. The quality of the pain is described as stabbing and aching. The pain is at a severity of 5/10. The pain is moderate. There has been no fever. She is sexually active. There is no history of pyelonephritis. Pertinent negatives include no chills, frequency, hematuria, nausea, urgency, vomiting or rash. She has tried increased fluids for the symptoms. The treatment provided mild relief. Her past medical history is significant for catheterization.       Constitution: Negative for chills and fever.   Neck: Negative for painful lymph nodes.   Gastrointestinal: Positive for abdominal pain. Negative for nausea and vomiting.   Genitourinary: Negative for dysuria, frequency, urgency, urine decreased, hematuria, history of kidney stones, painful menstruation, irregular menstruation, missed menses, heavy menstrual bleeding, ovarian cysts, genital trauma, vaginal pain, vaginal discharge, vaginal bleeding, vaginal odor, painful intercourse, genital sore, painful ejaculation and pelvic pain.   Musculoskeletal: Positive for back pain.   Skin: Negative for rash and lesion.   Hematologic/Lymphatic: Negative for swollen lymph nodes.       Objective:      Physical Exam   Constitutional: She is oriented to person, place, and time. She appears well-developed.   HENT:   Head: Normocephalic and atraumatic.   Right Ear: External ear normal.   Left Ear: External ear normal.   Nose: Nose normal.   Eyes: Conjunctivae and lids are normal.   Neck: Trachea normal and full passive " range of motion without pain. Neck supple.   Cardiovascular: Normal rate, regular rhythm and normal heart sounds.   Pulmonary/Chest: Effort normal and breath sounds normal. No respiratory distress.   Abdominal: Soft. Normal appearance and bowel sounds are normal. She exhibits no distension, no abdominal bruit, no pulsatile midline mass and no mass. There is no splenomegaly or hepatomegaly. There is abdominal tenderness in the suprapubic area. There is no rigidity, no rebound and no guarding.   Musculoskeletal: Normal range of motion.   Neurological: She is alert and oriented to person, place, and time. She has normal strength. She is not disoriented.   Skin: Skin is warm, dry, intact, not diaphoretic and not pale.   Psychiatric: Her speech is normal and behavior is normal. Judgment and thought content normal.   Nursing note and vitals reviewed.        Assessment:       1. Dysuria        Plan:     States she has had Pyridium in the past with no issues.   Results for orders placed or performed in visit on 06/22/20   POCT Urinalysis, Dipstick, Automated, W/O Scope   Result Value Ref Range    POC Blood, Urine Negative Negative    POC Bilirubin, Urine Negative Negative    POC Urobilinogen, Urine norm 0.1 - 1.1    POC Ketones, Urine Negative Negative    POC Protein, Urine Negative Negative    POC Nitrates, Urine Negative Negative    POC Glucose, Urine Negative Negative    pH, UA 8.0     POC Specific Gravity, Urine 1.005 1.003 - 1.029    POC Leukocytes, Urine Negative Negative         Dysuria  -     POCT Urinalysis, Dipstick, Automated, W/O Scope  -     Urine culture      Patient Instructions     Please follow up with your Primary care provider within 2-5 days if your signs and symptoms have not resolved or worsen.     If your condition worsens or fails to improve we recommend that you receive another evaluation at the emergency room immediately or contact your primary medical clinic to discuss your concerns.    You must  understand that you have received an Urgent Care treatment only and that you may be released before all of your medical problems are known or treated.   You, the patient, will arrange for follow up care as instructed.       Urinary Tract Infection    If your condition worsens or fails to improve we recommend that you receive another evaluation at the emergency room immediately or contact your primary medical clinic to discuss your concerns.    You must understand that you have received an Urgent Care treatment only and that you may be released before all of your medical problems are known or treated.     You, the patient, will arrange for follow up care as instructed.   You have been given an antibiotic to treat your condition today.  Please complete the antibiotic as directed on the bottle.     If you are female and on BCP use additional methods to prevent pregnancy while on antibiotics and for one cycle after.     You may take AZO for discomfort as directed on box. Take after a meal.  Use NO MORE than 2 full days as this can mask worsening symptoms.      If a culture was sent off:  We should receive results within 4 business days of culture.  If you are not contacted by the afternoon of the 4th day, please contact us for your results.          Dysuria     Painful urination (dysuria) is often caused by a problem in the urinary tract.   Dysuria is pain felt during urination. It is often described as a burning. Learn more about this problem and how it can be treated.  What causes dysuria?  Possible causes include:  · Infection with a bacteria or virus such as a urinary tract infection (UTI or a sexually transmitted infection (STI)  · Sensitivity or allergy to chemicals such as those found in lotions and other products  · Prostate or bladder problems  · Radiation therapy to the pelvic area  How is dysuria diagnosed?  Your healthcare provider will examine you. He or she will ask about your symptoms and health. After  "talking with you and doing a physical exam, your healthcare provider may know what is causing your dysuria. He or she will usually request  a sample of your urine. Tests of your urine, or a "urinalysis," are done. A urinalysis may include:  · Looking at the urine sample (visual exam)  · Checking for substances (chemical exam)  · Looking at a small amount under a microscope (microscopic exam)  Some parts of the urinalysis may be done in the provider's office and some in a lab. And, the urine sample may be checked for bacteria and yeast (urine culture). Your healthcare provider will tell you more about these tests if they are needed.  How is dysuria treated?  Treatment depends on the cause. If you have a bacterial infection, you may need antibiotics. You may be given medicines to make it easier for you to urinate and help relieve pain. Your healthcare provider can tell you more about your treatment options. Untreated, symptoms may get worse.  When to call your healthcare provider  Call the healthcare provider right away if you have any of the following:  · Fever of 100.4°F (38°C) or higher   · No improvement after three days of treatment  · Trouble urinating because of pain  · New or increased discharge from the vagina or penis  · Rash or joint pain  · Increased back or abdominal pain  · Enlarged painful lymph nodes (lumps) in the groin   Date Last Reviewed: 1/1/2017  © 4092-6503 [employer]. [address] [place] [address]. All rights reserved. This information is not intended as a substitute for professional medical care. Always follow your healthcare professional's instructions.                 "

## 2020-06-26 ENCOUNTER — TELEPHONE (OUTPATIENT)
Dept: URGENT CARE | Facility: CLINIC | Age: 40
End: 2020-06-26

## 2020-06-26 LAB
BACTERIA UR CULT: NORMAL
BACTERIA UR CULT: NORMAL

## 2020-06-27 NOTE — TELEPHONE ENCOUNTER
1st attempt. Left message for patient to call back for lab results.    ----- Message from Ortiz Andrade NP sent at 6/26/2020  6:44 PM CDT -----  Please call this patient and let her know that the urine culture was negative for specific bacteria.  If her symptoms continue, please have follow-up with Urology or her PCP.  Thank you.

## 2020-07-17 DIAGNOSIS — Z71.89 COMPLEX CARE COORDINATION: ICD-10-CM

## 2020-07-20 DIAGNOSIS — M62.838 MUSCLE SPASM: ICD-10-CM

## 2020-07-20 DIAGNOSIS — G47.00 INSOMNIA, UNSPECIFIED TYPE: ICD-10-CM

## 2020-07-20 DIAGNOSIS — K21.9 GASTROESOPHAGEAL REFLUX DISEASE, ESOPHAGITIS PRESENCE NOT SPECIFIED: ICD-10-CM

## 2020-07-20 DIAGNOSIS — G43.009 MIGRAINE WITHOUT AURA AND WITHOUT STATUS MIGRAINOSUS, NOT INTRACTABLE: ICD-10-CM

## 2020-07-20 DIAGNOSIS — F41.9 ANXIETY: ICD-10-CM

## 2020-07-21 RX ORDER — GABAPENTIN 300 MG/1
900 CAPSULE ORAL NIGHTLY
Qty: 90 CAPSULE | Refills: 0 | Status: SHIPPED | OUTPATIENT
Start: 2020-07-21 | End: 2020-08-07 | Stop reason: SDUPTHER

## 2020-07-21 RX ORDER — PANTOPRAZOLE SODIUM 40 MG/1
40 TABLET, DELAYED RELEASE ORAL DAILY
Qty: 30 TABLET | Refills: 5 | Status: SHIPPED | OUTPATIENT
Start: 2020-07-21 | End: 2020-08-07 | Stop reason: SDUPTHER

## 2020-07-21 RX ORDER — TIZANIDINE 2 MG/1
2 TABLET ORAL NIGHTLY
Qty: 30 TABLET | Refills: 0 | Status: SHIPPED | OUTPATIENT
Start: 2020-07-21 | End: 2020-08-07 | Stop reason: SDUPTHER

## 2020-07-21 RX ORDER — TOPIRAMATE 100 MG/1
100 TABLET, FILM COATED ORAL DAILY
Qty: 90 TABLET | Refills: 1 | Status: SHIPPED | OUTPATIENT
Start: 2020-07-21 | End: 2020-08-07 | Stop reason: SDUPTHER

## 2020-07-21 NOTE — TELEPHONE ENCOUNTER
Last Office Visit Info:   The patient's last visit with Staci Sorenson MD was on 2/18/2020    The patient's last visit in current department was on 2/18/2020.        Last CBC Results:   Lab Results   Component Value Date    WBC 6.50 12/04/2019    HGB 12.0 12/04/2019    HCT 38.3 12/04/2019     12/04/2019       Last CMP Results  Lab Results   Component Value Date     12/04/2019    K 4.5 12/04/2019     12/04/2019    CO2 25 12/04/2019    BUN 13 12/04/2019    CREATININE 0.78 12/04/2019    CALCIUM 9.1 12/04/2019    ALBUMIN 4.1 12/04/2019    AST 29 12/04/2019    ALT 17 12/04/2019       Last Lipids  Lab Results   Component Value Date    CHOL 235 (H) 12/04/2019    TRIG 109 12/04/2019    HDL 51 12/04/2019    LDLCALC 162.2 (H) 12/04/2019       Last A1C  No results found for: HGBA1C    Last TSH  Lab Results   Component Value Date    TSH 1.290 12/04/2019         Current Med Refills  Medication List with Changes/Refills   Current Medications    ALPRAZOLAM (XANAX) 0.5 MG TABLET    Take 1 tablet (0.5 mg total) by mouth 2 (two) times daily.       Start Date: 2/26/2020 End Date: --    DESVENLAFAXINE SUCCINATE (PRISTIQ) 100 MG TB24    Take 1 tablet (100 mg total) by mouth once daily.       Start Date: 2/18/2020 End Date: 2/17/2021    FERROUS SULFATE (IRON) 325 MG (65 MG IRON) CPSR    Take by mouth.       Start Date: --        End Date: --    GABAPENTIN (NEURONTIN) 300 MG CAPSULE    TAKE 3 CAPSULES EVERY EVENING       Start Date: 7/9/2020  End Date: --    PANTOPRAZOLE (PROTONIX) 40 MG TABLET    Take 1 tablet (40 mg total) by mouth once daily.       Start Date: 3/30/2020 End Date: --    TIZANIDINE (ZANAFLEX) 2 MG TABLET    TAKE 1 TABLET AT BEDTIME       Start Date: 7/9/2020  End Date: --    TOPIRAMATE (TOPAMAX) 100 MG TABLET    Take 1 tablet (100 mg total) by mouth once daily.       Start Date: 3/30/2020 End Date: --    ZOLPIDEM (AMBIEN) 10 MG TAB    Take 1 tablet (10 mg total) by mouth nightly as needed.        Start Date: 2/28/2020 End Date: 8/28/2020

## 2020-07-21 NOTE — TELEPHONE ENCOUNTER
Last Office Visit Info:   The patient's last visit with Staci Sorenson MD was on 2/18/2020.    The patient's last visit in current department was on 2/18/2020.        Last CBC Results:   Lab Results   Component Value Date    WBC 6.50 12/04/2019    HGB 12.0 12/04/2019    HCT 38.3 12/04/2019     12/04/2019       Last CMP Results  Lab Results   Component Value Date     12/04/2019    K 4.5 12/04/2019     12/04/2019    CO2 25 12/04/2019    BUN 13 12/04/2019    CREATININE 0.78 12/04/2019    CALCIUM 9.1 12/04/2019    ALBUMIN 4.1 12/04/2019    AST 29 12/04/2019    ALT 17 12/04/2019       Last Lipids  Lab Results   Component Value Date    CHOL 235 (H) 12/04/2019    TRIG 109 12/04/2019    HDL 51 12/04/2019    LDLCALC 162.2 (H) 12/04/2019       Last A1C  No results found for: HGBA1C    Last TSH  Lab Results   Component Value Date    TSH 1.290 12/04/2019         Current Med Refills  Medication List with Changes/Refills   Current Medications    ALPRAZOLAM (XANAX) 0.5 MG TABLET    Take 1 tablet (0.5 mg total) by mouth 2 (two) times daily.       Start Date: 2/26/2020 End Date: --    DESVENLAFAXINE SUCCINATE (PRISTIQ) 100 MG TB24    Take 1 tablet (100 mg total) by mouth once daily.       Start Date: 2/18/2020 End Date: 2/17/2021    FERROUS SULFATE (IRON) 325 MG (65 MG IRON) CPSR    Take by mouth.       Start Date: --        End Date: --    GABAPENTIN (NEURONTIN) 300 MG CAPSULE    TAKE 3 CAPSULES EVERY EVENING       Start Date: 7/9/2020  End Date: --    PANTOPRAZOLE (PROTONIX) 40 MG TABLET    Take 1 tablet (40 mg total) by mouth once daily.       Start Date: 3/30/2020 End Date: --    TIZANIDINE (ZANAFLEX) 2 MG TABLET    TAKE 1 TABLET AT BEDTIME       Start Date: 7/9/2020  End Date: --    TOPIRAMATE (TOPAMAX) 100 MG TABLET    Take 1 tablet (100 mg total) by mouth once daily.       Start Date: 3/30/2020 End Date: --    ZOLPIDEM (AMBIEN) 10 MG TAB    Take 1 tablet (10 mg total) by mouth nightly as needed.        Start Date: 2/28/2020 End Date: 8/28/2020

## 2020-07-23 RX ORDER — ZOLPIDEM TARTRATE 10 MG/1
10 TABLET ORAL NIGHTLY PRN
Qty: 30 TABLET | Refills: 5 | OUTPATIENT
Start: 2020-07-23 | End: 2021-01-21

## 2020-07-23 RX ORDER — ALPRAZOLAM 0.5 MG/1
0.5 TABLET ORAL 2 TIMES DAILY
Qty: 60 TABLET | Refills: 2 | OUTPATIENT
Start: 2020-07-23

## 2020-07-27 ENCOUNTER — OFFICE VISIT (OUTPATIENT)
Dept: FAMILY MEDICINE | Facility: CLINIC | Age: 40
End: 2020-07-27
Payer: COMMERCIAL

## 2020-07-27 DIAGNOSIS — F41.9 ANXIETY: ICD-10-CM

## 2020-07-27 DIAGNOSIS — G47.00 INSOMNIA, UNSPECIFIED TYPE: ICD-10-CM

## 2020-07-27 PROCEDURE — 99214 PR OFFICE/OUTPT VISIT, EST, LEVL IV, 30-39 MIN: ICD-10-PCS | Mod: 95,,, | Performed by: FAMILY MEDICINE

## 2020-07-27 PROCEDURE — 99214 OFFICE O/P EST MOD 30 MIN: CPT | Mod: 95,,, | Performed by: FAMILY MEDICINE

## 2020-07-27 RX ORDER — ZOLPIDEM TARTRATE 10 MG/1
10 TABLET ORAL NIGHTLY PRN
Qty: 30 TABLET | Refills: 5 | Status: SHIPPED | OUTPATIENT
Start: 2020-07-27 | End: 2020-12-08 | Stop reason: SDUPTHER

## 2020-07-27 RX ORDER — ALPRAZOLAM 0.5 MG/1
0.5 TABLET ORAL 2 TIMES DAILY
Qty: 60 TABLET | Refills: 2 | Status: SHIPPED | OUTPATIENT
Start: 2020-07-27 | End: 2020-12-08 | Stop reason: SDUPTHER

## 2020-07-27 NOTE — PROGRESS NOTES
Answers for HPI/ROS submitted by the patient on 7/25/2020   activity change: No  unexpected weight change: No  neck pain: No  hearing loss: No  rhinorrhea: No  trouble swallowing: No  eye discharge: No  visual disturbance: No  chest tightness: No  wheezing: No  chest pain: No  palpitations: No  blood in stool: No  constipation: No  vomiting: No  diarrhea: No  polydipsia: No  polyuria: No  difficulty urinating: No  hematuria: No  menstrual problem: No  dysuria: No  joint swelling: No  arthralgias: Yes  headaches: Yes  weakness: No  confusion: No  dysphoric mood: No

## 2020-07-27 NOTE — PROGRESS NOTES
Subjective:       Patient ID: Sarah Mukherjee is a 40 y.o. female.    Chief Complaint: No chief complaint on file.    The patient location is: [place]  The chief complaint leading to consultation is: medication refills    Visit type: audio      Face to Face time with patient:   20 minutes of total time spent on the encounter, which includes face to face time and non-face to face time preparing to see the patient (eg, review of tests), Obtaining and/or reviewing separately obtained history, Documenting clinical information in the electronic or other health record, Independently interpreting results (not separately reported) and communicating results to the patient/family/caregiver, or Care coordination (not separately reported).         Each patient to whom he or she provides medical services by telemedicine is:  (1) informed of the relationship between the physician and patient and the respective role of any other health care provider with respect to management of the patient; and (2) notified that he or she may decline to receive medical services by telemedicine and may withdraw from such care at any time.    Notes:     40 year old female with the condition listed below presents for refills of her medication. She states she needs medication refills and her emotions are all over the place. She states her  was just laid off. She states it is a hiring freeze. She states she has been stressed out and trying to cope. She states the ambien helps her fall asleep, but she states she doesn't feel anything would help at this point. She states the xanax helps calm her down when she has her moments. It helps calm her down. It helps with her panic attacks when she feels like she can't breathe.       Past Medical History:  No date: Cancer      Comment:  skin cancer removed  No date: Crohn's disease  No date: Degenerative joint disease of spine  No date: Esophagitis  No date: History of stomach ulcers  6/20/2014:  Lumbar facet arthropathy  No date: Neurogenic bladder   Past Surgical History:  No date: BREAST RECONSTRUCTION  No date: HYSTERECTOMY      Comment:  total hysterectomy in 20s due to fibroid, endometriosis  1/4/2019: INJECTION OF ANESTHETIC AGENT AROUND NERVE; Left      Comment:  Procedure: Block, Nerve MEDIAL BRANCH BLOCKS LEFT LUMBAR               L5-S3;  Surgeon: Andra Burt MD;  Location: [facility];  Service: Pain Management;  Laterality: Left;                 1 OF 2  6/28/2019: INJECTION OF JOINT; Left      Comment:  Procedure: INJECTION, JOINT COCCYX;  Surgeon: Andra Burt MD;  Location: [facility];  Service: Pain                Management;  Laterality: Left;  INJECT JOINt & LEFT SIDE  No date: PELVIC LAPAROSCOPY  No date: TONSILLECTOMY  Review of patient's family history indicates:  Problem: No Known Problems      Relation: Father          Age of Onset: (Not Specified)  Problem: Diabetes      Relation: Mother          Age of Onset: (Not Specified)  Problem: Hypertension      Relation: Mother          Age of Onset: (Not Specified)  Problem: Breast cancer      Relation: Maternal Aunt          Age of Onset: 47  Problem: Ovarian cancer      Relation: Maternal Aunt          Age of Onset: (Not Specified)    Social History    Socioeconomic History      Marital status:       Spouse name: Not on file      Number of children: Not on file      Years of education: Not on file      Highest education level: Not on file    Occupational History      Not on file    Social Needs      Financial resource strain: Not hard at all      Food insecurity        Worry: Never true        Inability: Never true      Transportation needs        Medical: No        Non-medical: No    Tobacco Use      Smoking status: Never Smoker      Smokeless tobacco: Never Used    Substance and Sexual Activity      Alcohol use: Yes        Frequency: Never        Drinks per session: Patient refused         Binge frequency: Never        Comment: occ      Drug use: No      Sexual activity: Yes    Lifestyle      Physical activity        Days per week: 4 days        Minutes per session: 40 min      Stress: Very much    Relationships      Social connections        Talks on phone: More than three times a week        Gets together: More than three times a week        Attends Judaism service: Not on file        Active member of club or organization: No        Attends meetings of clubs or organizations: Never        Relationship status:     Other Topics      Concerns:        Not on file    Social History Narrative      Not on file          Review of Systems   Constitutional: Negative for activity change and unexpected weight change.   HENT: Negative for hearing loss, rhinorrhea and trouble swallowing.    Eyes: Negative for discharge and visual disturbance.   Respiratory: Negative for chest tightness and wheezing.    Cardiovascular: Negative for chest pain and palpitations.   Gastrointestinal: Negative for blood in stool, constipation, diarrhea and vomiting.   Endocrine: Negative for polydipsia and polyuria.   Genitourinary: Negative for difficulty urinating, dysuria, hematuria and menstrual problem.   Musculoskeletal: Positive for arthralgias. Negative for joint swelling and neck pain.   Neurological: Positive for headaches. Negative for weakness.   Psychiatric/Behavioral: Negative for confusion and dysphoric mood.         Objective:      Physical Exam    Assessment:       1. Anxiety    2. Insomnia, unspecified type        Plan:       Diagnoses and all orders for this visit:    Anxiety  -     ALPRAZolam (XANAX) 0.5 MG tablet; Take 1 tablet (0.5 mg total) by mouth 2 (two) times daily.    Insomnia, unspecified type  -     zolpidem (AMBIEN) 10 mg Tab; Take 1 tablet (10 mg total) by mouth nightly as needed.

## 2020-08-14 DIAGNOSIS — Z11.59 NEED FOR HEPATITIS C SCREENING TEST: ICD-10-CM

## 2020-10-20 DIAGNOSIS — M62.838 MUSCLE SPASM: ICD-10-CM

## 2020-10-21 NOTE — TELEPHONE ENCOUNTER
Last Office Visit Info:   The patient's last visit with Staci Sorenson MD was on 7/27/2020.    The patient's last visit in current department was on 7/27/2020.        Last CBC Results:   Lab Results   Component Value Date    WBC 6.50 12/04/2019    HGB 12.0 12/04/2019    HCT 38.3 12/04/2019     12/04/2019       Last CMP Results  Lab Results   Component Value Date     12/04/2019    K 4.5 12/04/2019     12/04/2019    CO2 25 12/04/2019    BUN 13 12/04/2019    CREATININE 0.78 12/04/2019    CALCIUM 9.1 12/04/2019    ALBUMIN 4.1 12/04/2019    AST 29 12/04/2019    ALT 17 12/04/2019       Last Lipids  Lab Results   Component Value Date    CHOL 235 (H) 12/04/2019    TRIG 109 12/04/2019    HDL 51 12/04/2019    LDLCALC 162.2 (H) 12/04/2019       Last A1C  No results found for: HGBA1C    Last TSH  Lab Results   Component Value Date    TSH 1.290 12/04/2019         Current Med Refills  Medication List with Changes/Refills   Current Medications    ALPRAZOLAM (XANAX) 0.5 MG TABLET    Take 1 tablet (0.5 mg total) by mouth 2 (two) times daily.       Start Date: 7/27/2020 End Date: --    DESVENLAFAXINE SUCCINATE (PRISTIQ) 100 MG TB24    Take 1 tablet (100 mg total) by mouth once daily.       Start Date: 2/18/2020 End Date: 2/17/2021    FERROUS SULFATE (IRON) 325 MG (65 MG IRON) CPSR    Take by mouth.       Start Date: --        End Date: --    GABAPENTIN (NEURONTIN) 300 MG CAPSULE    Take 3 capsules (900 mg total) by mouth every evening.       Start Date: 8/7/2020  End Date: --    PANTOPRAZOLE (PROTONIX) 40 MG TABLET    Take 1 tablet (40 mg total) by mouth once daily.       Start Date: 8/7/2020  End Date: --    TIZANIDINE (ZANAFLEX) 2 MG TABLET    TAKE 1 TABLET BY MOUTH ONCE DAILY AT  NIGHT       Start Date: 9/22/2020 End Date: --    TOPIRAMATE (TOPAMAX) 100 MG TABLET    Take 1 tablet (100 mg total) by mouth once daily.       Start Date: 8/7/2020  End Date: --    ZOLPIDEM (AMBIEN) 10 MG TAB    Take 1 tablet (10 mg  total) by mouth nightly as needed.       Start Date: 7/27/2020 End Date: 1/25/2021

## 2020-10-22 RX ORDER — TIZANIDINE 2 MG/1
2 TABLET ORAL NIGHTLY
Qty: 30 TABLET | Refills: 0 | Status: SHIPPED | OUTPATIENT
Start: 2020-10-22 | End: 2020-11-19 | Stop reason: SDUPTHER

## 2020-11-13 ENCOUNTER — PES CALL (OUTPATIENT)
Dept: ADMINISTRATIVE | Facility: CLINIC | Age: 40
End: 2020-11-13

## 2020-11-19 DIAGNOSIS — M62.838 MUSCLE SPASM: ICD-10-CM

## 2020-11-19 RX ORDER — TIZANIDINE 2 MG/1
2 TABLET ORAL NIGHTLY
Qty: 30 TABLET | Refills: 0 | Status: SHIPPED | OUTPATIENT
Start: 2020-11-19 | End: 2020-12-21

## 2020-11-19 NOTE — TELEPHONE ENCOUNTER
Last Office Visit Info:   The patient's last visit with Staci Sorenson MD was on 7/27/2020.    The patient's last visit in current department was on 7/27/2020.        Last CBC Results:   Lab Results   Component Value Date    WBC 6.50 12/04/2019    HGB 12.0 12/04/2019    HCT 38.3 12/04/2019     12/04/2019       Last CMP Results  Lab Results   Component Value Date     12/04/2019    K 4.5 12/04/2019     12/04/2019    CO2 25 12/04/2019    BUN 13 12/04/2019    CREATININE 0.78 12/04/2019    CALCIUM 9.1 12/04/2019    ALBUMIN 4.1 12/04/2019    AST 29 12/04/2019    ALT 17 12/04/2019       Last Lipids  Lab Results   Component Value Date    CHOL 235 (H) 12/04/2019    TRIG 109 12/04/2019    HDL 51 12/04/2019    LDLCALC 162.2 (H) 12/04/2019       Last A1C  No results found for: HGBA1C    Last TSH  Lab Results   Component Value Date    TSH 1.290 12/04/2019         Current Med Refills  Medication List with Changes/Refills   Current Medications    ALPRAZOLAM (XANAX) 0.5 MG TABLET    Take 1 tablet (0.5 mg total) by mouth 2 (two) times daily.       Start Date: 7/27/2020 End Date: --    DESVENLAFAXINE SUCCINATE (PRISTIQ) 100 MG TB24    Take 1 tablet (100 mg total) by mouth once daily.       Start Date: 2/18/2020 End Date: 2/17/2021    FERROUS SULFATE (IRON) 325 MG (65 MG IRON) CPSR    Take by mouth.       Start Date: --        End Date: --    GABAPENTIN (NEURONTIN) 300 MG CAPSULE    Take 3 capsules (900 mg total) by mouth every evening.       Start Date: 8/7/2020  End Date: --    PANTOPRAZOLE (PROTONIX) 40 MG TABLET    Take 1 tablet (40 mg total) by mouth once daily.       Start Date: 8/7/2020  End Date: --    TIZANIDINE (ZANAFLEX) 2 MG TABLET    Take 1 tablet (2 mg total) by mouth nightly.       Start Date: 10/22/2020End Date: --    TOPIRAMATE (TOPAMAX) 100 MG TABLET    Take 1 tablet (100 mg total) by mouth once daily.       Start Date: 8/7/2020  End Date: --    ZOLPIDEM (AMBIEN) 10 MG TAB    Take 1 tablet (10 mg  total) by mouth nightly as needed.       Start Date: 7/27/2020 End Date: 1/25/2021

## 2020-11-30 DIAGNOSIS — F41.9 ANXIETY: ICD-10-CM

## 2020-12-01 NOTE — TELEPHONE ENCOUNTER
Last Office Visit Info:   The patient's last visit with Staci Sorenson MD was on 7/27/2020.    The patient's last visit in current department was on 7/27/2020.        Last CBC Results:   Lab Results   Component Value Date    WBC 6.50 12/04/2019    HGB 12.0 12/04/2019    HCT 38.3 12/04/2019     12/04/2019       Last CMP Results  Lab Results   Component Value Date     12/04/2019    K 4.5 12/04/2019     12/04/2019    CO2 25 12/04/2019    BUN 13 12/04/2019    CREATININE 0.78 12/04/2019    CALCIUM 9.1 12/04/2019    ALBUMIN 4.1 12/04/2019    AST 29 12/04/2019    ALT 17 12/04/2019       Last Lipids  Lab Results   Component Value Date    CHOL 235 (H) 12/04/2019    TRIG 109 12/04/2019    HDL 51 12/04/2019    LDLCALC 162.2 (H) 12/04/2019       Last A1C  No results found for: HGBA1C    Last TSH  Lab Results   Component Value Date    TSH 1.290 12/04/2019         Current Med Refills  Medication List with Changes/Refills   Current Medications    ALPRAZOLAM (XANAX) 0.5 MG TABLET    Take 1 tablet (0.5 mg total) by mouth 2 (two) times daily.       Start Date: 7/27/2020 End Date: --    DESVENLAFAXINE SUCCINATE (PRISTIQ) 100 MG TB24    Take 1 tablet (100 mg total) by mouth once daily.       Start Date: 2/18/2020 End Date: 2/17/2021    FERROUS SULFATE (IRON) 325 MG (65 MG IRON) CPSR    Take by mouth.       Start Date: --        End Date: --    GABAPENTIN (NEURONTIN) 300 MG CAPSULE    Take 3 capsules (900 mg total) by mouth every evening.       Start Date: 8/7/2020  End Date: --    PANTOPRAZOLE (PROTONIX) 40 MG TABLET    Take 1 tablet (40 mg total) by mouth once daily.       Start Date: 8/7/2020  End Date: --    TIZANIDINE (ZANAFLEX) 2 MG TABLET    Take 1 tablet (2 mg total) by mouth nightly.       Start Date: 11/19/2020End Date: --    TOPIRAMATE (TOPAMAX) 100 MG TABLET    Take 1 tablet (100 mg total) by mouth once daily.       Start Date: 8/7/2020  End Date: --    ZOLPIDEM (AMBIEN) 10 MG TAB    Take 1 tablet (10 mg  total) by mouth nightly as needed.       Start Date: 7/27/2020 End Date: 1/25/2021

## 2020-12-04 RX ORDER — ALPRAZOLAM 0.5 MG/1
0.5 TABLET ORAL 2 TIMES DAILY
Qty: 60 TABLET | Refills: 2 | OUTPATIENT
Start: 2020-12-04

## 2020-12-08 ENCOUNTER — OFFICE VISIT (OUTPATIENT)
Dept: FAMILY MEDICINE | Facility: CLINIC | Age: 40
End: 2020-12-08
Payer: MEDICARE

## 2020-12-08 DIAGNOSIS — K52.9 CHRONIC DIARRHEA: Primary | ICD-10-CM

## 2020-12-08 DIAGNOSIS — G47.00 INSOMNIA, UNSPECIFIED TYPE: ICD-10-CM

## 2020-12-08 DIAGNOSIS — F41.9 ANXIETY: ICD-10-CM

## 2020-12-08 PROCEDURE — 99214 OFFICE O/P EST MOD 30 MIN: CPT | Mod: 95,,, | Performed by: FAMILY MEDICINE

## 2020-12-08 PROCEDURE — 99214 PR OFFICE/OUTPT VISIT, EST, LEVL IV, 30-39 MIN: ICD-10-PCS | Mod: 95,,, | Performed by: FAMILY MEDICINE

## 2020-12-08 RX ORDER — ZOLPIDEM TARTRATE 10 MG/1
10 TABLET ORAL NIGHTLY PRN
Qty: 30 TABLET | Refills: 5 | Status: SHIPPED | OUTPATIENT
Start: 2021-01-08 | End: 2021-04-27 | Stop reason: SDUPTHER

## 2020-12-08 RX ORDER — ALPRAZOLAM 0.5 MG/1
0.5 TABLET ORAL 2 TIMES DAILY
Qty: 60 TABLET | Refills: 2 | Status: SHIPPED | OUTPATIENT
Start: 2020-12-08 | End: 2021-04-27 | Stop reason: SDUPTHER

## 2020-12-08 NOTE — PROGRESS NOTES
Subjective:       Patient ID: Sarah Mukherjee is a 40 y.o. female.    Chief Complaint: No chief complaint on file.    The patient location is: [place]  The chief complaint leading to consultation is: refills and diarrhea    Visit type: audiovisual    Face to Face time with patient:   10 minutes of total time spent on the encounter, which includes face to face time and non-face to face time preparing to see the patient (eg, review of tests), Obtaining and/or reviewing separately obtained history, Documenting clinical information in the electronic or other health record, Independently interpreting results (not separately reported) and communicating results to the patient/family/caregiver, or Care coordination (not separately reported).         Each patient to whom he or she provides medical services by telemedicine is:  (1) informed of the relationship between the physician and patient and the respective role of any other health care provider with respect to management of the patient; and (2) notified that he or she may decline to receive medical services by telemedicine and may withdraw from such care at any time.    Notes:       40 year old female presents for refills of her medication. She is on xanax for anxiety. She states she takes it as needed, but lately her anxiety is more often than she typically has it.       She had a colonoscopy and EGD yesterday. She has been having diarrhea and stomach pain. She had this done by Dr. Hernandez. She states anything that she eats or drinks causes severe diarrhea. She is in colestipol and has tried imodium. He gave her amitriptyline for IBS, but the pharmacist wouldn't fill it as she is on pristiq and there is a risk of an interaction.       Past Medical History:  No date: Cancer      Comment:  skin cancer removed  No date: Crohn's disease  No date: Degenerative joint disease of spine  No date: Esophagitis  No date: History of stomach ulcers  6/20/2014: Lumbar facet  arthropathy  No date: Neurogenic bladder   Past Surgical History:  No date: BREAST RECONSTRUCTION  No date: HYSTERECTOMY      Comment:  total hysterectomy in 20s due to fibroid, endometriosis  1/4/2019: INJECTION OF ANESTHETIC AGENT AROUND NERVE; Left      Comment:  Procedure: Block, Nerve MEDIAL BRANCH BLOCKS LEFT LUMBAR               L5-S3;  Surgeon: Andra Burt MD;  Location: [facility];  Service: Pain Management;  Laterality: Left;                 1 OF 2  6/28/2019: INJECTION OF JOINT; Left      Comment:  Procedure: INJECTION, JOINT COCCYX;  Surgeon: Andra Burt MD;  Location: [facility];  Service: Pain                Management;  Laterality: Left;  INJECT JOINt & LEFT SIDE  No date: PELVIC LAPAROSCOPY  No date: TONSILLECTOMY  Review of patient's family history indicates:  Problem: No Known Problems      Relation: Father          Age of Onset: (Not Specified)  Problem: Diabetes      Relation: Mother          Age of Onset: (Not Specified)  Problem: Hypertension      Relation: Mother          Age of Onset: (Not Specified)  Problem: Breast cancer      Relation: Maternal Aunt          Age of Onset: 47  Problem: Ovarian cancer      Relation: Maternal Aunt          Age of Onset: (Not Specified)    Social History    Socioeconomic History      Marital status:       Spouse name: Not on file      Number of children: Not on file      Years of education: Not on file      Highest education level: Not on file    Occupational History      Not on file    Social Needs      Financial resource strain: Not hard at all      Food insecurity        Worry: Never true        Inability: Never true      Transportation needs        Medical: No        Non-medical: No    Tobacco Use      Smoking status: Never Smoker      Smokeless tobacco: Never Used    Substance and Sexual Activity      Alcohol use: Yes        Frequency: Never        Drinks per session: Patient refused        Binge  frequency: Never        Comment: occ      Drug use: No      Sexual activity: Yes    Lifestyle      Physical activity        Days per week: 4 days        Minutes per session: 40 min      Stress: Very much    Relationships      Social connections        Talks on phone: More than three times a week        Gets together: More than three times a week        Attends Judaism service: Not on file        Active member of club or organization: No        Attends meetings of clubs or organizations: Never        Relationship status:     Other Topics      Concerns:        Not on file    Social History Narrative      Not on file        Review of Systems   Constitutional: Negative for activity change and unexpected weight change.   HENT: Negative for hearing loss, rhinorrhea and trouble swallowing.    Eyes: Negative for discharge and visual disturbance.   Respiratory: Negative for chest tightness and wheezing.    Cardiovascular: Negative for chest pain and palpitations.   Gastrointestinal: Positive for blood in stool and diarrhea. Negative for constipation and vomiting.   Endocrine: Negative for polydipsia and polyuria.   Genitourinary: Negative for difficulty urinating, dysuria and hematuria.   Musculoskeletal: Negative for arthralgias, joint swelling and neck pain.   Neurological: Positive for headaches. Negative for weakness.   Psychiatric/Behavioral: Negative for confusion and dysphoric mood.         Objective:      Physical Exam    Assessment:       1. Chronic diarrhea    2. Anxiety    3. Insomnia, unspecified type        Plan:       Diagnoses and all orders for this visit:    Chronic diarrhea  Currently being evaluated by GI, Dr. Hernandez. Underwent a colonoscopy and  EGD and awaiting results. Consider viberzi for IBS-Diarrhea as imodium and colestipol are not working.     Anxiety  -     ALPRAZolam (XANAX) 0.5 MG tablet; Take 1 tablet (0.5 mg total) by mouth 2 (two) times daily.  Stable. Refilled meds. Continue  pristiq    Insomnia, unspecified type  -     zolpidem (AMBIEN) 10 mg Tab; Take 1 tablet (10 mg total) by mouth nightly as needed.

## 2020-12-08 NOTE — PROGRESS NOTES
Answers for HPI/ROS submitted by the patient on 12/4/2020   activity change: No  unexpected weight change: No  neck pain: No  hearing loss: No  rhinorrhea: No  trouble swallowing: No  eye discharge: No  visual disturbance: No  chest tightness: No  wheezing: No  chest pain: No  palpitations: No  blood in stool: Yes  constipation: No  vomiting: No  diarrhea: Yes  polydipsia: No  polyuria: No  difficulty urinating: No  hematuria: No  dysuria: No  joint swelling: No  arthralgias: No  headaches: Yes  weakness: No  confusion: No  dysphoric mood: No

## 2020-12-09 ENCOUNTER — PATIENT OUTREACH (OUTPATIENT)
Dept: ADMINISTRATIVE | Facility: HOSPITAL | Age: 40
End: 2020-12-09

## 2020-12-18 DIAGNOSIS — M62.838 MUSCLE SPASM: ICD-10-CM

## 2020-12-18 NOTE — TELEPHONE ENCOUNTER
Last Office Visit Info:   The patient's last visit with Staci Sorenson MD was on 12/8/2020.    The patient's last visit in current department was on 12/8/2020.        Last CBC Results:   Lab Results   Component Value Date    WBC 6.50 12/04/2019    HGB 12.0 12/04/2019    HCT 38.3 12/04/2019     12/04/2019       Last CMP Results  Lab Results   Component Value Date     12/04/2019    K 4.5 12/04/2019     12/04/2019    CO2 25 12/04/2019    BUN 13 12/04/2019    CREATININE 0.78 12/04/2019    CALCIUM 9.1 12/04/2019    ALBUMIN 4.1 12/04/2019    AST 29 12/04/2019    ALT 17 12/04/2019       Last Lipids  Lab Results   Component Value Date    CHOL 235 (H) 12/04/2019    TRIG 109 12/04/2019    HDL 51 12/04/2019    LDLCALC 162.2 (H) 12/04/2019       Last A1C  No results found for: HGBA1C    Last TSH  Lab Results   Component Value Date    TSH 1.290 12/04/2019         Current Med Refills  Medication List with Changes/Refills   Current Medications    ALPRAZOLAM (XANAX) 0.5 MG TABLET    Take 1 tablet (0.5 mg total) by mouth 2 (two) times daily.       Start Date: 12/8/2020 End Date: --    DESVENLAFAXINE SUCCINATE (PRISTIQ) 100 MG TB24    Take 1 tablet (100 mg total) by mouth once daily.       Start Date: 2/18/2020 End Date: 2/17/2021    FERROUS SULFATE (IRON) 325 MG (65 MG IRON) CPSR    Take by mouth.       Start Date: --        End Date: --    GABAPENTIN (NEURONTIN) 300 MG CAPSULE    Take 3 capsules (900 mg total) by mouth every evening.       Start Date: 8/7/2020  End Date: --    PANTOPRAZOLE (PROTONIX) 40 MG TABLET    Take 1 tablet (40 mg total) by mouth once daily.       Start Date: 8/7/2020  End Date: --    TIZANIDINE (ZANAFLEX) 2 MG TABLET    Take 1 tablet (2 mg total) by mouth nightly.       Start Date: 11/19/2020End Date: --    TOPIRAMATE (TOPAMAX) 100 MG TABLET    Take 1 tablet (100 mg total) by mouth once daily.       Start Date: 8/7/2020  End Date: --    ZOLPIDEM (AMBIEN) 10 MG TAB    Take 1 tablet (10 mg  total) by mouth nightly as needed.       Start Date: 1/8/2021  End Date: 7/9/2021

## 2020-12-21 RX ORDER — TIZANIDINE 2 MG/1
2 TABLET ORAL NIGHTLY
Qty: 30 TABLET | Refills: 0 | Status: SHIPPED | OUTPATIENT
Start: 2020-12-21 | End: 2021-01-19

## 2021-01-17 DIAGNOSIS — M62.838 MUSCLE SPASM: ICD-10-CM

## 2021-01-19 RX ORDER — TIZANIDINE 2 MG/1
2 TABLET ORAL NIGHTLY
Qty: 30 TABLET | Refills: 0 | Status: SHIPPED | OUTPATIENT
Start: 2021-01-19 | End: 2021-04-27

## 2021-02-07 DIAGNOSIS — F33.1 MDD (MAJOR DEPRESSIVE DISORDER), RECURRENT EPISODE, MODERATE: ICD-10-CM

## 2021-02-09 RX ORDER — DESVENLAFAXINE 100 MG/1
100 TABLET, EXTENDED RELEASE ORAL DAILY
Qty: 30 TABLET | Refills: 11 | Status: SHIPPED | OUTPATIENT
Start: 2021-02-09 | End: 2021-09-22

## 2021-03-11 ENCOUNTER — PATIENT MESSAGE (OUTPATIENT)
Dept: FAMILY MEDICINE | Facility: CLINIC | Age: 41
End: 2021-03-11

## 2021-03-11 DIAGNOSIS — G62.9 NEUROPATHY: ICD-10-CM

## 2021-03-11 RX ORDER — GABAPENTIN 300 MG/1
CAPSULE ORAL
Qty: 90 CAPSULE | Refills: 0 | Status: SHIPPED | OUTPATIENT
Start: 2021-03-11 | End: 2021-04-27 | Stop reason: SDUPTHER

## 2021-03-11 RX ORDER — GABAPENTIN 300 MG/1
900 CAPSULE ORAL NIGHTLY
Qty: 90 CAPSULE | Refills: 1 | Status: CANCELLED | OUTPATIENT
Start: 2021-03-11

## 2021-03-31 ENCOUNTER — EXTERNAL CHRONIC CARE MANAGEMENT (OUTPATIENT)
Dept: PRIMARY CARE CLINIC | Facility: CLINIC | Age: 41
End: 2021-03-31
Payer: COMMERCIAL

## 2021-03-31 PROCEDURE — 99490 CHRNC CARE MGMT STAFF 1ST 20: CPT | Mod: PBBFAC,PO | Performed by: FAMILY MEDICINE

## 2021-03-31 PROCEDURE — 99490 CHRNC CARE MGMT STAFF 1ST 20: CPT | Mod: S$GLB,ICN,, | Performed by: FAMILY MEDICINE

## 2021-03-31 PROCEDURE — 99439 PR CHRONIC CARE MGMT, EA ADDTL 20 MIN: ICD-10-PCS | Mod: S$GLB,ICN,, | Performed by: FAMILY MEDICINE

## 2021-03-31 PROCEDURE — 99490 PR CHRONIC CARE MGMT, 1ST 20 MIN: ICD-10-PCS | Mod: S$GLB,ICN,, | Performed by: FAMILY MEDICINE

## 2021-03-31 PROCEDURE — 99439 CHRNC CARE MGMT STAF EA ADDL: CPT | Mod: PBBFAC,PO,25,27 | Performed by: FAMILY MEDICINE

## 2021-03-31 PROCEDURE — 99439 CHRNC CARE MGMT STAF EA ADDL: CPT | Mod: S$GLB,ICN,, | Performed by: FAMILY MEDICINE

## 2021-04-16 ENCOUNTER — OFFICE VISIT (OUTPATIENT)
Dept: URGENT CARE | Facility: CLINIC | Age: 41
End: 2021-04-16
Payer: COMMERCIAL

## 2021-04-16 VITALS
BODY MASS INDEX: 39.2 KG/M2 | HEIGHT: 62 IN | SYSTOLIC BLOOD PRESSURE: 133 MMHG | TEMPERATURE: 98 F | WEIGHT: 213 LBS | DIASTOLIC BLOOD PRESSURE: 71 MMHG | RESPIRATION RATE: 16 BRPM | HEART RATE: 73 BPM | OXYGEN SATURATION: 96 %

## 2021-04-16 DIAGNOSIS — H60.502 ACUTE OTITIS EXTERNA OF LEFT EAR, UNSPECIFIED TYPE: Primary | ICD-10-CM

## 2021-04-16 DIAGNOSIS — H66.92 LEFT OTITIS MEDIA, UNSPECIFIED OTITIS MEDIA TYPE: ICD-10-CM

## 2021-04-16 PROCEDURE — 99214 OFFICE O/P EST MOD 30 MIN: CPT | Mod: S$GLB,,, | Performed by: PHYSICIAN ASSISTANT

## 2021-04-16 PROCEDURE — 99214 PR OFFICE/OUTPT VISIT, EST, LEVL IV, 30-39 MIN: ICD-10-PCS | Mod: S$GLB,,, | Performed by: PHYSICIAN ASSISTANT

## 2021-04-16 RX ORDER — OFLOXACIN 3 MG/ML
5 SOLUTION AURICULAR (OTIC) 2 TIMES DAILY
Qty: 5 ML | Refills: 1 | Status: SHIPPED | OUTPATIENT
Start: 2021-04-16 | End: 2023-09-19

## 2021-04-16 RX ORDER — AZITHROMYCIN 250 MG/1
TABLET, FILM COATED ORAL
Qty: 6 TABLET | Refills: 0 | Status: SHIPPED | OUTPATIENT
Start: 2021-04-16 | End: 2021-04-21

## 2021-04-27 ENCOUNTER — OFFICE VISIT (OUTPATIENT)
Dept: FAMILY MEDICINE | Facility: CLINIC | Age: 41
End: 2021-04-27
Payer: COMMERCIAL

## 2021-04-27 VITALS
OXYGEN SATURATION: 98 % | DIASTOLIC BLOOD PRESSURE: 80 MMHG | TEMPERATURE: 99 F | HEART RATE: 87 BPM | HEIGHT: 62 IN | WEIGHT: 218.25 LBS | SYSTOLIC BLOOD PRESSURE: 124 MMHG | BODY MASS INDEX: 40.16 KG/M2

## 2021-04-27 DIAGNOSIS — F41.9 ANXIETY: ICD-10-CM

## 2021-04-27 DIAGNOSIS — Z00.00 ANNUAL PHYSICAL EXAM: Primary | ICD-10-CM

## 2021-04-27 DIAGNOSIS — G47.00 INSOMNIA, UNSPECIFIED TYPE: ICD-10-CM

## 2021-04-27 DIAGNOSIS — G62.9 NEUROPATHY: ICD-10-CM

## 2021-04-27 DIAGNOSIS — G43.009 MIGRAINE WITHOUT AURA AND WITHOUT STATUS MIGRAINOSUS, NOT INTRACTABLE: ICD-10-CM

## 2021-04-27 PROCEDURE — 3008F PR BODY MASS INDEX (BMI) DOCUMENTED: ICD-10-PCS | Mod: CPTII,S$GLB,, | Performed by: FAMILY MEDICINE

## 2021-04-27 PROCEDURE — 99999 PR PBB SHADOW E&M-EST. PATIENT-LVL III: ICD-10-PCS | Mod: PBBFAC,,, | Performed by: FAMILY MEDICINE

## 2021-04-27 PROCEDURE — 99214 PR OFFICE/OUTPT VISIT, EST, LEVL IV, 30-39 MIN: ICD-10-PCS | Mod: S$GLB,,, | Performed by: FAMILY MEDICINE

## 2021-04-27 PROCEDURE — 99999 PR PBB SHADOW E&M-EST. PATIENT-LVL III: CPT | Mod: PBBFAC,,, | Performed by: FAMILY MEDICINE

## 2021-04-27 PROCEDURE — 3008F BODY MASS INDEX DOCD: CPT | Mod: CPTII,S$GLB,, | Performed by: FAMILY MEDICINE

## 2021-04-27 PROCEDURE — 99214 OFFICE O/P EST MOD 30 MIN: CPT | Mod: S$GLB,,, | Performed by: FAMILY MEDICINE

## 2021-04-27 RX ORDER — ALPRAZOLAM 0.5 MG/1
0.5 TABLET ORAL 2 TIMES DAILY
Qty: 60 TABLET | Refills: 5 | Status: SHIPPED | OUTPATIENT
Start: 2021-04-27 | End: 2021-09-22 | Stop reason: SDUPTHER

## 2021-04-27 RX ORDER — GABAPENTIN 300 MG/1
CAPSULE ORAL
Qty: 90 CAPSULE | Refills: 5 | Status: SHIPPED | OUTPATIENT
Start: 2021-04-27 | End: 2021-11-29

## 2021-04-27 RX ORDER — TOPIRAMATE 100 MG/1
100 TABLET, FILM COATED ORAL DAILY
Qty: 90 TABLET | Refills: 1 | Status: SHIPPED | OUTPATIENT
Start: 2021-04-27 | End: 2022-01-04

## 2021-04-27 RX ORDER — ZOLPIDEM TARTRATE 10 MG/1
10 TABLET ORAL NIGHTLY PRN
Qty: 30 TABLET | Refills: 5 | Status: SHIPPED | OUTPATIENT
Start: 2021-04-27 | End: 2021-11-12

## 2021-04-30 ENCOUNTER — EXTERNAL CHRONIC CARE MANAGEMENT (OUTPATIENT)
Dept: PRIMARY CARE CLINIC | Facility: CLINIC | Age: 41
End: 2021-04-30
Payer: COMMERCIAL

## 2021-04-30 PROCEDURE — 99490 PR CHRONIC CARE MGMT, 1ST 20 MIN: ICD-10-PCS | Mod: S$GLB,,, | Performed by: FAMILY MEDICINE

## 2021-04-30 PROCEDURE — 99490 CHRNC CARE MGMT STAFF 1ST 20: CPT | Mod: S$GLB,,, | Performed by: FAMILY MEDICINE

## 2021-04-30 PROCEDURE — 99490 CHRNC CARE MGMT STAFF 1ST 20: CPT | Mod: PBBFAC,PO | Performed by: FAMILY MEDICINE

## 2021-05-31 ENCOUNTER — EXTERNAL CHRONIC CARE MANAGEMENT (OUTPATIENT)
Dept: PRIMARY CARE CLINIC | Facility: CLINIC | Age: 41
End: 2021-05-31
Payer: COMMERCIAL

## 2021-05-31 PROCEDURE — 99490 CHRNC CARE MGMT STAFF 1ST 20: CPT | Mod: PBBFAC,PO | Performed by: FAMILY MEDICINE

## 2021-05-31 PROCEDURE — 99490 PR CHRONIC CARE MGMT, 1ST 20 MIN: ICD-10-PCS | Mod: S$GLB,,, | Performed by: FAMILY MEDICINE

## 2021-05-31 PROCEDURE — 99490 CHRNC CARE MGMT STAFF 1ST 20: CPT | Mod: S$GLB,,, | Performed by: FAMILY MEDICINE

## 2021-07-05 ENCOUNTER — OFFICE VISIT (OUTPATIENT)
Dept: URGENT CARE | Facility: CLINIC | Age: 41
End: 2021-07-05
Payer: COMMERCIAL

## 2021-07-05 VITALS
OXYGEN SATURATION: 97 % | SYSTOLIC BLOOD PRESSURE: 153 MMHG | HEART RATE: 82 BPM | RESPIRATION RATE: 18 BRPM | HEIGHT: 62 IN | BODY MASS INDEX: 40.12 KG/M2 | WEIGHT: 218 LBS | DIASTOLIC BLOOD PRESSURE: 91 MMHG | TEMPERATURE: 98 F

## 2021-07-05 DIAGNOSIS — R21 RASH AND NONSPECIFIC SKIN ERUPTION: Primary | ICD-10-CM

## 2021-07-05 DIAGNOSIS — B37.2 YEAST INFECTION OF THE SKIN: ICD-10-CM

## 2021-07-05 PROCEDURE — 99214 OFFICE O/P EST MOD 30 MIN: CPT | Mod: S$GLB,,, | Performed by: PHYSICIAN ASSISTANT

## 2021-07-05 PROCEDURE — 99214 PR OFFICE/OUTPT VISIT, EST, LEVL IV, 30-39 MIN: ICD-10-PCS | Mod: S$GLB,,, | Performed by: PHYSICIAN ASSISTANT

## 2021-07-05 PROCEDURE — 1125F PR PAIN SEVERITY QUANTIFIED, PAIN PRESENT: ICD-10-PCS | Mod: S$GLB,,, | Performed by: PHYSICIAN ASSISTANT

## 2021-07-05 PROCEDURE — 3008F PR BODY MASS INDEX (BMI) DOCUMENTED: ICD-10-PCS | Mod: CPTII,S$GLB,, | Performed by: PHYSICIAN ASSISTANT

## 2021-07-05 PROCEDURE — 3008F BODY MASS INDEX DOCD: CPT | Mod: CPTII,S$GLB,, | Performed by: PHYSICIAN ASSISTANT

## 2021-07-05 PROCEDURE — 1125F AMNT PAIN NOTED PAIN PRSNT: CPT | Mod: S$GLB,,, | Performed by: PHYSICIAN ASSISTANT

## 2021-07-05 RX ORDER — PRENATAL VIT 91/IRON/FOLIC/DHA 28-975-200
COMBINATION PACKAGE (EA) ORAL 2 TIMES DAILY
Qty: 15 G | Refills: 2 | Status: SHIPPED | OUTPATIENT
Start: 2021-07-05 | End: 2022-03-29

## 2021-07-05 RX ORDER — NYSTATIN 100000 [USP'U]/G
POWDER TOPICAL 3 TIMES DAILY
Qty: 60 G | Refills: 1 | Status: SHIPPED | OUTPATIENT
Start: 2021-07-05 | End: 2023-03-15

## 2021-07-31 ENCOUNTER — EXTERNAL CHRONIC CARE MANAGEMENT (OUTPATIENT)
Dept: PRIMARY CARE CLINIC | Facility: CLINIC | Age: 41
End: 2021-07-31
Payer: COMMERCIAL

## 2021-07-31 PROCEDURE — 99490 PR CHRONIC CARE MGMT, 1ST 20 MIN: ICD-10-PCS | Mod: S$GLB,,, | Performed by: FAMILY MEDICINE

## 2021-07-31 PROCEDURE — 99490 CHRNC CARE MGMT STAFF 1ST 20: CPT | Mod: S$GLB,,, | Performed by: FAMILY MEDICINE

## 2021-08-25 DIAGNOSIS — M62.838 MUSCLE SPASM: ICD-10-CM

## 2021-08-25 RX ORDER — TIZANIDINE 2 MG/1
2 TABLET ORAL NIGHTLY
Qty: 30 TABLET | Refills: 0 | OUTPATIENT
Start: 2021-08-25

## 2021-08-31 ENCOUNTER — EXTERNAL CHRONIC CARE MANAGEMENT (OUTPATIENT)
Dept: PRIMARY CARE CLINIC | Facility: CLINIC | Age: 41
End: 2021-08-31
Payer: COMMERCIAL

## 2021-08-31 PROCEDURE — 99439 PR CHRONIC CARE MGMT, EA ADDTL 20 MIN: ICD-10-PCS | Mod: S$GLB,,, | Performed by: FAMILY MEDICINE

## 2021-08-31 PROCEDURE — 99490 PR CHRONIC CARE MGMT, 1ST 20 MIN: ICD-10-PCS | Mod: S$GLB,,, | Performed by: FAMILY MEDICINE

## 2021-08-31 PROCEDURE — 99439 CHRNC CARE MGMT STAF EA ADDL: CPT | Mod: S$GLB,,, | Performed by: FAMILY MEDICINE

## 2021-08-31 PROCEDURE — 99490 CHRNC CARE MGMT STAFF 1ST 20: CPT | Mod: S$GLB,,, | Performed by: FAMILY MEDICINE

## 2021-09-22 ENCOUNTER — OFFICE VISIT (OUTPATIENT)
Dept: FAMILY MEDICINE | Facility: CLINIC | Age: 41
End: 2021-09-22
Payer: COMMERCIAL

## 2021-09-22 VITALS
WEIGHT: 220.44 LBS | HEART RATE: 76 BPM | OXYGEN SATURATION: 97 % | TEMPERATURE: 98 F | DIASTOLIC BLOOD PRESSURE: 86 MMHG | SYSTOLIC BLOOD PRESSURE: 130 MMHG | HEIGHT: 62 IN | BODY MASS INDEX: 40.57 KG/M2

## 2021-09-22 DIAGNOSIS — F39 MOOD DISORDER: ICD-10-CM

## 2021-09-22 DIAGNOSIS — R00.2 PALPITATIONS: ICD-10-CM

## 2021-09-22 DIAGNOSIS — Z00.00 ANNUAL PHYSICAL EXAM: Primary | ICD-10-CM

## 2021-09-22 DIAGNOSIS — N63.20 LEFT BREAST MASS: ICD-10-CM

## 2021-09-22 DIAGNOSIS — F41.9 ANXIETY: ICD-10-CM

## 2021-09-22 DIAGNOSIS — Z12.39 ENCOUNTER FOR SCREENING FOR MALIGNANT NEOPLASM OF BREAST, UNSPECIFIED SCREENING MODALITY: ICD-10-CM

## 2021-09-22 PROCEDURE — 3008F PR BODY MASS INDEX (BMI) DOCUMENTED: ICD-10-PCS | Mod: CPTII,S$GLB,, | Performed by: FAMILY MEDICINE

## 2021-09-22 PROCEDURE — 3008F BODY MASS INDEX DOCD: CPT | Mod: CPTII,S$GLB,, | Performed by: FAMILY MEDICINE

## 2021-09-22 PROCEDURE — 99214 OFFICE O/P EST MOD 30 MIN: CPT | Mod: S$GLB,,, | Performed by: FAMILY MEDICINE

## 2021-09-22 PROCEDURE — 3075F PR MOST RECENT SYSTOLIC BLOOD PRESS GE 130-139MM HG: ICD-10-PCS | Mod: CPTII,S$GLB,, | Performed by: FAMILY MEDICINE

## 2021-09-22 PROCEDURE — 1159F PR MEDICATION LIST DOCUMENTED IN MEDICAL RECORD: ICD-10-PCS | Mod: CPTII,S$GLB,, | Performed by: FAMILY MEDICINE

## 2021-09-22 PROCEDURE — 1160F PR REVIEW ALL MEDS BY PRESCRIBER/CLIN PHARMACIST DOCUMENTED: ICD-10-PCS | Mod: CPTII,S$GLB,, | Performed by: FAMILY MEDICINE

## 2021-09-22 PROCEDURE — 99999 PR PBB SHADOW E&M-EST. PATIENT-LVL IV: CPT | Mod: PBBFAC,,, | Performed by: FAMILY MEDICINE

## 2021-09-22 PROCEDURE — 3079F PR MOST RECENT DIASTOLIC BLOOD PRESSURE 80-89 MM HG: ICD-10-PCS | Mod: CPTII,S$GLB,, | Performed by: FAMILY MEDICINE

## 2021-09-22 PROCEDURE — 99214 PR OFFICE/OUTPT VISIT, EST, LEVL IV, 30-39 MIN: ICD-10-PCS | Mod: S$GLB,,, | Performed by: FAMILY MEDICINE

## 2021-09-22 PROCEDURE — 1160F RVW MEDS BY RX/DR IN RCRD: CPT | Mod: CPTII,S$GLB,, | Performed by: FAMILY MEDICINE

## 2021-09-22 PROCEDURE — 1159F MED LIST DOCD IN RCRD: CPT | Mod: CPTII,S$GLB,, | Performed by: FAMILY MEDICINE

## 2021-09-22 PROCEDURE — 3079F DIAST BP 80-89 MM HG: CPT | Mod: CPTII,S$GLB,, | Performed by: FAMILY MEDICINE

## 2021-09-22 PROCEDURE — 99999 PR PBB SHADOW E&M-EST. PATIENT-LVL IV: ICD-10-PCS | Mod: PBBFAC,,, | Performed by: FAMILY MEDICINE

## 2021-09-22 PROCEDURE — 3075F SYST BP GE 130 - 139MM HG: CPT | Mod: CPTII,S$GLB,, | Performed by: FAMILY MEDICINE

## 2021-09-22 RX ORDER — ALPRAZOLAM 0.5 MG/1
0.5 TABLET ORAL 2 TIMES DAILY
Qty: 60 TABLET | Refills: 5 | Status: SHIPPED | OUTPATIENT
Start: 2021-09-22 | End: 2022-03-29 | Stop reason: SDUPTHER

## 2021-09-22 RX ORDER — DESVENLAFAXINE SUCCINATE 50 MG/1
50 TABLET, EXTENDED RELEASE ORAL DAILY
Qty: 14 TABLET | Refills: 0 | Status: SHIPPED | OUTPATIENT
Start: 2021-09-22 | End: 2022-03-29

## 2021-09-22 RX ORDER — PAROXETINE HYDROCHLORIDE 20 MG/1
20 TABLET, FILM COATED ORAL EVERY MORNING
Qty: 30 TABLET | Refills: 11 | Status: SHIPPED | OUTPATIENT
Start: 2021-10-06 | End: 2023-03-15

## 2021-09-24 ENCOUNTER — TELEPHONE (OUTPATIENT)
Dept: FAMILY MEDICINE | Facility: CLINIC | Age: 41
End: 2021-09-24

## 2021-09-24 DIAGNOSIS — N64.4 BREAST PAIN, LEFT: Primary | ICD-10-CM

## 2021-09-28 ENCOUNTER — TELEPHONE (OUTPATIENT)
Dept: FAMILY MEDICINE | Facility: CLINIC | Age: 41
End: 2021-09-28

## 2021-10-21 DIAGNOSIS — M62.838 MUSCLE SPASM: ICD-10-CM

## 2021-10-21 RX ORDER — TIZANIDINE 2 MG/1
2 TABLET ORAL NIGHTLY
Qty: 30 TABLET | Refills: 0 | Status: SHIPPED | OUTPATIENT
Start: 2021-10-21 | End: 2021-11-12

## 2021-11-10 DIAGNOSIS — M62.838 MUSCLE SPASM: ICD-10-CM

## 2021-11-10 DIAGNOSIS — G47.00 INSOMNIA, UNSPECIFIED TYPE: ICD-10-CM

## 2021-11-12 RX ORDER — ZOLPIDEM TARTRATE 10 MG/1
10 TABLET ORAL NIGHTLY PRN
Qty: 30 TABLET | Refills: 5 | OUTPATIENT
Start: 2021-11-12 | End: 2022-05-13

## 2021-11-12 RX ORDER — TIZANIDINE 2 MG/1
2 TABLET ORAL NIGHTLY
Qty: 30 TABLET | Refills: 0 | OUTPATIENT
Start: 2021-11-12

## 2021-11-26 DIAGNOSIS — G62.9 NEUROPATHY: ICD-10-CM

## 2021-11-30 RX ORDER — GABAPENTIN 300 MG/1
CAPSULE ORAL
Qty: 90 CAPSULE | Refills: 5 | OUTPATIENT
Start: 2021-11-30

## 2021-12-07 ENCOUNTER — CLINICAL SUPPORT (OUTPATIENT)
Dept: URGENT CARE | Facility: CLINIC | Age: 41
End: 2021-12-07
Payer: COMMERCIAL

## 2021-12-07 DIAGNOSIS — Z20.822 ENCOUNTER FOR LABORATORY TESTING FOR COVID-19 VIRUS: Primary | ICD-10-CM

## 2021-12-07 LAB
CTP QC/QA: YES
SARS-COV-2 RDRP RESP QL NAA+PROBE: NEGATIVE

## 2021-12-07 PROCEDURE — U0002 COVID-19 LAB TEST NON-CDC: HCPCS | Mod: QW,S$GLB,, | Performed by: PHYSICIAN ASSISTANT

## 2021-12-07 PROCEDURE — U0002: ICD-10-PCS | Mod: QW,S$GLB,, | Performed by: PHYSICIAN ASSISTANT

## 2021-12-16 DIAGNOSIS — M62.838 MUSCLE SPASM: ICD-10-CM

## 2021-12-17 DIAGNOSIS — M62.838 MUSCLE SPASM: ICD-10-CM

## 2021-12-17 RX ORDER — TIZANIDINE 2 MG/1
TABLET ORAL
Qty: 30 TABLET | Refills: 0 | Status: SHIPPED | OUTPATIENT
Start: 2021-12-17 | End: 2022-01-24 | Stop reason: SDUPTHER

## 2021-12-17 RX ORDER — TIZANIDINE 2 MG/1
2 TABLET ORAL NIGHTLY
Qty: 30 TABLET | Refills: 0 | Status: CANCELLED | OUTPATIENT
Start: 2021-12-17

## 2022-01-02 DIAGNOSIS — G43.009 MIGRAINE WITHOUT AURA AND WITHOUT STATUS MIGRAINOSUS, NOT INTRACTABLE: ICD-10-CM

## 2022-01-03 NOTE — TELEPHONE ENCOUNTER
Last Office Visit Info:   The patient's last visit with Staci Sorenson MD was on 9/22/2021.    The patient's last visit in current department was on 9/22/2021.        Last CBC Results:   Lab Results   Component Value Date    WBC 6.50 12/04/2019    HGB 12.0 12/04/2019    HCT 38.3 12/04/2019     12/04/2019       Last CMP Results  Lab Results   Component Value Date     12/04/2019    K 4.5 12/04/2019     12/04/2019    CO2 25 12/04/2019    BUN 13 12/04/2019    CREATININE 0.78 12/04/2019    CALCIUM 9.1 12/04/2019    ALBUMIN 4.1 12/04/2019    AST 29 12/04/2019    ALT 17 12/04/2019       Last Lipids  Lab Results   Component Value Date    CHOL 235 (H) 12/04/2019    TRIG 109 12/04/2019    HDL 51 12/04/2019    LDLCALC 162.2 (H) 12/04/2019       Last A1C  No results found for: HGBA1C    Last TSH  Lab Results   Component Value Date    TSH 1.290 12/04/2019             Current Med Refills  Medication List with Changes/Refills   Current Medications    ALPRAZOLAM (XANAX) 0.5 MG TABLET    Take 1 tablet (0.5 mg total) by mouth 2 (two) times daily.       Start Date: 9/22/2021 End Date: --    DESVENLAFAXINE SUCCINATE (PRISTIQ) 50 MG TB24    Take 1 tablet (50 mg total) by mouth once daily. for 14 days       Start Date: 9/22/2021 End Date: 10/6/2021    FERROUS SULFATE (IRON) 325 MG (65 MG IRON) CPSR    Take by mouth.       Start Date: --        End Date: --    GABAPENTIN (NEURONTIN) 300 MG CAPSULE    TAKE 3 CAPSULES BY MOUTH EVERY DAY AT BEDTIME       Start Date: 11/29/2021End Date: --    GABAPENTIN (NEURONTIN) 300 MG CAPSULE    TAKE 3 CAPSULES BY MOUTH ONCE DAILY IN THE EVENING       Start Date: 11/29/2021End Date: --    NYSTATIN (MYCOSTATIN) POWDER    Apply topically 3 (three) times daily.       Start Date: 7/5/2021  End Date: --    OFLOXACIN (FLOXIN) 0.3 % OTIC SOLUTION    Place 5 drops into the left ear 2 (two) times daily.       Start Date: 4/16/2021 End Date: --    PANTOPRAZOLE (PROTONIX) 40 MG TABLET    Take 1  tablet (40 mg total) by mouth once daily.       Start Date: 9/22/2021 End Date: --    PAROXETINE (PAXIL) 20 MG TABLET    Take 1 tablet (20 mg total) by mouth every morning.       Start Date: 10/6/2021 End Date: 10/6/2022    TERBINAFINE HCL (LAMISIL) 1 % CREAM    Apply topically 2 (two) times daily.       Start Date: 7/5/2021  End Date: --    TIZANIDINE (ZANAFLEX) 2 MG TABLET    Take 1 tablet by mouth in the evening       Start Date: 12/17/2021End Date: --    TOPIRAMATE (TOPAMAX) 100 MG TABLET    Take 1 tablet (100 mg total) by mouth once daily.       Start Date: 4/27/2021 End Date: --    ZOLPIDEM (AMBIEN) 10 MG TAB    TAKE 1 TABLET BY MOUTH NIGHTLY AS NEEDED FOR INSOMNIA       Start Date: 11/12/2021End Date: --

## 2022-01-04 RX ORDER — TOPIRAMATE 100 MG/1
TABLET, FILM COATED ORAL
Qty: 90 TABLET | Refills: 0 | Status: SHIPPED | OUTPATIENT
Start: 2022-01-04 | End: 2022-01-24 | Stop reason: SDUPTHER

## 2022-01-20 DIAGNOSIS — F39 MOOD DISORDER: ICD-10-CM

## 2022-01-20 DIAGNOSIS — K21.9 GASTROESOPHAGEAL REFLUX DISEASE: ICD-10-CM

## 2022-01-20 NOTE — TELEPHONE ENCOUNTER
Last Office Visit Info:   The patient's last visit with Staci Sorenson MD was on 9/22/2021.    The patient's last visit in current department was on 9/22/2021.        Last CBC Results:   Lab Results   Component Value Date    WBC 6.50 12/04/2019    HGB 12.0 12/04/2019    HCT 38.3 12/04/2019     12/04/2019       Last CMP Results  Lab Results   Component Value Date     12/04/2019    K 4.5 12/04/2019     12/04/2019    CO2 25 12/04/2019    BUN 13 12/04/2019    CREATININE 0.78 12/04/2019    CALCIUM 9.1 12/04/2019    ALBUMIN 4.1 12/04/2019    AST 29 12/04/2019    ALT 17 12/04/2019       Last Lipids  Lab Results   Component Value Date    CHOL 235 (H) 12/04/2019    TRIG 109 12/04/2019    HDL 51 12/04/2019    LDLCALC 162.2 (H) 12/04/2019       Last A1C  No results found for: HGBA1C    Last TSH  Lab Results   Component Value Date    TSH 1.290 12/04/2019             Current Med Refills  Medication List with Changes/Refills   Current Medications    ALPRAZOLAM (XANAX) 0.5 MG TABLET    Take 1 tablet (0.5 mg total) by mouth 2 (two) times daily.       Start Date: 9/22/2021 End Date: --    DESVENLAFAXINE SUCCINATE (PRISTIQ) 50 MG TB24    Take 1 tablet (50 mg total) by mouth once daily. for 14 days       Start Date: 9/22/2021 End Date: 10/6/2021    FERROUS SULFATE (IRON) 325 MG (65 MG IRON) CPSR    Take by mouth.       Start Date: --        End Date: --    GABAPENTIN (NEURONTIN) 300 MG CAPSULE    TAKE 3 CAPSULES BY MOUTH EVERY DAY AT BEDTIME       Start Date: 11/29/2021End Date: --    GABAPENTIN (NEURONTIN) 300 MG CAPSULE    TAKE 3 CAPSULES BY MOUTH ONCE DAILY IN THE EVENING       Start Date: 11/29/2021End Date: --    NYSTATIN (MYCOSTATIN) POWDER    Apply topically 3 (three) times daily.       Start Date: 7/5/2021  End Date: --    OFLOXACIN (FLOXIN) 0.3 % OTIC SOLUTION    Place 5 drops into the left ear 2 (two) times daily.       Start Date: 4/16/2021 End Date: --    PANTOPRAZOLE (PROTONIX) 40 MG TABLET    Take 1  tablet (40 mg total) by mouth once daily.       Start Date: 9/22/2021 End Date: --    PAROXETINE (PAXIL) 20 MG TABLET    Take 1 tablet (20 mg total) by mouth every morning.       Start Date: 10/6/2021 End Date: 10/6/2022    TERBINAFINE HCL (LAMISIL) 1 % CREAM    Apply topically 2 (two) times daily.       Start Date: 7/5/2021  End Date: --    TIZANIDINE (ZANAFLEX) 2 MG TABLET    Take 1 tablet by mouth in the evening       Start Date: 12/17/2021End Date: --    TOPIRAMATE (TOPAMAX) 100 MG TABLET    Take 1 tablet by mouth once daily       Start Date: 1/4/2022  End Date: --    ZOLPIDEM (AMBIEN) 10 MG TAB    TAKE 1 TABLET BY MOUTH NIGHTLY AS NEEDED FOR INSOMNIA       Start Date: 11/12/2021End Date: --       Order(s) placed per written order guidelines:     Please advise.

## 2022-01-20 NOTE — TELEPHONE ENCOUNTER
No new care gaps identified.  Powered by [employer]. Reference number: [id number].   1/20/2022 2:56:42 PM CST

## 2022-01-21 RX ORDER — PAROXETINE HYDROCHLORIDE 20 MG/1
20 TABLET, FILM COATED ORAL EVERY MORNING
Qty: 30 TABLET | Refills: 11 | OUTPATIENT
Start: 2022-01-21 | End: 2023-01-21

## 2022-01-21 RX ORDER — PANTOPRAZOLE SODIUM 40 MG/1
40 TABLET, DELAYED RELEASE ORAL DAILY
Qty: 90 TABLET | Refills: 0 | Status: SHIPPED | OUTPATIENT
Start: 2022-01-21 | End: 2022-01-24 | Stop reason: SDUPTHER

## 2022-01-24 ENCOUNTER — PATIENT MESSAGE (OUTPATIENT)
Dept: FAMILY MEDICINE | Facility: CLINIC | Age: 42
End: 2022-01-24
Payer: COMMERCIAL

## 2022-01-24 DIAGNOSIS — M62.838 MUSCLE SPASM: ICD-10-CM

## 2022-01-24 DIAGNOSIS — K21.9 GASTROESOPHAGEAL REFLUX DISEASE: ICD-10-CM

## 2022-01-24 DIAGNOSIS — G43.009 MIGRAINE WITHOUT AURA AND WITHOUT STATUS MIGRAINOSUS, NOT INTRACTABLE: ICD-10-CM

## 2022-01-24 DIAGNOSIS — F39 MOOD DISORDER: ICD-10-CM

## 2022-01-24 NOTE — TELEPHONE ENCOUNTER
No new care gaps identified.  Powered by [employer]. Reference number: [id number].   1/24/2022 2:34:05 PM CST

## 2022-01-25 DIAGNOSIS — M62.838 MUSCLE SPASM: ICD-10-CM

## 2022-01-25 NOTE — TELEPHONE ENCOUNTER
No new care gaps identified.  Powered by [employer]. Reference number: [id number].   1/25/2022 8:28:36 AM CST

## 2022-01-25 NOTE — TELEPHONE ENCOUNTER
Last Office Visit Info:   The patient's last visit with Staci Sorenson MD was on 9/22/2021.    The patient's last visit in current department was on 9/22/2021.        Last CBC Results:   Lab Results   Component Value Date    WBC 6.50 12/04/2019    HGB 12.0 12/04/2019    HCT 38.3 12/04/2019     12/04/2019       Last CMP Results  Lab Results   Component Value Date     12/04/2019    K 4.5 12/04/2019     12/04/2019    CO2 25 12/04/2019    BUN 13 12/04/2019    CREATININE 0.78 12/04/2019    CALCIUM 9.1 12/04/2019    ALBUMIN 4.1 12/04/2019    AST 29 12/04/2019    ALT 17 12/04/2019       Last Lipids  Lab Results   Component Value Date    CHOL 235 (H) 12/04/2019    TRIG 109 12/04/2019    HDL 51 12/04/2019    LDLCALC 162.2 (H) 12/04/2019       Last A1C  No results found for: HGBA1C    Last TSH  Lab Results   Component Value Date    TSH 1.290 12/04/2019             Current Med Refills  Medication List with Changes/Refills   Current Medications    ALPRAZOLAM (XANAX) 0.5 MG TABLET    Take 1 tablet (0.5 mg total) by mouth 2 (two) times daily.       Start Date: 9/22/2021 End Date: --    DESVENLAFAXINE SUCCINATE (PRISTIQ) 50 MG TB24    Take 1 tablet (50 mg total) by mouth once daily. for 14 days       Start Date: 9/22/2021 End Date: 10/6/2021    FERROUS SULFATE (IRON) 325 MG (65 MG IRON) CPSR    Take by mouth.       Start Date: --        End Date: --    GABAPENTIN (NEURONTIN) 300 MG CAPSULE    TAKE 3 CAPSULES BY MOUTH EVERY DAY AT BEDTIME       Start Date: 11/29/2021End Date: --    GABAPENTIN (NEURONTIN) 300 MG CAPSULE    TAKE 3 CAPSULES BY MOUTH ONCE DAILY IN THE EVENING       Start Date: 11/29/2021End Date: --    NYSTATIN (MYCOSTATIN) POWDER    Apply topically 3 (three) times daily.       Start Date: 7/5/2021  End Date: --    OFLOXACIN (FLOXIN) 0.3 % OTIC SOLUTION    Place 5 drops into the left ear 2 (two) times daily.       Start Date: 4/16/2021 End Date: --    PANTOPRAZOLE (PROTONIX) 40 MG TABLET    Take 1  tablet (40 mg total) by mouth once daily.       Start Date: 1/21/2022 End Date: --    PAROXETINE (PAXIL) 20 MG TABLET    Take 1 tablet (20 mg total) by mouth every morning.       Start Date: 10/6/2021 End Date: 10/6/2022    TERBINAFINE HCL (LAMISIL) 1 % CREAM    Apply topically 2 (two) times daily.       Start Date: 7/5/2021  End Date: --    TIZANIDINE (ZANAFLEX) 2 MG TABLET    Take 1 tablet by mouth in the evening       Start Date: 12/17/2021End Date: --    TOPIRAMATE (TOPAMAX) 100 MG TABLET    Take 1 tablet by mouth once daily       Start Date: 1/4/2022  End Date: --    ZOLPIDEM (AMBIEN) 10 MG TAB    TAKE 1 TABLET BY MOUTH NIGHTLY AS NEEDED FOR INSOMNIA       Start Date: 11/12/2021End Date: --       Order(s) placed per written order guidelines:     Please advise.

## 2022-01-26 RX ORDER — PAROXETINE HYDROCHLORIDE 20 MG/1
20 TABLET, FILM COATED ORAL EVERY MORNING
Qty: 30 TABLET | Refills: 11 | OUTPATIENT
Start: 2022-01-26 | End: 2023-01-26

## 2022-01-26 RX ORDER — PANTOPRAZOLE SODIUM 40 MG/1
40 TABLET, DELAYED RELEASE ORAL DAILY
Qty: 90 TABLET | Refills: 0 | Status: SHIPPED | OUTPATIENT
Start: 2022-01-26 | End: 2022-02-07

## 2022-01-26 RX ORDER — TIZANIDINE 2 MG/1
2 TABLET ORAL NIGHTLY
Qty: 30 TABLET | Refills: 0 | Status: SHIPPED | OUTPATIENT
Start: 2022-01-26 | End: 2022-02-21 | Stop reason: SDUPTHER

## 2022-01-26 RX ORDER — TIZANIDINE 2 MG/1
2 TABLET ORAL NIGHTLY
Qty: 30 TABLET | Refills: 0 | OUTPATIENT
Start: 2022-01-26

## 2022-01-26 RX ORDER — TOPIRAMATE 100 MG/1
100 TABLET, FILM COATED ORAL DAILY
Qty: 90 TABLET | Refills: 0 | Status: SHIPPED | OUTPATIENT
Start: 2022-01-26 | End: 2022-03-29 | Stop reason: SDUPTHER

## 2022-03-21 DIAGNOSIS — M62.838 MUSCLE SPASM: ICD-10-CM

## 2022-03-21 NOTE — TELEPHONE ENCOUNTER
No new care gaps identified.  Powered by [employer]. Reference number: [id number].   3/21/2022 7:50:01 AM CDT

## 2022-03-23 NOTE — TELEPHONE ENCOUNTER
Last Office Visit Info:   The patient's last visit with Staci Sorenson MD was on 9/22/2021.    The patient's last visit in current department was on 9/22/2021.        Last CBC Results:   Lab Results   Component Value Date    WBC 6.50 12/04/2019    HGB 12.0 12/04/2019    HCT 38.3 12/04/2019     12/04/2019       Last CMP Results  Lab Results   Component Value Date     12/04/2019    K 4.5 12/04/2019     12/04/2019    CO2 25 12/04/2019    BUN 13 12/04/2019    CREATININE 0.78 12/04/2019    CALCIUM 9.1 12/04/2019    ALBUMIN 4.1 12/04/2019    AST 29 12/04/2019    ALT 17 12/04/2019       Last Lipids  Lab Results   Component Value Date    CHOL 235 (H) 12/04/2019    TRIG 109 12/04/2019    HDL 51 12/04/2019    LDLCALC 162.2 (H) 12/04/2019       Last A1C  No results found for: HGBA1C    Last TSH  Lab Results   Component Value Date    TSH 1.290 12/04/2019             Current Med Refills  Medication List with Changes/Refills   Current Medications    ALPRAZOLAM (XANAX) 0.5 MG TABLET    Take 1 tablet (0.5 mg total) by mouth 2 (two) times daily.       Start Date: 9/22/2021 End Date: --    DESVENLAFAXINE SUCCINATE (PRISTIQ) 50 MG TB24    Take 1 tablet (50 mg total) by mouth once daily. for 14 days       Start Date: 9/22/2021 End Date: 10/6/2021    FERROUS SULFATE (IRON) 325 MG (65 MG IRON) CPSR    Take by mouth.       Start Date: --        End Date: --    GABAPENTIN (NEURONTIN) 300 MG CAPSULE    TAKE 3 CAPSULES BY MOUTH EVERY DAY AT BEDTIME       Start Date: 11/29/2021End Date: --    GABAPENTIN (NEURONTIN) 300 MG CAPSULE    TAKE 3 CAPSULES BY MOUTH ONCE DAILY IN THE EVENING       Start Date: 11/29/2021End Date: --    NYSTATIN (MYCOSTATIN) POWDER    Apply topically 3 (three) times daily.       Start Date: 7/5/2021  End Date: --    OFLOXACIN (FLOXIN) 0.3 % OTIC SOLUTION    Place 5 drops into the left ear 2 (two) times daily.       Start Date: 4/16/2021 End Date: --    PANTOPRAZOLE (PROTONIX) 40 MG TABLET    TAKE 1  TABLET BY MOUTH ONCE DAILY       Start Date: 2/7/2022  End Date: --    PAROXETINE (PAXIL) 20 MG TABLET    Take 1 tablet (20 mg total) by mouth every morning.       Start Date: 10/6/2021 End Date: 10/6/2022    TERBINAFINE HCL (LAMISIL) 1 % CREAM    Apply topically 2 (two) times daily.       Start Date: 7/5/2021  End Date: --    TIZANIDINE (ZANAFLEX) 2 MG TABLET    Take 1 tablet (2 mg total) by mouth every evening.       Start Date: 2/22/2022 End Date: --    TOPIRAMATE (TOPAMAX) 100 MG TABLET    Take 1 tablet (100 mg total) by mouth once daily.       Start Date: 1/26/2022 End Date: --    ZOLPIDEM (AMBIEN) 10 MG TAB    TAKE 1 TABLET BY MOUTH NIGHTLY AS NEEDED FOR INSOMNIA       Start Date: 11/12/2021End Date: --

## 2022-03-24 RX ORDER — TIZANIDINE 2 MG/1
2 TABLET ORAL NIGHTLY
OUTPATIENT
Start: 2022-03-24

## 2022-03-29 ENCOUNTER — OFFICE VISIT (OUTPATIENT)
Dept: FAMILY MEDICINE | Facility: CLINIC | Age: 42
End: 2022-03-29
Payer: COMMERCIAL

## 2022-03-29 DIAGNOSIS — G47.00 INSOMNIA, UNSPECIFIED TYPE: ICD-10-CM

## 2022-03-29 DIAGNOSIS — F41.9 ANXIETY: ICD-10-CM

## 2022-03-29 DIAGNOSIS — G43.009 MIGRAINE WITHOUT AURA AND WITHOUT STATUS MIGRAINOSUS, NOT INTRACTABLE: ICD-10-CM

## 2022-03-29 DIAGNOSIS — Z00.00 ANNUAL PHYSICAL EXAM: Primary | ICD-10-CM

## 2022-03-29 DIAGNOSIS — M62.838 MUSCLE SPASM: ICD-10-CM

## 2022-03-29 PROCEDURE — 1160F PR REVIEW ALL MEDS BY PRESCRIBER/CLIN PHARMACIST DOCUMENTED: ICD-10-PCS | Mod: CPTII,95,, | Performed by: FAMILY MEDICINE

## 2022-03-29 PROCEDURE — 1160F RVW MEDS BY RX/DR IN RCRD: CPT | Mod: CPTII,95,, | Performed by: FAMILY MEDICINE

## 2022-03-29 PROCEDURE — 1159F MED LIST DOCD IN RCRD: CPT | Mod: CPTII,95,, | Performed by: FAMILY MEDICINE

## 2022-03-29 PROCEDURE — 99214 OFFICE O/P EST MOD 30 MIN: CPT | Mod: 95,,, | Performed by: FAMILY MEDICINE

## 2022-03-29 PROCEDURE — 1159F PR MEDICATION LIST DOCUMENTED IN MEDICAL RECORD: ICD-10-PCS | Mod: CPTII,95,, | Performed by: FAMILY MEDICINE

## 2022-03-29 PROCEDURE — 99214 PR OFFICE/OUTPT VISIT, EST, LEVL IV, 30-39 MIN: ICD-10-PCS | Mod: 95,,, | Performed by: FAMILY MEDICINE

## 2022-03-29 RX ORDER — ALPRAZOLAM 0.5 MG/1
0.5 TABLET ORAL 2 TIMES DAILY
Qty: 60 TABLET | Refills: 5 | Status: SHIPPED | OUTPATIENT
Start: 2022-03-29 | End: 2022-10-05 | Stop reason: SDUPTHER

## 2022-03-29 RX ORDER — TOPIRAMATE 100 MG/1
100 TABLET, FILM COATED ORAL DAILY
Qty: 90 TABLET | Refills: 1 | Status: SHIPPED | OUTPATIENT
Start: 2022-03-29 | End: 2022-06-29 | Stop reason: SDUPTHER

## 2022-03-29 RX ORDER — ESOMEPRAZOLE MAGNESIUM 40 MG/1
40 CAPSULE, DELAYED RELEASE ORAL DAILY
COMMUNITY
End: 2023-03-15

## 2022-03-29 NOTE — PROGRESS NOTES
Answers for HPI/ROS submitted by the patient on 3/24/2022  activity change: No  unexpected weight change: No  neck pain: No  hearing loss: No  rhinorrhea: No  trouble swallowing: No  eye discharge: No  visual disturbance: No  chest tightness: No  wheezing: No  chest pain: No  palpitations: No  blood in stool: No  constipation: No  vomiting: No  diarrhea: No  polydipsia: No  polyuria: No  difficulty urinating: No  hematuria: No  menstrual problem: No  dysuria: No  joint swelling: No  arthralgias: Yes  headaches: Yes  weakness: No  confusion: No  dysphoric mood: No

## 2022-03-29 NOTE — PROGRESS NOTES
Subjective:       Patient ID: Sarah Mukherjee is a 42 y.o. female.    Chief Complaint: No chief complaint on file.    The patient location is: [place]  The chief complaint leading to consultation is: medication refills of her     Visit type: audiovisual    Face to Face time with patient:   17 minutes of total time spent on the encounter, which includes face to face time and non-face to face time preparing to see the patient (eg, review of tests), Obtaining and/or reviewing separately obtained history, Documenting clinical information in the electronic or other health record, Independently interpreting results (not separately reported) and communicating results to the patient/family/caregiver, or Care coordination (not separately reported).         Each patient to whom he or she provides medical services by telemedicine is:  (1) informed of the relationship between the physician and patient and the respective role of any other health care provider with respect to management of the patient; and (2) notified that he or she may decline to receive medical services by telemedicine and may withdraw from such care at any time.    Notes:   43 yearold female presents for follow up on anxiety. She was switched from pristiq to paxil and is doing very well on this medication.     She was having stomach issues and they stopped the protonix and they switched to nexium. She states hse is no longer having the heaviness in her chest or heart burn.        Past Medical History:  No date: Cancer      Comment:  skin cancer removed  No date: Crohn's disease  No date: Degenerative joint disease of spine  No date: Esophagitis  No date: History of stomach ulcers  6/20/2014: Lumbar facet arthropathy  No date: Neurogenic bladder   Past Surgical History:  No date: BREAST RECONSTRUCTION  No date: HYSTERECTOMY      Comment:  total hysterectomy in 20s due to fibroid, endometriosis  1/4/2019: INJECTION OF ANESTHETIC AGENT AROUND NERVE; Left       Comment:  Procedure: Block, Nerve MEDIAL BRANCH BLOCKS LEFT LUMBAR               L5-S3;  Surgeon: Andra Burt MD;  Location: [facility]                PAIN MGT;  Service: Pain Management;  Laterality: Left;                 1 OF 2  6/28/2019: INJECTION OF JOINT; Left      Comment:  Procedure: INJECTION, JOINT COCCYX;  Surgeon: Andra Burt MD;  Location: [facility] PAIN MGT;  Service: Pain                Management;  Laterality: Left;  INJECT JOINt & LEFT SIDE  No date: PELVIC LAPAROSCOPY  No date: TONSILLECTOMY  Review of patient's family history indicates:  Problem: No Known Problems      Relation: Father          Age of Onset: (Not Specified)  Problem: Diabetes      Relation: Mother          Age of Onset: (Not Specified)  Problem: Hypertension      Relation: Mother          Age of Onset: (Not Specified)  Problem: Breast cancer      Relation: Maternal Aunt          Age of Onset: 47  Problem: Ovarian cancer      Relation: Maternal Aunt          Age of Onset: (Not Specified)    Social History    Socioeconomic History      Marital status:     Tobacco Use      Smoking status: Never Smoker      Smokeless tobacco: Never Used    Substance and Sexual Activity      Alcohol use: Yes        Comment: occ      Drug use: No      Sexual activity: Yes    Social Determinants of Health  Financial Resource Strain: Low Risk       Difficulty of Paying Living Expenses: Not hard at all  Food Insecurity: No Food Insecurity      Worried About Running Out of Food in the Last Year: Never true      Ran Out of Food in the Last Year: Never true  Transportation Needs: No Transportation Needs      Lack of Transportation (Medical): No      Lack of Transportation (Non-Medical): No  Physical Activity: Insufficiently Active      Days of Exercise per Week: 2 days      Minutes of Exercise per Session: 30 min  Stress: No Stress Concern Present      Feeling of Stress : Only a little  Social Connections: Unknown      Frequency of  Communication with Friends and Family: More than three times a week      Frequency of Social Gatherings with Friends and Family: More than three times a week      Active Member of Clubs or Organizations: No      Attends Club or Organization Meetings: Never      Marital Status:   Housing Stability: Low Risk       Unable to Pay for Housing in the Last Year: No      Number of Places Lived in the Last Year: 1      Unstable Housing in the Last Year: No      Review of Systems   Constitutional: Negative for activity change and unexpected weight change.   HENT: Negative for hearing loss, rhinorrhea and trouble swallowing.    Eyes: Negative for discharge and visual disturbance.   Respiratory: Negative for chest tightness and wheezing.    Cardiovascular: Negative for chest pain and palpitations.   Gastrointestinal: Negative for blood in stool, constipation, diarrhea and vomiting.   Endocrine: Negative for polydipsia and polyuria.   Genitourinary: Negative for difficulty urinating, dysuria, hematuria and menstrual problem.   Musculoskeletal: Positive for arthralgias. Negative for joint swelling and neck pain.   Neurological: Positive for headaches. Negative for weakness.   Psychiatric/Behavioral: Negative for confusion and dysphoric mood.         Objective:      Physical Exam    Assessment:       Problem List Items Addressed This Visit    None     Visit Diagnoses     Annual physical exam    -  Primary    Relevant Orders    CBC Auto Differential    Comprehensive Metabolic Panel    Lipid Panel    TSH    Anxiety        Relevant Medications    ALPRAZolam (XANAX) 0.5 MG tablet    Insomnia, unspecified type        Migraine without aura and without status migrainosus, not intractable        Relevant Medications    topiramate (TOPAMAX) 100 MG tablet          Plan:       Diagnoses and all orders for this visit:    Annual physical exam  -     CBC Auto Differential; Future  -     Comprehensive Metabolic Panel; Future  -     Lipid  Panel; Future  -     TSH; Future  Due for labs    Anxiety  -     ALPRAZolam (XANAX) 0.5 MG tablet; Take 1 tablet (0.5 mg total) by mouth 2 (two) times daily.  Stable. Refilled meds.     Insomnia, unspecified type    Migraine without aura and without status migrainosus, not intractable  -     topiramate (TOPAMAX) 100 MG tablet; Take 1 tablet (100 mg total) by mouth once daily.  Stable. Refilled meds.

## 2022-03-29 NOTE — TELEPHONE ENCOUNTER
No new care gaps identified.  Powered by [employer]. Reference number: [id number].   3/29/2022 9:26:31 AM CDT

## 2022-03-30 RX ORDER — TIZANIDINE 2 MG/1
TABLET ORAL
Qty: 30 TABLET | Refills: 0 | Status: SHIPPED | OUTPATIENT
Start: 2022-03-30 | End: 2022-04-27

## 2022-04-24 DIAGNOSIS — M62.838 MUSCLE SPASM: ICD-10-CM

## 2022-04-24 NOTE — TELEPHONE ENCOUNTER
No new care gaps identified.  Powered by [employer]. Reference number: [id number].   4/24/2022 6:30:09 PM CDT

## 2022-04-24 NOTE — TELEPHONE ENCOUNTER
No new care gaps identified.  Powered by [employer]. Reference number: [id number].   4/24/2022 6:27:30 PM CDT

## 2022-04-27 RX ORDER — TIZANIDINE 2 MG/1
2 TABLET ORAL NIGHTLY
Qty: 30 TABLET | Refills: 0 | Status: SHIPPED | OUTPATIENT
Start: 2022-04-27 | End: 2022-10-05 | Stop reason: SDUPTHER

## 2022-04-27 RX ORDER — TIZANIDINE 2 MG/1
TABLET ORAL
Qty: 30 TABLET | Refills: 0 | Status: SHIPPED | OUTPATIENT
Start: 2022-04-27 | End: 2022-06-29 | Stop reason: SDUPTHER

## 2022-05-07 ENCOUNTER — PATIENT MESSAGE (OUTPATIENT)
Dept: FAMILY MEDICINE | Facility: CLINIC | Age: 42
End: 2022-05-07
Payer: COMMERCIAL

## 2022-05-07 DIAGNOSIS — G47.00 INSOMNIA, UNSPECIFIED TYPE: ICD-10-CM

## 2022-05-07 RX ORDER — ZOLPIDEM TARTRATE 10 MG/1
10 TABLET ORAL NIGHTLY
Qty: 30 TABLET | Refills: 5 | Status: CANCELLED | OUTPATIENT
Start: 2022-05-07

## 2022-05-07 NOTE — TELEPHONE ENCOUNTER
No new care gaps identified.  [facility] Embedded Care Gaps. Reference number: [id number]. 5/07/2022   3:12:25 PM CDT

## 2022-05-07 NOTE — TELEPHONE ENCOUNTER
No new care gaps identified.  [facility] Embedded Care Gaps. Reference number: [id number]. 5/07/2022   3:09:07 PM CDT

## 2022-05-09 NOTE — TELEPHONE ENCOUNTER
Last Office Visit Info:   The patient's last visit with Staci Sorenson MD was on 3/29/2022.    The patient's last visit in current department was on 3/29/2022.        Last CBC Results:   Lab Results   Component Value Date    WBC 6.50 12/04/2019    HGB 12.0 12/04/2019    HCT 38.3 12/04/2019     12/04/2019       Last CMP Results  Lab Results   Component Value Date     12/04/2019    K 4.5 12/04/2019     12/04/2019    CO2 25 12/04/2019    BUN 13 12/04/2019    CREATININE 0.78 12/04/2019    CALCIUM 9.1 12/04/2019    ALBUMIN 4.1 12/04/2019    AST 29 12/04/2019    ALT 17 12/04/2019       Last Lipids  Lab Results   Component Value Date    CHOL 235 (H) 12/04/2019    TRIG 109 12/04/2019    HDL 51 12/04/2019    LDLCALC 162.2 (H) 12/04/2019       Last A1C  No results found for: HGBA1C    Last TSH  Lab Results   Component Value Date    TSH 1.290 12/04/2019             Current Med Refills  Medication List with Changes/Refills   Current Medications    ALPRAZOLAM (XANAX) 0.5 MG TABLET    Take 1 tablet (0.5 mg total) by mouth 2 (two) times daily.       Start Date: 3/29/2022 End Date: --    ESOMEPRAZOLE (NEXIUM) 40 MG CAPSULE    Take 40 mg by mouth once daily.       Start Date: --        End Date: --    FERROUS SULFATE (IRON) 325 MG (65 MG IRON) CPSR    Take by mouth.       Start Date: --        End Date: --    GABAPENTIN (NEURONTIN) 300 MG CAPSULE    TAKE 3 CAPSULES BY MOUTH ONCE DAILY IN THE EVENING       Start Date: 11/29/2021End Date: --    NYSTATIN (MYCOSTATIN) POWDER    Apply topically 3 (three) times daily.       Start Date: 7/5/2021  End Date: --    OFLOXACIN (FLOXIN) 0.3 % OTIC SOLUTION    Place 5 drops into the left ear 2 (two) times daily.       Start Date: 4/16/2021 End Date: --    PAROXETINE (PAXIL) 20 MG TABLET    Take 1 tablet (20 mg total) by mouth every morning.       Start Date: 10/6/2021 End Date: 10/6/2022    TIZANIDINE (ZANAFLEX) 2 MG TABLET    Take 1 tablet by mouth in the evening        Start Date: 4/27/2022 End Date: --    TIZANIDINE (ZANAFLEX) 2 MG TABLET    Take 1 tablet (2 mg total) by mouth every evening.       Start Date: 4/27/2022 End Date: --    TOPIRAMATE (TOPAMAX) 100 MG TABLET    Take 1 tablet (100 mg total) by mouth once daily.       Start Date: 3/29/2022 End Date: --    ZOLPIDEM (AMBIEN) 10 MG TAB    TAKE 1 TABLET BY MOUTH NIGHTLY AS NEEDED FOR INSOMNIA       Start Date: 11/12/2021End Date: --       Order(s) placed per written order guidelines:     Please advise.

## 2022-05-09 NOTE — TELEPHONE ENCOUNTER
Last Office Visit Info:   The patient's last visit with Staci Sorenson MD was on 3/29/2022.    The patient's last visit in current department was on 3/29/2022.        Last CBC Results:   Lab Results   Component Value Date    WBC 6.50 12/04/2019    HGB 12.0 12/04/2019    HCT 38.3 12/04/2019     12/04/2019       Last CMP Results  Lab Results   Component Value Date     12/04/2019    K 4.5 12/04/2019     12/04/2019    CO2 25 12/04/2019    BUN 13 12/04/2019    CREATININE 0.78 12/04/2019    CALCIUM 9.1 12/04/2019    ALBUMIN 4.1 12/04/2019    AST 29 12/04/2019    ALT 17 12/04/2019       Last Lipids  Lab Results   Component Value Date    CHOL 235 (H) 12/04/2019    TRIG 109 12/04/2019    HDL 51 12/04/2019    LDLCALC 162.2 (H) 12/04/2019       Last A1C  No results found for: HGBA1C    Last TSH  Lab Results   Component Value Date    TSH 1.290 12/04/2019             Current Med Refills  Medication List with Changes/Refills   Current Medications    ALPRAZOLAM (XANAX) 0.5 MG TABLET    Take 1 tablet (0.5 mg total) by mouth 2 (two) times daily.       Start Date: 3/29/2022 End Date: --    ESOMEPRAZOLE (NEXIUM) 40 MG CAPSULE    Take 40 mg by mouth once daily.       Start Date: --        End Date: --    FERROUS SULFATE (IRON) 325 MG (65 MG IRON) CPSR    Take by mouth.       Start Date: --        End Date: --    GABAPENTIN (NEURONTIN) 300 MG CAPSULE    TAKE 3 CAPSULES BY MOUTH ONCE DAILY IN THE EVENING       Start Date: 11/29/2021End Date: --    NYSTATIN (MYCOSTATIN) POWDER    Apply topically 3 (three) times daily.       Start Date: 7/5/2021  End Date: --    OFLOXACIN (FLOXIN) 0.3 % OTIC SOLUTION    Place 5 drops into the left ear 2 (two) times daily.       Start Date: 4/16/2021 End Date: --    PAROXETINE (PAXIL) 20 MG TABLET    Take 1 tablet (20 mg total) by mouth every morning.       Start Date: 10/6/2021 End Date: 10/6/2022    TIZANIDINE (ZANAFLEX) 2 MG TABLET    Take 1 tablet by mouth in the evening        Start Date: 4/27/2022 End Date: --    TIZANIDINE (ZANAFLEX) 2 MG TABLET    Take 1 tablet (2 mg total) by mouth every evening.       Start Date: 4/27/2022 End Date: --    TOPIRAMATE (TOPAMAX) 100 MG TABLET    Take 1 tablet (100 mg total) by mouth once daily.       Start Date: 3/29/2022 End Date: --    ZOLPIDEM (AMBIEN) 10 MG TAB    TAKE 1 TABLET BY MOUTH NIGHTLY AS NEEDED FOR INSOMNIA       Start Date: 11/12/2021End Date: --

## 2022-05-09 NOTE — TELEPHONE ENCOUNTER
No new care gaps identified.  [facility] Embedded Care Gaps. Reference number: [id number]. 5/09/2022   9:45:17 AM RADHAT

## 2022-05-10 DIAGNOSIS — G47.00 INSOMNIA, UNSPECIFIED TYPE: ICD-10-CM

## 2022-05-10 RX ORDER — ZOLPIDEM TARTRATE 10 MG/1
TABLET ORAL
Qty: 30 TABLET | Refills: 0 | OUTPATIENT
Start: 2022-05-10

## 2022-05-10 RX ORDER — ZOLPIDEM TARTRATE 10 MG/1
TABLET ORAL
Qty: 30 TABLET | Refills: 5 | Status: SHIPPED | OUTPATIENT
Start: 2022-05-10 | End: 2022-05-17

## 2022-05-10 NOTE — TELEPHONE ENCOUNTER
----- Message from Eleni Gan sent at 5/10/2022  9:49 AM CDT -----  Type: RX Refill Request    Who Called: self     Have you contacted your pharmacy: no     Refill or New Rx: refill     RX Name and Strength: zolpidem (AMBIEN) 10 mg Tab    Preferred Pharmacy with phone number:   [id number] - [place], [place] - [address] [address]  [id number] [address]  [facility] [address]  Phone: [phone] Fax: [phone]    Local or Mail Order: local     Would the patient rather a call back or a response via My [facility]? Call back     Best Call Back Number: [phone]

## 2022-05-10 NOTE — TELEPHONE ENCOUNTER
No new care gaps identified.  [facility] Embedded Care Gaps. Reference number: [id number]. 5/10/2022   10:02:58 AM RADHAT

## 2022-05-12 DIAGNOSIS — G47.00 INSOMNIA, UNSPECIFIED TYPE: ICD-10-CM

## 2022-05-12 NOTE — TELEPHONE ENCOUNTER
No new care gaps identified.  [facility] Embedded Care Gaps. Reference number: [id number]. 5/12/2022   9:50:30 AM CDT

## 2022-05-13 NOTE — TELEPHONE ENCOUNTER
Last Office Visit Info:   The patient's last visit with Staci Sorenson MD was on 3/29/2022.    The patient's last visit in current department was on 3/29/2022.        Last CBC Results:   Lab Results   Component Value Date    WBC 6.50 12/04/2019    HGB 12.0 12/04/2019    HCT 38.3 12/04/2019     12/04/2019       Last CMP Results  Lab Results   Component Value Date     12/04/2019    K 4.5 12/04/2019     12/04/2019    CO2 25 12/04/2019    BUN 13 12/04/2019    CREATININE 0.78 12/04/2019    CALCIUM 9.1 12/04/2019    ALBUMIN 4.1 12/04/2019    AST 29 12/04/2019    ALT 17 12/04/2019       Last Lipids  Lab Results   Component Value Date    CHOL 235 (H) 12/04/2019    TRIG 109 12/04/2019    HDL 51 12/04/2019    LDLCALC 162.2 (H) 12/04/2019       Last A1C  No results found for: HGBA1C    Last TSH  Lab Results   Component Value Date    TSH 1.290 12/04/2019             Current Med Refills  Medication List with Changes/Refills   Current Medications    ALPRAZOLAM (XANAX) 0.5 MG TABLET    Take 1 tablet (0.5 mg total) by mouth 2 (two) times daily.       Start Date: 3/29/2022 End Date: --    ESOMEPRAZOLE (NEXIUM) 40 MG CAPSULE    Take 40 mg by mouth once daily.       Start Date: --        End Date: --    FERROUS SULFATE (IRON) 325 MG (65 MG IRON) CPSR    Take by mouth.       Start Date: --        End Date: --    GABAPENTIN (NEURONTIN) 300 MG CAPSULE    TAKE 3 CAPSULES BY MOUTH ONCE DAILY IN THE EVENING       Start Date: 11/29/2021End Date: --    NYSTATIN (MYCOSTATIN) POWDER    Apply topically 3 (three) times daily.       Start Date: 7/5/2021  End Date: --    OFLOXACIN (FLOXIN) 0.3 % OTIC SOLUTION    Place 5 drops into the left ear 2 (two) times daily.       Start Date: 4/16/2021 End Date: --    PAROXETINE (PAXIL) 20 MG TABLET    Take 1 tablet (20 mg total) by mouth every morning.       Start Date: 10/6/2021 End Date: 10/6/2022    TIZANIDINE (ZANAFLEX) 2 MG TABLET    Take 1 tablet by mouth in the evening        Start Date: 4/27/2022 End Date: --    TIZANIDINE (ZANAFLEX) 2 MG TABLET    Take 1 tablet (2 mg total) by mouth every evening.       Start Date: 4/27/2022 End Date: --    TOPIRAMATE (TOPAMAX) 100 MG TABLET    Take 1 tablet (100 mg total) by mouth once daily.       Start Date: 3/29/2022 End Date: --    ZOLPIDEM (AMBIEN) 10 MG TAB    TAKE 1 TABLET BY MOUTH NIGHTLY AS NEEDED FOR INSOMNIA       Start Date: 5/10/2022 End Date: --

## 2022-05-17 RX ORDER — ZOLPIDEM TARTRATE 10 MG/1
TABLET ORAL
Qty: 30 TABLET | Refills: 5 | OUTPATIENT
Start: 2022-05-17

## 2022-05-17 RX ORDER — ZOLPIDEM TARTRATE 10 MG/1
TABLET ORAL
Qty: 30 TABLET | Refills: 0 | Status: SHIPPED | OUTPATIENT
Start: 2022-05-17 | End: 2022-10-05 | Stop reason: SDUPTHER

## 2022-06-29 DIAGNOSIS — M62.838 MUSCLE SPASM: ICD-10-CM

## 2022-06-29 DIAGNOSIS — G62.9 NEUROPATHY: ICD-10-CM

## 2022-06-29 DIAGNOSIS — G43.009 MIGRAINE WITHOUT AURA AND WITHOUT STATUS MIGRAINOSUS, NOT INTRACTABLE: ICD-10-CM

## 2022-06-29 NOTE — TELEPHONE ENCOUNTER
No new care gaps identified.  [facility] Embedded Care Gaps. Reference number: [id number]. 6/29/2022   8:55:49 AM RADHAT

## 2022-06-29 NOTE — TELEPHONE ENCOUNTER
Last Office Visit Info:   The patient's last visit with Staci Sorenson MD was on 3/29/2022.    The patient's last visit in current department was on 3/29/2022.        Last CBC Results:   Lab Results   Component Value Date    WBC 6.50 12/04/2019    HGB 12.0 12/04/2019    HCT 38.3 12/04/2019     12/04/2019       Last CMP Results  Lab Results   Component Value Date     12/04/2019    K 4.5 12/04/2019     12/04/2019    CO2 25 12/04/2019    BUN 13 12/04/2019    CREATININE 0.78 12/04/2019    CALCIUM 9.1 12/04/2019    ALBUMIN 4.1 12/04/2019    AST 29 12/04/2019    ALT 17 12/04/2019       Last Lipids  Lab Results   Component Value Date    CHOL 235 (H) 12/04/2019    TRIG 109 12/04/2019    HDL 51 12/04/2019    LDLCALC 162.2 (H) 12/04/2019       Last A1C  No results found for: HGBA1C    Last TSH  Lab Results   Component Value Date    TSH 1.290 12/04/2019             Current Med Refills  Medication List with Changes/Refills   Current Medications    ALPRAZOLAM (XANAX) 0.5 MG TABLET    Take 1 tablet (0.5 mg total) by mouth 2 (two) times daily.       Start Date: 3/29/2022 End Date: --    ESOMEPRAZOLE (NEXIUM) 40 MG CAPSULE    Take 40 mg by mouth once daily.       Start Date: --        End Date: --    FERROUS SULFATE (IRON) 325 MG (65 MG IRON) CPSR    Take by mouth.       Start Date: --        End Date: --    GABAPENTIN (NEURONTIN) 300 MG CAPSULE    TAKE 3 CAPSULES BY MOUTH ONCE DAILY IN THE EVENING       Start Date: 11/29/2021End Date: --    NYSTATIN (MYCOSTATIN) POWDER    Apply topically 3 (three) times daily.       Start Date: 7/5/2021  End Date: --    OFLOXACIN (FLOXIN) 0.3 % OTIC SOLUTION    Place 5 drops into the left ear 2 (two) times daily.       Start Date: 4/16/2021 End Date: --    PAROXETINE (PAXIL) 20 MG TABLET    Take 1 tablet (20 mg total) by mouth every morning.       Start Date: 10/6/2021 End Date: 10/6/2022    TIZANIDINE (ZANAFLEX) 2 MG TABLET    Take 1 tablet by mouth in the evening        Start Date: 4/27/2022 End Date: --    TIZANIDINE (ZANAFLEX) 2 MG TABLET    Take 1 tablet (2 mg total) by mouth every evening.       Start Date: 4/27/2022 End Date: --    TOPIRAMATE (TOPAMAX) 100 MG TABLET    Take 1 tablet (100 mg total) by mouth once daily.       Start Date: 3/29/2022 End Date: --    ZOLPIDEM (AMBIEN) 10 MG TAB    TAKE 1 TABLET BY MOUTH NIGHTLY AS NEEDED FOR INSOMNIA       Start Date: 5/17/2022 End Date: --

## 2022-07-01 RX ORDER — TIZANIDINE 2 MG/1
2 TABLET ORAL NIGHTLY
Qty: 30 TABLET | Refills: 0 | Status: SHIPPED | OUTPATIENT
Start: 2022-07-01 | End: 2023-03-15 | Stop reason: SDUPTHER

## 2022-07-01 RX ORDER — TOPIRAMATE 100 MG/1
100 TABLET, FILM COATED ORAL DAILY
Qty: 90 TABLET | Refills: 1 | Status: SHIPPED | OUTPATIENT
Start: 2022-07-01 | End: 2022-10-05 | Stop reason: SDUPTHER

## 2022-07-01 RX ORDER — GABAPENTIN 300 MG/1
300 CAPSULE ORAL 3 TIMES DAILY
Qty: 90 CAPSULE | Refills: 5 | Status: SHIPPED | OUTPATIENT
Start: 2022-07-01 | End: 2023-02-07 | Stop reason: SDUPTHER

## 2022-10-05 ENCOUNTER — OFFICE VISIT (OUTPATIENT)
Dept: FAMILY MEDICINE | Facility: CLINIC | Age: 42
End: 2022-10-05
Payer: COMMERCIAL

## 2022-10-05 VITALS
DIASTOLIC BLOOD PRESSURE: 84 MMHG | SYSTOLIC BLOOD PRESSURE: 130 MMHG | HEIGHT: 62 IN | WEIGHT: 207.25 LBS | OXYGEN SATURATION: 98 % | HEART RATE: 74 BPM | TEMPERATURE: 98 F | BODY MASS INDEX: 38.14 KG/M2

## 2022-10-05 DIAGNOSIS — Z00.00 ANNUAL PHYSICAL EXAM: ICD-10-CM

## 2022-10-05 DIAGNOSIS — G43.009 MIGRAINE WITHOUT AURA AND WITHOUT STATUS MIGRAINOSUS, NOT INTRACTABLE: ICD-10-CM

## 2022-10-05 DIAGNOSIS — F41.9 ANXIETY: ICD-10-CM

## 2022-10-05 DIAGNOSIS — G47.00 INSOMNIA, UNSPECIFIED TYPE: ICD-10-CM

## 2022-10-05 DIAGNOSIS — Z12.39 ENCOUNTER FOR SCREENING FOR MALIGNANT NEOPLASM OF BREAST, UNSPECIFIED SCREENING MODALITY: Primary | ICD-10-CM

## 2022-10-05 DIAGNOSIS — T75.3XXA SEA SICKNESS, INITIAL ENCOUNTER: ICD-10-CM

## 2022-10-05 PROCEDURE — 99999 PR PBB SHADOW E&M-EST. PATIENT-LVL III: ICD-10-PCS | Mod: PBBFAC,,, | Performed by: FAMILY MEDICINE

## 2022-10-05 PROCEDURE — 1159F PR MEDICATION LIST DOCUMENTED IN MEDICAL RECORD: ICD-10-PCS | Mod: CPTII,S$GLB,, | Performed by: FAMILY MEDICINE

## 2022-10-05 PROCEDURE — 3008F PR BODY MASS INDEX (BMI) DOCUMENTED: ICD-10-PCS | Mod: CPTII,S$GLB,, | Performed by: FAMILY MEDICINE

## 2022-10-05 PROCEDURE — 90471 IMMUNIZATION ADMIN: CPT | Mod: S$GLB,,, | Performed by: FAMILY MEDICINE

## 2022-10-05 PROCEDURE — 99999 PR PBB SHADOW E&M-EST. PATIENT-LVL III: CPT | Mod: PBBFAC,,, | Performed by: FAMILY MEDICINE

## 2022-10-05 PROCEDURE — 99396 PREV VISIT EST AGE 40-64: CPT | Mod: 25,S$GLB,, | Performed by: FAMILY MEDICINE

## 2022-10-05 PROCEDURE — 3079F DIAST BP 80-89 MM HG: CPT | Mod: CPTII,S$GLB,, | Performed by: FAMILY MEDICINE

## 2022-10-05 PROCEDURE — 90686 IIV4 VACC NO PRSV 0.5 ML IM: CPT | Mod: S$GLB,,, | Performed by: FAMILY MEDICINE

## 2022-10-05 PROCEDURE — 1160F PR REVIEW ALL MEDS BY PRESCRIBER/CLIN PHARMACIST DOCUMENTED: ICD-10-PCS | Mod: CPTII,S$GLB,, | Performed by: FAMILY MEDICINE

## 2022-10-05 PROCEDURE — 1160F RVW MEDS BY RX/DR IN RCRD: CPT | Mod: CPTII,S$GLB,, | Performed by: FAMILY MEDICINE

## 2022-10-05 PROCEDURE — 1159F MED LIST DOCD IN RCRD: CPT | Mod: CPTII,S$GLB,, | Performed by: FAMILY MEDICINE

## 2022-10-05 PROCEDURE — 3075F PR MOST RECENT SYSTOLIC BLOOD PRESS GE 130-139MM HG: ICD-10-PCS | Mod: CPTII,S$GLB,, | Performed by: FAMILY MEDICINE

## 2022-10-05 PROCEDURE — 90471 FLU VACCINE (QUAD) GREATER THAN OR EQUAL TO 3YO PRESERVATIVE FREE IM: ICD-10-PCS | Mod: S$GLB,,, | Performed by: FAMILY MEDICINE

## 2022-10-05 PROCEDURE — 3075F SYST BP GE 130 - 139MM HG: CPT | Mod: CPTII,S$GLB,, | Performed by: FAMILY MEDICINE

## 2022-10-05 PROCEDURE — 90686 FLU VACCINE (QUAD) GREATER THAN OR EQUAL TO 3YO PRESERVATIVE FREE IM: ICD-10-PCS | Mod: S$GLB,,, | Performed by: FAMILY MEDICINE

## 2022-10-05 PROCEDURE — 3079F PR MOST RECENT DIASTOLIC BLOOD PRESSURE 80-89 MM HG: ICD-10-PCS | Mod: CPTII,S$GLB,, | Performed by: FAMILY MEDICINE

## 2022-10-05 PROCEDURE — 99396 PR PREVENTIVE VISIT,EST,40-64: ICD-10-PCS | Mod: 25,S$GLB,, | Performed by: FAMILY MEDICINE

## 2022-10-05 PROCEDURE — 3008F BODY MASS INDEX DOCD: CPT | Mod: CPTII,S$GLB,, | Performed by: FAMILY MEDICINE

## 2022-10-05 RX ORDER — ALPRAZOLAM 0.5 MG/1
0.5 TABLET ORAL 2 TIMES DAILY
Qty: 60 TABLET | Refills: 5 | Status: SHIPPED | OUTPATIENT
Start: 2022-10-05 | End: 2023-03-15 | Stop reason: SDUPTHER

## 2022-10-05 RX ORDER — ZOLPIDEM TARTRATE 10 MG/1
10 TABLET ORAL NIGHTLY
Qty: 30 TABLET | Refills: 5 | Status: SHIPPED | OUTPATIENT
Start: 2022-10-05 | End: 2023-03-15 | Stop reason: SDUPTHER

## 2022-10-05 RX ORDER — SCOLOPAMINE TRANSDERMAL SYSTEM 1 MG/1
1 PATCH, EXTENDED RELEASE TRANSDERMAL
Qty: 10 PATCH | Refills: 0 | Status: SHIPPED | OUTPATIENT
Start: 2022-10-05 | End: 2022-10-15

## 2022-10-05 RX ORDER — TOPIRAMATE 100 MG/1
100 TABLET, FILM COATED ORAL DAILY
Qty: 90 TABLET | Refills: 1 | Status: SHIPPED | OUTPATIENT
Start: 2022-10-05 | End: 2023-03-15 | Stop reason: SDUPTHER

## 2022-10-05 NOTE — PROGRESS NOTES
Patient had her mammogram done today at [facility] before visit with Dr. Sorenson/ Will check for report later through care everywhere.

## 2022-10-05 NOTE — PROGRESS NOTES
"Subjective:       Patient ID: Sarah Mukherjee is a 42 y.o. female.    Chief Complaint: Renew Medications    HPI  Review of Systems   Constitutional:  Negative for activity change and unexpected weight change.   HENT:  Negative for hearing loss, rhinorrhea and trouble swallowing.    Eyes:  Negative for discharge and visual disturbance.   Respiratory:  Negative for chest tightness and wheezing.    Cardiovascular:  Negative for chest pain and palpitations.   Gastrointestinal:  Negative for blood in stool, constipation, diarrhea and vomiting.   Endocrine: Negative for polydipsia and polyuria.   Genitourinary:  Negative for difficulty urinating, dysuria, hematuria and menstrual problem.   Musculoskeletal:  Positive for arthralgias. Negative for joint swelling and neck pain.   Neurological:  Negative for weakness and headaches.   Psychiatric/Behavioral:  Negative for confusion and dysphoric mood.        Objective:      Vitals:    10/05/22 0924   BP: 130/84   Pulse: 74   Temp: 98 °F (36.7 °C)   TempSrc: Oral   SpO2: 98%   Weight: 94 kg (207 lb 3.7 oz)   Height: 5' 2" (1.575 m)       Physical Exam    Assessment:       Problem List Items Addressed This Visit    None      Plan:       ***        "

## 2022-10-05 NOTE — PROGRESS NOTES
Subjective:       Patient ID: Sarah Mukherjee is a 42 y.o. female.    Chief Complaint: Renew Medications    42 year old female presents for an annual exam.    She had her mammogram this morning. She is due for refills.     Past Medical History:   Diagnosis Date    Cancer     skin cancer removed    Crohn's disease     Degenerative joint disease of spine     Esophagitis     History of stomach ulcers     Lumbar facet arthropathy 6/20/2014    Neurogenic bladder       Past Surgical History:   Procedure Laterality Date    BREAST RECONSTRUCTION      HYSTERECTOMY      total hysterectomy in 20s due to fibroid, endometriosis    INJECTION OF ANESTHETIC AGENT AROUND NERVE Left 1/4/2019    Procedure: Block, Nerve MEDIAL BRANCH BLOCKS LEFT LUMBAR L5-S3;  Surgeon: Andra Burt MD;  Location: [facility];  Service: Pain Management;  Laterality: Left;  1 OF 2    INJECTION OF JOINT Left 6/28/2019    Procedure: INJECTION, JOINT COCCYX;  Surgeon: Andra Burt MD;  Location: [facility];  Service: Pain Management;  Laterality: Left;  INJECT JOINt & LEFT SIDE    PELVIC LAPAROSCOPY      TONSILLECTOMY       Family History   Problem Relation Age of Onset    No Known Problems Father     Diabetes Mother     Hypertension Mother     Breast cancer Maternal Aunt 47    Ovarian cancer Maternal Aunt      Social History     Socioeconomic History    Marital status:    Tobacco Use    Smoking status: Never    Smokeless tobacco: Never   Substance and Sexual Activity    Alcohol use: Yes     Comment: occ    Drug use: No    Sexual activity: Yes     Social Determinants of Health     Financial Resource Strain: Low Risk     Difficulty of Paying Living Expenses: Not hard at all   Food Insecurity: No Food Insecurity    Worried About Running Out of Food in the Last Year: Never true    Ran Out of Food in the Last Year: Never true   Transportation Needs: No Transportation Needs    Lack of Transportation (Medical): No    Lack of Transportation  "(Non-Medical): No   Physical Activity: Sufficiently Active    Days of Exercise per Week: 5 days    Minutes of Exercise per Session: 30 min   Stress: No Stress Concern Present    Feeling of Stress : Only a little   Social Connections: Unknown    Frequency of Communication with Friends and Family: More than three times a week    Frequency of Social Gatherings with Friends and Family: More than three times a week    Active Member of Clubs or Organizations: No    Attends Club or Organization Meetings: Patient refused    Marital Status:    Housing Stability: Low Risk     Unable to Pay for Housing in the Last Year: No    Number of Places Lived in the Last Year: 1    Unstable Housing in the Last Year: No       Review of Systems   Constitutional:  Negative for activity change and unexpected weight change.   HENT:  Negative for hearing loss, rhinorrhea and trouble swallowing.    Eyes:  Negative for discharge and visual disturbance.   Respiratory:  Negative for chest tightness and wheezing.    Cardiovascular:  Negative for chest pain and palpitations.   Gastrointestinal:  Negative for blood in stool, constipation, diarrhea and vomiting.   Endocrine: Negative for polydipsia and polyuria.   Genitourinary:  Negative for difficulty urinating, dysuria, hematuria and menstrual problem.   Musculoskeletal:  Positive for arthralgias. Negative for joint swelling and neck pain.   Neurological:  Negative for weakness and headaches.   Psychiatric/Behavioral:  Negative for confusion and dysphoric mood.        Objective:      Vitals:    10/05/22 0924   BP: 130/84   Pulse: 74   Temp: 98 °F (36.7 °C)   TempSrc: Oral   SpO2: 98%   Weight: 94 kg (207 lb 3.7 oz)   Height: 5' 2" (1.575 m)       Physical Exam  Constitutional:       General: She is not in acute distress.     Appearance: Normal appearance. She is well-developed. She is not ill-appearing, toxic-appearing or diaphoretic.   HENT:      Head: Normocephalic and atraumatic.      " Right Ear: External ear normal.      Left Ear: External ear normal.      Mouth/Throat:      Pharynx: No oropharyngeal exudate.   Eyes:      Conjunctiva/sclera: Conjunctivae normal.   Neck:      Thyroid: No thyromegaly.   Cardiovascular:      Rate and Rhythm: Normal rate and regular rhythm.      Heart sounds: Normal heart sounds. No murmur heard.    No friction rub. No gallop.   Pulmonary:      Effort: Pulmonary effort is normal. No respiratory distress.      Breath sounds: Normal breath sounds. No stridor. No wheezing, rhonchi or rales.   Abdominal:      General: Bowel sounds are normal. There is no distension.      Palpations: Abdomen is soft. There is no mass.      Tenderness: There is no abdominal tenderness. There is no guarding or rebound.      Hernia: No hernia is present.   Musculoskeletal:      Cervical back: Normal range of motion and neck supple.   Lymphadenopathy:      Cervical: No cervical adenopathy.   Skin:     Findings: No rash.   Neurological:      Mental Status: She is alert.   Psychiatric:         Mood and Affect: Mood normal.       Assessment:       Problem List Items Addressed This Visit    None  Visit Diagnoses       Encounter for screening for malignant neoplasm of breast, unspecified screening modality    -  Primary    Relevant Orders    Mammo Digital Screening Bilat    Annual physical exam        Relevant Orders    Lipid Panel    Comprehensive Metabolic Panel    CBC Auto Differential    TSH    Hemoglobin A1C    Sea sickness, initial encounter        Relevant Medications    scopolamine (TRANSDERM-SCOP) 1.3-1.5 mg (1 mg over 3 days)    Insomnia, unspecified type        Relevant Medications    zolpidem (AMBIEN) 10 mg Tab    Migraine without aura and without status migrainosus, not intractable        Relevant Medications    topiramate (TOPAMAX) 100 MG tablet    Anxiety        Relevant Medications    ALPRAZolam (XANAX) 0.5 MG tablet              Plan:       Sarah was seen today for renew  medications.    Diagnoses and all orders for this visit:    Encounter for screening for malignant neoplasm of breast, unspecified screening modality  -     Mammo Digital Screening Bilat; Future    Annual physical exam  -     Lipid Panel; Future  -     Comprehensive Metabolic Panel; Future  -     CBC Auto Differential; Future  -     TSH; Future  -     Hemoglobin A1C; Future    Sea sickness, initial encounter  -     scopolamine (TRANSDERM-SCOP) 1.3-1.5 mg (1 mg over 3 days); Place 1 patch onto the skin Every 3 (three) days. Apply behind ear 30 min prior to event. Replace q3 days. for 10 days    Insomnia, unspecified type  -     zolpidem (AMBIEN) 10 mg Tab; Take 1 tablet (10 mg total) by mouth every evening.    Migraine without aura and without status migrainosus, not intractable  -     topiramate (TOPAMAX) 100 MG tablet; Take 1 tablet (100 mg total) by mouth once daily.    Anxiety  -     ALPRAZolam (XANAX) 0.5 MG tablet; Take 1 tablet (0.5 mg total) by mouth 2 (two) times daily.

## 2022-10-17 NOTE — TELEPHONE ENCOUNTER
Patient returned call and was given negative urine culture results . She verbalized her understanding .   
[de-identified] : 39-year-old female continued pain discomfort in the right wrist.  She he had a work-related injury.  Id she still complaining about right-sided wrist pain discomfort.  She is working with this mild partial temporary disability.  She did have an MRI done.

## 2022-11-22 ENCOUNTER — TELEPHONE (OUTPATIENT)
Dept: FAMILY MEDICINE | Facility: CLINIC | Age: 42
End: 2022-11-22
Payer: COMMERCIAL

## 2022-11-22 NOTE — TELEPHONE ENCOUNTER
----- Message from Janelle Thompson sent at 11/22/2022  8:16 AM CST -----  Regarding: Prior Authorization  Contact: Kiraa @ [phone]  Kiara from [facility] is calling because Prior Authorization is needed for medication tiZANidine (ZANAFLEX) 2 MG tablet. Asking for a call back

## 2022-11-22 NOTE — TELEPHONE ENCOUNTER
Return call to Lisha, unable to complete call. They don't have a reference number and cannot access anything regarding this patient without the number.

## 2023-03-15 ENCOUNTER — OFFICE VISIT (OUTPATIENT)
Dept: FAMILY MEDICINE | Facility: CLINIC | Age: 43
End: 2023-03-15
Payer: MEDICARE

## 2023-03-15 VITALS
WEIGHT: 214.75 LBS | SYSTOLIC BLOOD PRESSURE: 146 MMHG | TEMPERATURE: 98 F | OXYGEN SATURATION: 99 % | HEIGHT: 62 IN | HEART RATE: 78 BPM | DIASTOLIC BLOOD PRESSURE: 100 MMHG | BODY MASS INDEX: 39.52 KG/M2

## 2023-03-15 DIAGNOSIS — R03.0 ELEVATED BLOOD PRESSURE READING: Primary | ICD-10-CM

## 2023-03-15 DIAGNOSIS — G43.009 MIGRAINE WITHOUT AURA AND WITHOUT STATUS MIGRAINOSUS, NOT INTRACTABLE: ICD-10-CM

## 2023-03-15 DIAGNOSIS — G47.00 INSOMNIA, UNSPECIFIED TYPE: ICD-10-CM

## 2023-03-15 DIAGNOSIS — F41.9 ANXIETY: ICD-10-CM

## 2023-03-15 DIAGNOSIS — M62.838 MUSCLE SPASM: ICD-10-CM

## 2023-03-15 PROCEDURE — 99213 OFFICE O/P EST LOW 20 MIN: CPT | Mod: PBBFAC,PO | Performed by: FAMILY MEDICINE

## 2023-03-15 PROCEDURE — 99214 OFFICE O/P EST MOD 30 MIN: CPT | Mod: S$PBB,,, | Performed by: FAMILY MEDICINE

## 2023-03-15 PROCEDURE — 99999 PR PBB SHADOW E&M-EST. PATIENT-LVL III: CPT | Mod: PBBFAC,,, | Performed by: FAMILY MEDICINE

## 2023-03-15 PROCEDURE — 99999 PR PBB SHADOW E&M-EST. PATIENT-LVL III: ICD-10-PCS | Mod: PBBFAC,,, | Performed by: FAMILY MEDICINE

## 2023-03-15 PROCEDURE — 99214 PR OFFICE/OUTPT VISIT, EST, LEVL IV, 30-39 MIN: ICD-10-PCS | Mod: S$PBB,,, | Performed by: FAMILY MEDICINE

## 2023-03-15 RX ORDER — ATENOLOL 25 MG/1
25 TABLET ORAL DAILY
Qty: 30 TABLET | Refills: 11 | Status: SHIPPED | OUTPATIENT
Start: 2023-03-15 | End: 2024-03-08

## 2023-03-15 RX ORDER — TOPIRAMATE 100 MG/1
100 TABLET, FILM COATED ORAL DAILY
Qty: 90 TABLET | Refills: 1 | Status: SHIPPED | OUTPATIENT
Start: 2023-03-15 | End: 2023-09-19 | Stop reason: SDUPTHER

## 2023-03-15 RX ORDER — TIZANIDINE 2 MG/1
2 TABLET ORAL NIGHTLY
Qty: 30 TABLET | Refills: 0 | Status: CANCELLED | OUTPATIENT
Start: 2023-03-15

## 2023-03-15 RX ORDER — ZOLPIDEM TARTRATE 10 MG/1
10 TABLET ORAL NIGHTLY
Qty: 30 TABLET | Refills: 5 | Status: SHIPPED | OUTPATIENT
Start: 2023-03-15 | End: 2023-09-19 | Stop reason: SDUPTHER

## 2023-03-15 RX ORDER — ALPRAZOLAM 0.5 MG/1
0.5 TABLET ORAL 2 TIMES DAILY
Qty: 60 TABLET | Refills: 5 | Status: SHIPPED | OUTPATIENT
Start: 2023-03-15 | End: 2023-09-19 | Stop reason: SDUPTHER

## 2023-03-15 NOTE — PROGRESS NOTES
Answers submitted by the patient for this visit:  Review of Systems Questionnaire (Submitted on 3/15/2023)  activity change: No  unexpected weight change: No  neck pain: No  hearing loss: No  rhinorrhea: No  trouble swallowing: No  eye discharge: No  visual disturbance: No  chest tightness: No  wheezing: No  chest pain: No  palpitations: Yes  blood in stool: No  constipation: No  vomiting: No  diarrhea: No  polydipsia: No  polyuria: No  difficulty urinating: No  hematuria: No  menstrual problem: No  dysuria: No  joint swelling: No  arthralgias: Yes  headaches: No  weakness: No  confusion: No  dysphoric mood: No  Answers submitted by the patient for this visit:  Review of Systems Questionnaire (Submitted on 3/15/2023)  activity change: No  unexpected weight change: No  neck pain: No  hearing loss: No  rhinorrhea: No  trouble swallowing: No  eye discharge: No  visual disturbance: No  chest tightness: No  wheezing: No  chest pain: No  palpitations: Yes  blood in stool: No  constipation: No  vomiting: No  diarrhea: No  polydipsia: No  polyuria: No  difficulty urinating: No  hematuria: No  menstrual problem: No  dysuria: No  joint swelling: No  arthralgias: Yes  headaches: No  weakness: No  confusion: No  dysphoric mood: No

## 2023-03-15 NOTE — PROGRESS NOTES
Subjective:       Patient ID: Sarah Mukherjee is a 43 y.o. female.    Chief Complaint: Hypertension, Anxiety, and Medication Refill    43 year old female presents for refills of her ambien for sleep and topamax for headaches. She is supposed to have a spinal stimulator placed and her blood pressure is elevated. She is in pain due to her back. Her surgeon wants her blood pressure controlled better prior to going in .    Past Medical History:   Diagnosis Date    Cancer     skin cancer removed    Crohn's disease     Degenerative joint disease of spine     Esophagitis     History of stomach ulcers     Lumbar facet arthropathy 6/20/2014    Neurogenic bladder       Past Surgical History:   Procedure Laterality Date    BREAST RECONSTRUCTION      HYSTERECTOMY      total hysterectomy in 20s due to fibroid, endometriosis    INJECTION OF ANESTHETIC AGENT AROUND NERVE Left 1/4/2019    Procedure: Block, Nerve MEDIAL BRANCH BLOCKS LEFT LUMBAR L5-S3;  Surgeon: Andra Burt MD;  Location: [facility];  Service: Pain Management;  Laterality: Left;  1 OF 2    INJECTION OF JOINT Left 6/28/2019    Procedure: INJECTION, JOINT COCCYX;  Surgeon: Andra Burt MD;  Location: [facility] [place];  Service: Pain Management;  Laterality: Left;  INJECT JOINt & LEFT SIDE    PELVIC LAPAROSCOPY      TONSILLECTOMY       Family History   Problem Relation Age of Onset    No Known Problems Father     Diabetes Mother     Hypertension Mother     Breast cancer Maternal Aunt 47    Ovarian cancer Maternal Aunt      Social History     Socioeconomic History    Marital status:    Tobacco Use    Smoking status: Never    Smokeless tobacco: Never   Substance and Sexual Activity    Alcohol use: Yes     Comment: occ    Drug use: No    Sexual activity: Yes     Social Determinants of Health     Financial Resource Strain: Low Risk     Difficulty of Paying Living Expenses: Not hard at all   Food Insecurity: No Food Insecurity    Worried About Running Out  "of Food in the Last Year: Never true    Ran Out of Food in the Last Year: Never true   Transportation Needs: No Transportation Needs    Lack of Transportation (Medical): No    Lack of Transportation (Non-Medical): No   Physical Activity: Insufficiently Active    Days of Exercise per Week: 1 day    Minutes of Exercise per Session: 10 min   Stress: No Stress Concern Present    Feeling of Stress : Not at all   Social Connections: Unknown    Frequency of Communication with Friends and Family: More than three times a week    Frequency of Social Gatherings with Friends and Family: Once a week    Active Member of Clubs or Organizations: Yes    Attends Club or Organization Meetings: More than 4 times per year    Marital Status:    Housing Stability: Low Risk     Unable to Pay for Housing in the Last Year: No    Number of Places Lived in the Last Year: 1    Unstable Housing in the Last Year: No       Review of Systems   HENT:  Negative for hearing loss, rhinorrhea and trouble swallowing.    Respiratory:  Negative for chest tightness and wheezing.        Objective:      Vitals:    03/15/23 0834   BP: (!) 150/100   Pulse: 78   Temp: 98.1 °F (36.7 °C)   TempSrc: Oral   SpO2: 99%   Weight: 97.4 kg (214 lb 11.7 oz)   Height: 5' 2" (1.575 m)       Physical Exam  Constitutional:       General: She is not in acute distress.     Appearance: Normal appearance. She is well-developed. She is not ill-appearing, toxic-appearing or diaphoretic.   HENT:      Head: Normocephalic and atraumatic.   Eyes:      Conjunctiva/sclera: Conjunctivae normal.   Neck:      Vascular: No carotid bruit.   Cardiovascular:      Rate and Rhythm: Regular rhythm.      Heart sounds: Normal heart sounds. No murmur heard.    No friction rub. No gallop.   Pulmonary:      Effort: Pulmonary effort is normal. No respiratory distress.      Breath sounds: Normal breath sounds. No stridor. No wheezing, rhonchi or rales.   Abdominal:      General: Abdomen is flat. " Bowel sounds are normal. There is no distension.      Palpations: Abdomen is soft. There is no mass.      Tenderness: There is no abdominal tenderness. There is no guarding or rebound.      Hernia: No hernia is present.   Musculoskeletal:      Cervical back: Normal range of motion and neck supple.   Neurological:      Mental Status: She is alert and oriented to person, place, and time.   Psychiatric:         Mood and Affect: Mood is anxious.       Assessment:       Problem List Items Addressed This Visit    None  Visit Diagnoses       Elevated blood pressure reading    -  Primary    Relevant Medications    atenoloL (TENORMIN) 25 MG tablet    Anxiety        Relevant Medications    ALPRAZolam (XANAX) 0.5 MG tablet    Insomnia, unspecified type        Relevant Medications    zolpidem (AMBIEN) 10 mg Tab    Muscle spasm        Migraine without aura and without status migrainosus, not intractable        Relevant Medications    topiramate (TOPAMAX) 100 MG tablet              Plan:       Sarah was seen today for hypertension, anxiety and medication refill.    Diagnoses and all orders for this visit:    Elevated blood pressure reading  -     atenoloL (TENORMIN) 25 MG tablet; Take 1 tablet (25 mg total) by mouth once daily.  Starting atenolol for her blood pressure and hopefully it will help with anxieyt as well    Anxiety  -     ALPRAZolam (XANAX) 0.5 MG tablet; Take 1 tablet (0.5 mg total) by mouth 2 (two) times daily.    Insomnia, unspecified type  -     zolpidem (AMBIEN) 10 mg Tab; Take 1 tablet (10 mg total) by mouth every evening.  Stable. Refilled meds.       Migraine without aura and without status migrainosus, not intractable  -     topiramate (TOPAMAX) 100 MG tablet; Take 1 tablet (100 mg total) by mouth once daily.  Stable. Refilled meds.     Other orders  The following orders have not been finalized:  -     Cancel: tiZANidine (ZANAFLEX) 2 MG tablet

## 2023-03-17 ENCOUNTER — PATIENT MESSAGE (OUTPATIENT)
Dept: FAMILY MEDICINE | Facility: CLINIC | Age: 43
End: 2023-03-17
Payer: MEDICARE

## 2023-06-20 ENCOUNTER — OFFICE VISIT (OUTPATIENT)
Dept: URGENT CARE | Facility: CLINIC | Age: 43
End: 2023-06-20
Payer: MEDICARE

## 2023-06-20 VITALS
OXYGEN SATURATION: 97 % | DIASTOLIC BLOOD PRESSURE: 71 MMHG | HEIGHT: 62 IN | BODY MASS INDEX: 40.3 KG/M2 | RESPIRATION RATE: 20 BRPM | HEART RATE: 70 BPM | WEIGHT: 219 LBS | SYSTOLIC BLOOD PRESSURE: 118 MMHG | TEMPERATURE: 99 F

## 2023-06-20 DIAGNOSIS — R31.9 URINARY TRACT INFECTION WITH HEMATURIA, SITE UNSPECIFIED: ICD-10-CM

## 2023-06-20 DIAGNOSIS — N39.0 URINARY TRACT INFECTION WITH HEMATURIA, SITE UNSPECIFIED: ICD-10-CM

## 2023-06-20 DIAGNOSIS — R10.9 SIDE PAIN: Primary | ICD-10-CM

## 2023-06-20 LAB
BILIRUB UR QL STRIP: NEGATIVE
GLUCOSE UR QL STRIP: NEGATIVE
KETONES UR QL STRIP: NEGATIVE
LEUKOCYTE ESTERASE UR QL STRIP: POSITIVE
PH, POC UA: 5
POC BLOOD, URINE: POSITIVE
POC NITRATES, URINE: NEGATIVE
PROT UR QL STRIP: POSITIVE
SP GR UR STRIP: 1 (ref 1–1.03)
UROBILINOGEN UR STRIP-ACNC: ABNORMAL (ref 0.1–1.1)

## 2023-06-20 PROCEDURE — 99214 PR OFFICE/OUTPT VISIT, EST, LEVL IV, 30-39 MIN: ICD-10-PCS | Mod: S$GLB,,, | Performed by: PHYSICIAN ASSISTANT

## 2023-06-20 PROCEDURE — 81003 URINALYSIS AUTO W/O SCOPE: CPT | Mod: QW,S$GLB,, | Performed by: PHYSICIAN ASSISTANT

## 2023-06-20 PROCEDURE — 81003 POCT URINALYSIS, DIPSTICK, AUTOMATED, W/O SCOPE: ICD-10-PCS | Mod: QW,S$GLB,, | Performed by: PHYSICIAN ASSISTANT

## 2023-06-20 PROCEDURE — 99214 OFFICE O/P EST MOD 30 MIN: CPT | Mod: S$GLB,,, | Performed by: PHYSICIAN ASSISTANT

## 2023-06-20 RX ORDER — SULFAMETHOXAZOLE AND TRIMETHOPRIM 800; 160 MG/1; MG/1
1 TABLET ORAL 2 TIMES DAILY
Qty: 14 TABLET | Refills: 0 | Status: SHIPPED | OUTPATIENT
Start: 2023-06-20 | End: 2023-09-19

## 2023-06-20 NOTE — PROGRESS NOTES
"Subjective:      Patient ID: Sarah Mukherjee is a 43 y.o. female.    Vitals:  height is 5' 2" (1.575 m) and weight is 99.3 kg (219 lb). Her oral temperature is 98.7 °F (37.1 °C). Her blood pressure is 118/71 and her pulse is 70. Her respiration is 20 and oxygen saturation is 97%.     Chief Complaint: Flank Pain    Pt complain of a side pain that started 2 days ago. Pt did not take anything for relief.  She states that she has a neurogenic bladder and has to self cath but can tell that she has a significant UTI now because the urine is very strong foul smelling and very dirty.  Which is not normal for her    Flank Pain  This is a new problem. Episode onset: 2 days ago. The problem occurs constantly. The problem has been gradually worsening since onset. Pain location: side. The quality of the pain is described as aching. The pain is at a severity of 8/10. The pain is moderate. The pain is The same all the time. Stiffness is present All day. Pertinent negatives include no abdominal pain, bladder incontinence, bowel incontinence, chest pain, fever, headaches, leg pain, numbness, paresis, paresthesias, pelvic pain, perianal numbness, tingling, weakness or weight loss. She has tried nothing for the symptoms. The treatment provided no relief.     Constitution: Negative for fever.   Cardiovascular:  Negative for chest pain.   Gastrointestinal:  Negative for abdominal pain and bowel incontinence.   Genitourinary:  Positive for flank pain. Negative for bladder incontinence and pelvic pain.   Neurological:  Negative for headaches and numbness.    Objective:     Physical Exam   Constitutional: She is oriented to person, place, and time. She appears well-developed.   HENT:   Head: Normocephalic and atraumatic.   Ears:   Right Ear: External ear normal.   Left Ear: External ear normal.   Nose: Nose normal.   Mouth/Throat: Mucous membranes are normal.   Eyes: Conjunctivae and lids are normal.   Neck: Trachea normal. Neck supple. "   Cardiovascular: Normal rate, regular rhythm and normal heart sounds.   Pulmonary/Chest: Effort normal and breath sounds normal. No respiratory distress.   Abdominal: Normal appearance and bowel sounds are normal. She exhibits no distension and no mass. Soft. There is no abdominal tenderness. There is no rebound, no guarding, no left CVA tenderness and no right CVA tenderness.   Musculoskeletal: Normal range of motion.         General: Normal range of motion.   Neurological: She is alert and oriented to person, place, and time. She has normal strength.   Skin: Skin is warm, dry, intact, not diaphoretic and not pale.   Psychiatric: Her speech is normal and behavior is normal. Judgment and thought content normal.   Nursing note and vitals reviewed.  Results for orders placed or performed in visit on 06/20/23   POCT Urinalysis, Dipstick, Automated, W/O Scope   Result Value Ref Range    POC Blood, Urine Positive (A) Negative    POC Bilirubin, Urine Negative Negative    POC Urobilinogen, Urine norm 0.1 - 1.1    POC Ketones, Urine Negative Negative    POC Protein, Urine Positive (A) Negative    POC Nitrates, Urine Negative Negative    POC Glucose, Urine Negative Negative    pH, UA 5.0     POC Specific Gravity, Urine 1.005 1.003 - 1.029    POC Leukocytes, Urine Positive (A) Negative    No results found.   Assessment:     1. Side pain    2. Urinary tract infection with hematuria, site unspecified        Plan:       Side pain  -     POCT Urinalysis, Dipstick, Automated, W/O Scope    Urinary tract infection with hematuria, site unspecified  -     sulfamethoxazole-trimethoprim 800-160mg (BACTRIM DS) 800-160 mg Tab; Take 1 tablet by mouth 2 (two) times daily.  Dispense: 14 tablet; Refill: 0      Follow up if symptoms worsen or fail to improve, for F/U with PCP or ED. There are no Patient Instructions on file for this visit.

## 2023-06-23 ENCOUNTER — TELEPHONE (OUTPATIENT)
Dept: URGENT CARE | Facility: CLINIC | Age: 43
End: 2023-06-23
Payer: MEDICARE

## 2023-08-03 DIAGNOSIS — G62.9 NEUROPATHY: ICD-10-CM

## 2023-08-03 NOTE — TELEPHONE ENCOUNTER
No care due was identified.  [facility] Embedded Care Due Messages. Reference number: [id number].   8/03/2023 9:31:03 AM CDT

## 2023-08-04 RX ORDER — GABAPENTIN 300 MG/1
300 CAPSULE ORAL 3 TIMES DAILY
Qty: 90 CAPSULE | Refills: 0 | Status: SHIPPED | OUTPATIENT
Start: 2023-08-04 | End: 2023-11-10 | Stop reason: SDUPTHER

## 2023-09-19 ENCOUNTER — PATIENT MESSAGE (OUTPATIENT)
Dept: FAMILY MEDICINE | Facility: CLINIC | Age: 43
End: 2023-09-19

## 2023-09-19 ENCOUNTER — OFFICE VISIT (OUTPATIENT)
Dept: FAMILY MEDICINE | Facility: CLINIC | Age: 43
End: 2023-09-19
Payer: MEDICARE

## 2023-09-19 VITALS
SYSTOLIC BLOOD PRESSURE: 124 MMHG | DIASTOLIC BLOOD PRESSURE: 82 MMHG | HEART RATE: 62 BPM | HEIGHT: 62 IN | TEMPERATURE: 98 F | OXYGEN SATURATION: 95 % | WEIGHT: 224 LBS | BODY MASS INDEX: 41.22 KG/M2

## 2023-09-19 DIAGNOSIS — F41.9 ANXIETY: ICD-10-CM

## 2023-09-19 DIAGNOSIS — G47.00 INSOMNIA, UNSPECIFIED TYPE: ICD-10-CM

## 2023-09-19 DIAGNOSIS — E66.01 MORBID OBESITY: ICD-10-CM

## 2023-09-19 DIAGNOSIS — S43.431A SUPERIOR GLENOID LABRUM LESION OF RIGHT SHOULDER, INITIAL ENCOUNTER: Primary | ICD-10-CM

## 2023-09-19 DIAGNOSIS — G43.009 MIGRAINE WITHOUT AURA AND WITHOUT STATUS MIGRAINOSUS, NOT INTRACTABLE: ICD-10-CM

## 2023-09-19 PROCEDURE — 99214 OFFICE O/P EST MOD 30 MIN: CPT | Mod: PBBFAC,PO | Performed by: FAMILY MEDICINE

## 2023-09-19 PROCEDURE — 99214 PR OFFICE/OUTPT VISIT, EST, LEVL IV, 30-39 MIN: ICD-10-PCS | Mod: S$PBB,,, | Performed by: FAMILY MEDICINE

## 2023-09-19 PROCEDURE — 99214 OFFICE O/P EST MOD 30 MIN: CPT | Mod: S$PBB,,, | Performed by: FAMILY MEDICINE

## 2023-09-19 PROCEDURE — 99999 PR PBB SHADOW E&M-EST. PATIENT-LVL IV: CPT | Mod: PBBFAC,,, | Performed by: FAMILY MEDICINE

## 2023-09-19 PROCEDURE — 99999 PR PBB SHADOW E&M-EST. PATIENT-LVL IV: ICD-10-PCS | Mod: PBBFAC,,, | Performed by: FAMILY MEDICINE

## 2023-09-19 RX ORDER — TOPIRAMATE 100 MG/1
100 TABLET, FILM COATED ORAL DAILY
Qty: 90 TABLET | Refills: 1 | Status: SHIPPED | OUTPATIENT
Start: 2023-09-19 | End: 2024-03-10 | Stop reason: SDUPTHER

## 2023-09-19 RX ORDER — ALPRAZOLAM 0.5 MG/1
0.5 TABLET ORAL 2 TIMES DAILY
Qty: 60 TABLET | Refills: 5 | Status: SHIPPED | OUTPATIENT
Start: 2023-09-19 | End: 2024-03-10 | Stop reason: SDUPTHER

## 2023-09-19 NOTE — TELEPHONE ENCOUNTER
No care due was identified.  [facility] Embedded Care Due Messages. Reference number: [id number].   9/19/2023 5:39:50 PM CDT

## 2023-09-19 NOTE — PROGRESS NOTES
"Subjective     Patient ID: Sarah Mukherjee is a 43 y.o. female.    Chief Complaint: Medication Refill, Anxiety, and Insomnia    43 year old with right shoulder pain. She states he was seen by Dr. Mendez She states she has had 2 steroid injections in her shoulder and it didn't improve. She states she was told that she has a slap tear in her shoulder and needs surgery so was referred to Dr. Vasquez. She states she was insulted about having a spinal cord stimulator. She states she was told that he would not do surgery and was told that she needs to go to physical therapy. She is in therapy x 4 weeks and it is not improving. She has tried putting it in a sling and this has not helped. She states she was told that she can't have an MRI and she doesn't feel comfortable seeing him. She states she had this spinal stimulator put in with Dr. Cedillo.     Medication Refill    Anxiety          Review of Systems       Objective     Vitals:    09/19/23 1126   BP: 124/82   Pulse: 62   Temp: 98.3 °F (36.8 °C)   TempSrc: Oral   SpO2: 95%   Weight: 101.6 kg (223 lb 15.8 oz)   Height: 5' 2" (1.575 m)       Physical Exam  Constitutional:       General: She is not in acute distress.     Appearance: She is well-developed. She is not diaphoretic.   HENT:      Head: Normocephalic and atraumatic.   Eyes:      Conjunctiva/sclera: Conjunctivae normal.   Neck:      Vascular: No carotid bruit.   Cardiovascular:      Rate and Rhythm: Normal rate and regular rhythm.      Heart sounds: Normal heart sounds. No murmur heard.     No friction rub. No gallop.   Pulmonary:      Effort: Pulmonary effort is normal. No respiratory distress.      Breath sounds: Normal breath sounds. No wheezing or rales.   Musculoskeletal:      Cervical back: Normal range of motion and neck supple.   Neurological:      Mental Status: She is alert and oriented to person, place, and time.            Assessment and Plan     1. Superior glenoid labrum lesion of right " shoulder, initial encounter  -     Ambulatory referral/consult to Orthopedics; Future; Expected date: 09/26/2023  Referrqal to ortho    2. Insomnia, unspecified type    3. Anxiety  -     ALPRAZolam (XANAX) 0.5 MG tablet; Take 1 tablet (0.5 mg total) by mouth 2 (two) times daily.  Dispense: 60 tablet; Refill: 5  Stable. Refilled meds.     4. Migraine without aura and without status migrainosus, not intractable  -     topiramate (TOPAMAX) 100 MG tablet; Take 1 tablet (100 mg total) by mouth once daily.  Dispense: 90 tablet; Refill: 1  Stable. Refilled meds.     5. Morbid obesity  -     CBC Auto Differential; Future; Expected date: 09/19/2023  -     Comprehensive Metabolic Panel; Future; Expected date: 09/19/2023  -     Lipid Panel; Future; Expected date: 09/19/2023                 No follow-ups on file.

## 2023-09-19 NOTE — TELEPHONE ENCOUNTER
No care due was identified.  [facility] Embedded Care Due Messages. Reference number: [id number].   9/19/2023 12:45:36 PM CDT

## 2023-09-19 NOTE — MEDICAL/APP STUDENT
Subjective     Patient ID: Sarah Mukherjee is a 43 y.o. female.    Chief Complaint: Medication Refill, Anxiety, and Insomnia    43 y.o female presents with right shoulder pain. She states that [facility] and was seen by Dr. Mendez and Dr. Vasquez. She has had steroid injections and PT for 4 weeks with no relief and worsening pain. Pt notes that her CT showed a slap tear and she was told that she needs surgery. She reports bring referred to Dr. Vasquez who she states made her upset because he criticized her for having a spinal cord stimulator. She states that he told her he can't do an MRI or surgery so he had her do PT. Pt does not feel comfortable seeing him again and is requesting a referral for a new orthopedist. She states that she's been sleeping with a sling but it doesn't help. She notes anxiety due to increased stress with her shoulder. Pt also hasn't been sleeping good with the sling and wakes up at night and can't fall back asleep.     Pt also needs a refill of her Topamax and Xanax.         Medication Refill  Associated symptoms include arthralgias. Pertinent negatives include no abdominal pain, chest pain, chills, congestion, coughing, fever, headaches, nausea, numbness, sore throat or vomiting.   Anxiety  Presents for follow-up visit. Symptoms include excessive worry, insomnia and nervous/anxious behavior. Patient reports no chest pain, depressed mood, dizziness, nausea, palpitations or shortness of breath. The quality of sleep is poor. Nighttime awakenings: early a.m. for rest of night.         Review of Systems   Constitutional:  Negative for chills and fever.   HENT:  Negative for nasal congestion, rhinorrhea and sore throat.    Eyes:  Negative for visual disturbance.   Respiratory:  Negative for cough, chest tightness and shortness of breath.    Cardiovascular:  Negative for chest pain and palpitations.   Gastrointestinal:  Negative for abdominal  pain, diarrhea, nausea and vomiting.   Genitourinary:  Negative for hematuria.   Musculoskeletal:  Positive for arthralgias.   Neurological:  Negative for dizziness, light-headedness, numbness and headaches.   Psychiatric/Behavioral:  Positive for sleep disturbance. The patient is nervous/anxious and has insomnia.           Objective     Physical Exam  Constitutional:       General: She is not in acute distress.     Appearance: She is not ill-appearing or toxic-appearing.   HENT:      Head: Normocephalic and atraumatic.   Cardiovascular:      Rate and Rhythm: Normal rate and regular rhythm.      Pulses: Normal pulses.      Heart sounds: Normal heart sounds. No murmur heard.     No friction rub. No gallop.   Pulmonary:      Effort: Pulmonary effort is normal. No respiratory distress.      Breath sounds: Normal breath sounds. No wheezing, rhonchi or rales.   Musculoskeletal:      Right shoulder: Tenderness present. Decreased range of motion.      Cervical back: Normal range of motion.   Skin:     Findings: No rash.   Neurological:      Mental Status: She is alert and oriented to person, place, and time.   Psychiatric:         Mood and Affect: Mood is anxious.            Assessment and Plan     1. Superior glenoid labrum lesion of right shoulder, initial encounter  -     Ambulatory referral/consult to Orthopedics; Future; Expected date: 09/26/2023  Referral to orthopedics.    2. Insomnia, unspecified type    3. Anxiety  -     ALPRAZolam (XANAX) 0.5 MG tablet; Take 1 tablet (0.5 mg total) by mouth 2 (two) times daily.  Dispense: 60 tablet; Refill: 5  Stable. Refilled meds.    4. Migraine without aura and without status migrainosus, not intractable  -     topiramate (TOPAMAX) 100 MG tablet; Take 1 tablet (100 mg total) by mouth once daily.  Dispense: 90 tablet; Refill: 1  Stable. Refilled meds.    5. Morbid obesity  -     CBC Auto Differential; Future; Expected date: 09/19/2023  -     Comprehensive Metabolic Panel;  Future; Expected date: 09/19/2023  -     Lipid Panel; Future; Expected date: 09/19/2023  Stable. Due for labs.       KENISHA Fonseca         Follow-up in 6 months.

## 2023-09-19 NOTE — TELEPHONE ENCOUNTER
----- Message from Susie Gutierrez sent at 9/19/2023 12:34 PM CDT -----  Regarding: Medication  Request            Reply in [facility]: NO      Please refill the medication listed below. Please call the patient   [phone] (D)       Medication      zolpidem (AMBIEN) 10 mg Tab       Preferred Pharmacy:  [facility] [address] [place] [address] [address]   Phone: [phone]  Fax:  [phone]

## 2023-09-20 ENCOUNTER — TELEPHONE (OUTPATIENT)
Dept: FAMILY MEDICINE | Facility: CLINIC | Age: 43
End: 2023-09-20
Payer: MEDICARE

## 2023-09-20 RX ORDER — ZOLPIDEM TARTRATE 10 MG/1
10 TABLET ORAL NIGHTLY
Qty: 30 TABLET | Refills: 0 | OUTPATIENT
Start: 2023-09-20

## 2023-09-20 RX ORDER — ZOLPIDEM TARTRATE 10 MG/1
10 TABLET ORAL NIGHTLY
Qty: 30 TABLET | Refills: 5 | Status: SHIPPED | OUTPATIENT
Start: 2023-09-20 | End: 2024-03-10 | Stop reason: SDUPTHER

## 2023-09-20 NOTE — TELEPHONE ENCOUNTER
----- Message from Jose Booker sent at 9/20/2023  9:32 AM CDT -----  Type: RX Refill Request    Who Called: Self     Have you contacted your pharmacy:Yes     Refill or New Rx:REFILL     RX Name and Strength:zolpidem (AMBIEN) 10 mg Tab 30 tablet     Preferred Pharmacy with phone number:[id number] [address] [address]   Phone:  [phone]  Fax:  [phone]    Local or Mail Order:Local     Would the patient rather a call back or a response via [facility]? CALL     Best Call Back Number: [phone] (home)

## 2023-11-10 DIAGNOSIS — G62.9 NEUROPATHY: ICD-10-CM

## 2023-11-10 RX ORDER — GABAPENTIN 300 MG/1
300 CAPSULE ORAL 3 TIMES DAILY
Qty: 90 CAPSULE | Refills: 0 | Status: SHIPPED | OUTPATIENT
Start: 2023-11-10 | End: 2023-12-01

## 2023-11-10 RX ORDER — GABAPENTIN 300 MG/1
300 CAPSULE ORAL 3 TIMES DAILY
Qty: 90 CAPSULE | Refills: 0 | OUTPATIENT
Start: 2023-11-10

## 2023-11-10 NOTE — TELEPHONE ENCOUNTER
No care due was identified.  [facility] Embedded Care Due Messages. Reference number: [id number].   11/10/2023 10:08:57 AM CST

## 2023-11-10 NOTE — TELEPHONE ENCOUNTER
No care due was identified.  Health [facility] Embedded Care Due Messages. Reference number: [id number].   11/10/2023 7:23:42 AM CST

## 2023-11-30 DIAGNOSIS — G62.9 NEUROPATHY: ICD-10-CM

## 2023-11-30 NOTE — TELEPHONE ENCOUNTER
No care due was identified.  Health [facility] Embedded Care Due Messages. Reference number: [id number].   11/30/2023 9:18:31 AM CST

## 2023-12-01 RX ORDER — GABAPENTIN 300 MG/1
300 CAPSULE ORAL 3 TIMES DAILY
Qty: 90 CAPSULE | Refills: 0 | Status: SHIPPED | OUTPATIENT
Start: 2023-12-01 | End: 2024-01-10

## 2023-12-11 ENCOUNTER — PATIENT MESSAGE (OUTPATIENT)
Dept: FAMILY MEDICINE | Facility: CLINIC | Age: 43
End: 2023-12-11
Payer: MEDICARE

## 2023-12-26 ENCOUNTER — OFFICE VISIT (OUTPATIENT)
Dept: URGENT CARE | Facility: CLINIC | Age: 43
End: 2023-12-26
Payer: MEDICARE

## 2023-12-26 VITALS
HEIGHT: 62 IN | RESPIRATION RATE: 20 BRPM | SYSTOLIC BLOOD PRESSURE: 125 MMHG | BODY MASS INDEX: 41.04 KG/M2 | WEIGHT: 223 LBS | OXYGEN SATURATION: 97 % | HEART RATE: 102 BPM | DIASTOLIC BLOOD PRESSURE: 78 MMHG | TEMPERATURE: 100 F

## 2023-12-26 DIAGNOSIS — R05.9 COUGH, UNSPECIFIED TYPE: ICD-10-CM

## 2023-12-26 DIAGNOSIS — J10.1 INFLUENZA A: Primary | ICD-10-CM

## 2023-12-26 LAB
CTP QC/QA: YES
POC MOLECULAR INFLUENZA A AGN: POSITIVE
POC MOLECULAR INFLUENZA B AGN: NEGATIVE

## 2023-12-26 PROCEDURE — 87502 INFLUENZA DNA AMP PROBE: CPT | Mod: QW,S$GLB,, | Performed by: NURSE PRACTITIONER

## 2023-12-26 PROCEDURE — 87502 POCT INFLUENZA A/B MOLECULAR: ICD-10-PCS | Mod: QW,S$GLB,, | Performed by: NURSE PRACTITIONER

## 2023-12-26 PROCEDURE — 99203 PR OFFICE/OUTPT VISIT, NEW, LEVL III, 30-44 MIN: ICD-10-PCS | Mod: S$GLB,,, | Performed by: NURSE PRACTITIONER

## 2023-12-26 PROCEDURE — 99203 OFFICE O/P NEW LOW 30 MIN: CPT | Mod: S$GLB,,, | Performed by: NURSE PRACTITIONER

## 2023-12-26 RX ORDER — OSELTAMIVIR PHOSPHATE 75 MG/1
75 CAPSULE ORAL 2 TIMES DAILY
Qty: 10 CAPSULE | Refills: 0 | Status: SHIPPED | OUTPATIENT
Start: 2023-12-26 | End: 2023-12-31

## 2023-12-26 RX ORDER — BENZONATATE 100 MG/1
100 CAPSULE ORAL 3 TIMES DAILY PRN
Qty: 30 CAPSULE | Refills: 0 | Status: SHIPPED | OUTPATIENT
Start: 2023-12-26 | End: 2024-01-05

## 2023-12-26 NOTE — PROGRESS NOTES
"Subjective:      Patient ID: Sarah Mukherjee is a 43 y.o. female.    Vitals:  height is 5' 2" (1.575 m) and weight is 101.2 kg (223 lb). Her oral temperature is 99.5 °F (37.5 °C). Her blood pressure is 125/78 and her pulse is 102. Her respiration is 20 and oxygen saturation is 97%.     Chief Complaint: Cough    43-year-old female presents with a complaint of chills, postnasal drip, nonproductive, and headaches.  Reports onset of symptoms, 2 days ago following a cruise.  She reports a negative home COVID test.  She reports a history of a low-grade temp.      Cough  This is a new problem. Episode onset: 2 days ago. The problem has been gradually worsening. The problem occurs constantly. The cough is Non-productive. Associated symptoms include chills, headaches, nasal congestion and postnasal drip. Pertinent negatives include no chest pain, ear congestion, ear pain, fever, heartburn, hemoptysis, myalgias, rash, rhinorrhea, sore throat, shortness of breath, sweats, weight loss or wheezing. Nothing aggravates the symptoms. Risk factors for lung disease include travel. Treatments tried: dayquil. The treatment provided no relief. There is no history of asthma, bronchiectasis, bronchitis, COPD, emphysema, environmental allergies or pneumonia.       Constitution: Positive for chills. Negative for fatigue and fever.   HENT:  Positive for congestion and postnasal drip. Negative for ear pain, sinus pain, sinus pressure and sore throat.    Cardiovascular:  Negative for chest pain and palpitations.   Respiratory:  Positive for cough. Negative for chest tightness, sputum production, bloody sputum, COPD, shortness of breath, stridor, wheezing and asthma.    Gastrointestinal:  Negative for nausea, vomiting and heartburn.   Musculoskeletal:  Negative for muscle ache.   Skin:  Negative for rash.   Allergic/Immunologic: Negative for environmental allergies and asthma.   Neurological:  Positive for headaches. Negative for history of " migraines.      Objective:     Physical Exam   Constitutional: She is oriented to person, place, and time. She appears well-developed. She is cooperative.  Non-toxic appearance. She does not appear ill. No distress.   HENT:   Head: Normocephalic and atraumatic.   Ears:   Right Ear: Hearing, tympanic membrane, external ear and ear canal normal. impacted cerumen  Left Ear: Hearing, tympanic membrane, external ear and ear canal normal. impacted cerumen  Nose: Rhinorrhea and congestion present. No mucosal edema or nasal deformity. No epistaxis. Right sinus exhibits no maxillary sinus tenderness and no frontal sinus tenderness. Left sinus exhibits no maxillary sinus tenderness and no frontal sinus tenderness.   Mouth/Throat: Uvula is midline, oropharynx is clear and moist and mucous membranes are normal. No trismus in the jaw. Normal dentition. No uvula swelling. No oropharyngeal exudate, posterior oropharyngeal edema or posterior oropharyngeal erythema.   Eyes: Conjunctivae and lids are normal. No scleral icterus.   Neck: Trachea normal and phonation normal. Neck supple. No edema present. No erythema present. No neck rigidity present.   Cardiovascular: Normal rate, regular rhythm, normal heart sounds and normal pulses.   Pulmonary/Chest: Effort normal and breath sounds normal. No respiratory distress. She has no decreased breath sounds. She has no rhonchi.   Abdominal: Normal appearance.   Musculoskeletal: Normal range of motion.         General: No deformity. Normal range of motion.   Neurological: She is alert and oriented to person, place, and time. She exhibits normal muscle tone. Coordination normal.   Skin: Skin is warm, dry, intact, not diaphoretic and not pale.   Psychiatric: Her speech is normal and behavior is normal. Judgment and thought content normal.   Nursing note and vitals reviewed.      Assessment:     1. Influenza A    2. Cough, unspecified type      Results for orders placed or performed in visit on  12/26/23   POCT Influenza A/B Molecular   Result Value Ref Range    POC Molecular Influenza A Ag Positive (A) Negative, Not Reported    POC Molecular Influenza B Ag Negative Negative, Not Reported     Acceptable Yes       Plan:   Influenza A  -     oseltamivir (TAMIFLU) 75 MG capsule; Take 1 capsule (75 mg total) by mouth 2 (two) times daily. for 5 days  Dispense: 10 capsule; Refill: 0    Cough, unspecified type  -     POCT Influenza A/B Molecular  -     benzonatate (TESSALON) 100 MG capsule; Take 1 capsule (100 mg total) by mouth 3 (three) times daily as needed for Cough.  Dispense: 30 capsule; Refill: 0          FLU  Your Rapid FLU test was POSITIVE FOR INFLUENZA A  If your condition worsens or fails to improve we recommend that you receive another evaluation at the ER immediately or contact your PCP to discuss your concerns or return here. You must understand that you've received an urgent care treatment only and that you may be released before all your medical problems are known or treated. You the patient will arrange for follouwp care as instructed.   -  Take full course of Tamilfu as prescribed  -  Flonase (fluticasone) is a nasal spray which is available over the counter and may help with your symptoms.   -  Zyrtec D, Claritin D or Allegra D can also help with symptoms of congestion and drainage.   -  If you just have drainage you can take plain Zyrtec, Claritin or Allegra   -  Rest and fluids are also important.   -  Tylenol or ibuprofen can also be used as directed for pain unless you have an allergy to them or medical condition such as stomach ulcers, kidney or liver disease or blood thinners etc for which you should not be taking these type of medications.   - Do not share any utensils or share drinks   - Wash hands frequently          Additional MDM:     Heart Failure Score:   COPD = No

## 2023-12-26 NOTE — PATIENT INSTRUCTIONS
FLU  Your Rapid FLU test was POSITIVE FOR INFLUENZA A  If your condition worsens or fails to improve we recommend that you receive another evaluation at the ER immediately or contact your PCP to discuss your concerns or return here. You must understand that you've received an urgent care treatment only and that you may be released before all your medical problems are known or treated. You the patient will arrange for follouwp care as instructed.   -  Take full course of Tamilfu as prescribed  -  Flonase (fluticasone) is a nasal spray which is available over the counter and may help with your symptoms.   -  Zyrtec D, Claritin D or Allegra D can also help with symptoms of congestion and drainage.   -  If you just have drainage you can take plain Zyrtec, Claritin or Allegra   -  Rest and fluids are also important.   -  Tylenol or ibuprofen can also be used as directed for pain unless you have an allergy to them or medical condition such as stomach ulcers, kidney or liver disease or blood thinners etc for which you should not be taking these type of medications.   - Do not share any utensils or share drinks   - Wash hands frequently     You must understand that you've received an Urgent Care treatment only and that you may be released before all your medical problems are known or treated. You, the patient, will arrange for follow up care as instructed.  Follow up with your PCP or specialty clinic as directed in the next 1-2 weeks if not improved or as needed.  You can call [phone] to schedule an appointment with the appropriate provider.  If your condition worsens we recommend that you receive another evaluation at the emergency room immediately or contact your primary medical clinics after hours call service to discuss your concerns.  Please return here or go to the Emergency Department for any concerns or worsening of condition.    If you were prescribed a narcotic or controlled medication, do not drive or  operate heavy equipment or machinery while taking these medications.    Thank you for choosing [facility]!    Our goal in the Urgent Care is to always provide outstanding medical care. You may receive a survey by mail or e-mail in the next week regarding your experience today. We would greatly appreciate you completing and returning the survey. Your feedback provides us with a way to recognize our staff who provide very good care, and it helps us learn how to improve when your experience was below our aspiration of excellence.      We appreciate you trusting us with your medical care. We hope you feel better soon. We will be happy to take care of you for all of your future medical needs.   This note was prepared using voice-recognition software.  Although efforts are made to proofread the note, some errors may persist in the final document.     Sincerely,    Raudel Valadez DNP, FNP-C

## 2024-01-10 DIAGNOSIS — G62.9 NEUROPATHY: ICD-10-CM

## 2024-01-10 RX ORDER — GABAPENTIN 300 MG/1
300 CAPSULE ORAL 3 TIMES DAILY
Qty: 90 CAPSULE | Refills: 0 | Status: SHIPPED | OUTPATIENT
Start: 2024-01-10 | End: 2024-02-21 | Stop reason: SDUPTHER

## 2024-01-10 NOTE — TELEPHONE ENCOUNTER
No care due was identified.  Health [facility] Embedded Care Due Messages. Reference number: [id number].   1/10/2024 8:05:03 AM CST

## 2024-01-18 ENCOUNTER — OFFICE VISIT (OUTPATIENT)
Dept: FAMILY MEDICINE | Facility: CLINIC | Age: 44
End: 2024-01-18
Payer: MEDICARE

## 2024-01-18 VITALS
HEART RATE: 60 BPM | WEIGHT: 224.88 LBS | DIASTOLIC BLOOD PRESSURE: 84 MMHG | OXYGEN SATURATION: 96 % | SYSTOLIC BLOOD PRESSURE: 120 MMHG | BODY MASS INDEX: 41.38 KG/M2 | RESPIRATION RATE: 18 BRPM | HEIGHT: 62 IN | TEMPERATURE: 98 F

## 2024-01-18 DIAGNOSIS — F39 MOOD DISORDER: ICD-10-CM

## 2024-01-18 DIAGNOSIS — N64.4 BREAST TENDERNESS IN FEMALE: ICD-10-CM

## 2024-01-18 DIAGNOSIS — M46.1 SACROILIITIS, NOT ELSEWHERE CLASSIFIED: ICD-10-CM

## 2024-01-18 DIAGNOSIS — E66.01 MORBID OBESITY: Primary | ICD-10-CM

## 2024-01-18 PROCEDURE — 99213 OFFICE O/P EST LOW 20 MIN: CPT | Mod: S$PBB,,, | Performed by: FAMILY MEDICINE

## 2024-01-18 PROCEDURE — 99999 PR PBB SHADOW E&M-EST. PATIENT-LVL III: CPT | Mod: PBBFAC,,, | Performed by: FAMILY MEDICINE

## 2024-01-18 PROCEDURE — 99213 OFFICE O/P EST LOW 20 MIN: CPT | Mod: PBBFAC,PO | Performed by: FAMILY MEDICINE

## 2024-01-18 NOTE — PROGRESS NOTES
Subjective     Patient ID: Sarah Mukherjee is a 43 y.o. female.    Chief Complaint: Breast Pain (Right breast), Breast Mass (right), and Medication Refill    43 year old female presents for right breast pain. She states she had an abscess that drained. She states it has a scab now. She has pain under her armpit and going into her breast. It is improving. She has no ofever or chills. She is s/p shoulder tendinitis.       Past Medical History:   Diagnosis Date    Cancer     skin cancer removed    Crohn's disease     Degenerative joint disease of spine     Esophagitis     History of stomach ulcers     Lumbar facet arthropathy 6/20/2014    Neurogenic bladder       Past Surgical History:   Procedure Laterality Date    BREAST RECONSTRUCTION      CHOLECYSTECTOMY      HYSTERECTOMY      total hysterectomy in 20s due to fibroid, endometriosis    INJECTION OF ANESTHETIC AGENT AROUND NERVE Left 01/04/2019    Procedure: Block, Nerve MEDIAL BRANCH BLOCKS LEFT LUMBAR L5-S3;  Surgeon: Andra Burt MD;  Location: [facility];  Service: Pain Management;  Laterality: Left;  1 OF 2    INJECTION OF JOINT Left 06/28/2019    Procedure: INJECTION, JOINT COCCYX;  Surgeon: Andra Burt MD;  Location: [facility];  Service: Pain Management;  Laterality: Left;  INJECT JOINt & LEFT SIDE    PELVIC LAPAROSCOPY      TONSILLECTOMY       Family History   Problem Relation Age of Onset    No Known Problems Father     Diabetes Mother     Hypertension Mother     Arthritis Mother     Breast cancer Maternal Aunt 47    Ovarian cancer Maternal Aunt     Diabetes Maternal Grandmother     Heart disease Maternal Grandmother     Heart disease Paternal Grandfather     Heart disease Paternal Grandmother     Cancer Maternal Uncle      Social History     Socioeconomic History    Marital status:    Tobacco Use    Smoking status: Never     Passive exposure: Never    Smokeless tobacco: Never   Substance and Sexual Activity    Alcohol use: Not  "Currently     Comment: occ    Drug use: No    Sexual activity: Yes     Partners: Male     Birth control/protection: None     Comment:  for 19 years to the same man     Social Determinants of Health     Financial Resource Strain: Low Risk  (12/26/2023)    Overall Financial Resource Strain (CARDIA)     Difficulty of Paying Living Expenses: Not hard at all   Food Insecurity: No Food Insecurity (12/26/2023)    Hunger Vital Sign     Worried About Running Out of Food in the Last Year: Never true     Ran Out of Food in the Last Year: Never true   Transportation Needs: No Transportation Needs (12/26/2023)    PRAPARE - Transportation     Lack of Transportation (Medical): No     Lack of Transportation (Non-Medical): No   Physical Activity: Insufficiently Active (12/26/2023)    Exercise Vital Sign     Days of Exercise per Week: 2 days     Minutes of Exercise per Session: 20 min   Stress: No Stress Concern Present (12/26/2023)    [employer] - Occupational Stress Questionnaire     Feeling of Stress : Not at all   Social Connections: Unknown (12/26/2023)    Social Connection and Isolation Panel [NHANES]     Frequency of Communication with Friends and Family: More than three times a week     Frequency of Social Gatherings with Friends and Family: Twice a week     Active Member of Clubs or Organizations: Yes     Attends Club or Organization Meetings: More than 4 times per year     Marital Status:    Housing Stability: Low Risk  (12/26/2023)    Housing Stability Vital Sign     Unable to Pay for Housing in the Last Year: No     Number of Places Lived in the Last Year: 1     Unstable Housing in the Last Year: No       Review of Systems       Objective   Vitals:    01/18/24 0930   BP: 120/84   Pulse: 60   Resp: 18   Temp: 98.1 °F (36.7 °C)   TempSrc: Oral   SpO2: 96%   Weight: 102 kg (224 lb 13.9 oz)   Height: 5' 2" (1.575 m)       Physical Exam  Constitutional:       Appearance: Normal " appearance.   Chest:   Breasts:     Right: No swelling, bleeding, inverted nipple, mass, nipple discharge, skin change or tenderness.          Comments: Eschar along the areola  No swelling or masses noted  Neurological:      Mental Status: She is alert.            Assessment and Plan     1. Morbid obesity  Stable    2. Sacroiliitis, not elsewhere classified    3. Mood disorder  Stable.   4. Breast tenderness in female  Breast feels fine               No follow-ups on file.

## 2024-02-12 DIAGNOSIS — G62.9 NEUROPATHY: ICD-10-CM

## 2024-02-12 RX ORDER — GABAPENTIN 300 MG/1
300 CAPSULE ORAL 3 TIMES DAILY
Qty: 90 CAPSULE | Refills: 0 | OUTPATIENT
Start: 2024-02-12

## 2024-02-12 NOTE — TELEPHONE ENCOUNTER
No care due was identified.  Health [facility] Embedded Care Due Messages. Reference number: [id number].   2/12/2024 12:36:09 PM CST

## 2024-02-21 DIAGNOSIS — G62.9 NEUROPATHY: ICD-10-CM

## 2024-02-21 RX ORDER — GABAPENTIN 300 MG/1
300 CAPSULE ORAL 3 TIMES DAILY
Qty: 90 CAPSULE | Refills: 0 | Status: SHIPPED | OUTPATIENT
Start: 2024-02-21 | End: 2024-04-19

## 2024-02-21 NOTE — TELEPHONE ENCOUNTER
No care due was identified.  Health [facility] Embedded Care Due Messages. Reference number: [id number].   2/21/2024 9:54:17 AM CST

## 2024-02-21 NOTE — TELEPHONE ENCOUNTER
Last Office Visit Info:   The patient's last visit with Staci Sorenson MD was on 1/18/2024.    The patient's last visit in current department was on 1/18/2024.

## 2024-03-08 DIAGNOSIS — R03.0 ELEVATED BLOOD PRESSURE READING: ICD-10-CM

## 2024-03-08 RX ORDER — ATENOLOL 25 MG/1
25 TABLET ORAL
Qty: 90 TABLET | Refills: 3 | Status: SHIPPED | OUTPATIENT
Start: 2024-03-08 | End: 2024-03-10 | Stop reason: SDUPTHER

## 2024-03-08 NOTE — TELEPHONE ENCOUNTER
Refill Decision Note   Sarah Mukherjee  is requesting a refill authorization.  Brief Assessment and Rationale for Refill:  Approve     Medication Therapy Plan:         Comments:     Note composed:8:52 AM 03/08/2024

## 2024-03-08 NOTE — TELEPHONE ENCOUNTER
No care due was identified.  Health [facility] Embedded Care Due Messages. Reference number: [id number].   3/08/2024 7:03:49 AM CST

## 2024-03-10 DIAGNOSIS — G43.009 MIGRAINE WITHOUT AURA AND WITHOUT STATUS MIGRAINOSUS, NOT INTRACTABLE: ICD-10-CM

## 2024-03-10 DIAGNOSIS — G47.00 INSOMNIA, UNSPECIFIED TYPE: ICD-10-CM

## 2024-03-10 DIAGNOSIS — F41.9 ANXIETY: ICD-10-CM

## 2024-03-10 DIAGNOSIS — R03.0 ELEVATED BLOOD PRESSURE READING: ICD-10-CM

## 2024-03-10 NOTE — TELEPHONE ENCOUNTER
No care due was identified.  [facility] Embedded Care Due Messages. Reference number: [id number].   3/10/2024 11:33:11 AM CDT

## 2024-03-12 RX ORDER — TOPIRAMATE 100 MG/1
100 TABLET, FILM COATED ORAL DAILY
Qty: 90 TABLET | Refills: 1 | Status: SHIPPED | OUTPATIENT
Start: 2024-03-12 | End: 2024-03-14 | Stop reason: SDUPTHER

## 2024-03-12 RX ORDER — ATENOLOL 25 MG/1
25 TABLET ORAL DAILY
Qty: 90 TABLET | Refills: 1 | Status: SHIPPED | OUTPATIENT
Start: 2024-03-12

## 2024-03-12 RX ORDER — ZOLPIDEM TARTRATE 10 MG/1
10 TABLET ORAL NIGHTLY
Qty: 30 TABLET | Refills: 5 | Status: SHIPPED | OUTPATIENT
Start: 2024-03-12 | End: 2024-03-13 | Stop reason: SDUPTHER

## 2024-03-12 RX ORDER — ALPRAZOLAM 0.5 MG/1
0.5 TABLET ORAL 2 TIMES DAILY
Qty: 60 TABLET | Refills: 5 | Status: SHIPPED | OUTPATIENT
Start: 2024-03-12

## 2024-03-13 DIAGNOSIS — G47.00 INSOMNIA, UNSPECIFIED TYPE: ICD-10-CM

## 2024-03-13 NOTE — TELEPHONE ENCOUNTER
No care due was identified.  Health [facility] Embedded Care Due Messages. Reference number: [id number].   3/13/2024 4:53:09 PM CDT

## 2024-03-14 ENCOUNTER — TELEPHONE (OUTPATIENT)
Dept: FAMILY MEDICINE | Facility: CLINIC | Age: 44
End: 2024-03-14
Payer: MEDICARE

## 2024-03-14 DIAGNOSIS — G43.009 MIGRAINE WITHOUT AURA AND WITHOUT STATUS MIGRAINOSUS, NOT INTRACTABLE: ICD-10-CM

## 2024-03-14 NOTE — TELEPHONE ENCOUNTER
----- Message from Lucy Durbin sent at 3/14/2024  9:28 AM CDT -----  Regarding: Self [phone]  Type: RX Refill Request     Who Called: Self     Have you contacted your pharmacy: Yes, pt states the pharmacy informed her only one (atenoloL (TENORMIN) 25 MG tablet) RX was received from the Dr.     Refill or New Rx: Refill      RX Name and Strength: ALPRAZolam (XANAX) 0.5 MG tablet, topiramate (TOPAMAX) 100 MG tablet, zolpidem (AMBIEN) 10 mg Tab,       How is the patient currently taking it? (ex. 1XDay):     Is this a 30 day or 90 day RX:     Preferred Pharmacy with phone number:   [id number] - [place], [place] - [address] [facility]  [address] [address] [address]  Phone: [phone] Fax: [phone]      Local or Mail Order: Local     Ordering Provider:     Would the patient rather a call back or a response via [facility]?     Best Call Back Number: [phone]       Additional Information: pt states she needs RX for the meds.

## 2024-03-15 RX ORDER — TOPIRAMATE 100 MG/1
100 TABLET, FILM COATED ORAL DAILY
Qty: 90 TABLET | Refills: 1 | Status: SHIPPED | OUTPATIENT
Start: 2024-03-15

## 2024-03-18 ENCOUNTER — LAB VISIT (OUTPATIENT)
Dept: LAB | Facility: HOSPITAL | Age: 44
End: 2024-03-18
Attending: INTERNAL MEDICINE
Payer: MEDICARE

## 2024-03-18 ENCOUNTER — OFFICE VISIT (OUTPATIENT)
Dept: GASTROENTEROLOGY | Facility: CLINIC | Age: 44
End: 2024-03-18
Payer: MEDICARE

## 2024-03-18 VITALS
HEIGHT: 62 IN | SYSTOLIC BLOOD PRESSURE: 140 MMHG | HEART RATE: 71 BPM | WEIGHT: 222.88 LBS | BODY MASS INDEX: 41.02 KG/M2 | DIASTOLIC BLOOD PRESSURE: 92 MMHG

## 2024-03-18 DIAGNOSIS — K58.0 IRRITABLE BOWEL SYNDROME WITH DIARRHEA: ICD-10-CM

## 2024-03-18 DIAGNOSIS — K58.0 IRRITABLE BOWEL SYNDROME WITH DIARRHEA: Primary | ICD-10-CM

## 2024-03-18 LAB
ALBUMIN SERPL BCP-MCNC: 3.9 G/DL (ref 3.5–5.2)
ALP SERPL-CCNC: 73 U/L (ref 55–135)
ALT SERPL W/O P-5'-P-CCNC: 11 U/L (ref 10–44)
ANION GAP SERPL CALC-SCNC: 8 MMOL/L (ref 8–16)
AST SERPL-CCNC: 12 U/L (ref 10–40)
BASOPHILS # BLD AUTO: 0.05 K/UL (ref 0–0.2)
BASOPHILS NFR BLD: 0.5 % (ref 0–1.9)
BILIRUB SERPL-MCNC: 0.4 MG/DL (ref 0.1–1)
BUN SERPL-MCNC: 12 MG/DL (ref 6–20)
CALCIUM SERPL-MCNC: 9.7 MG/DL (ref 8.7–10.5)
CHLORIDE SERPL-SCNC: 107 MMOL/L (ref 95–110)
CO2 SERPL-SCNC: 23 MMOL/L (ref 23–29)
CREAT SERPL-MCNC: 0.9 MG/DL (ref 0.5–1.4)
CRP SERPL-MCNC: 2.4 MG/L (ref 0–8.2)
DIFFERENTIAL METHOD BLD: ABNORMAL
EOSINOPHIL # BLD AUTO: 0.2 K/UL (ref 0–0.5)
EOSINOPHIL NFR BLD: 1.7 % (ref 0–8)
ERYTHROCYTE [DISTWIDTH] IN BLOOD BY AUTOMATED COUNT: 13 % (ref 11.5–14.5)
EST. GFR  (NO RACE VARIABLE): >60 ML/MIN/1.73 M^2
GLUCOSE SERPL-MCNC: 133 MG/DL (ref 70–110)
HCT VFR BLD AUTO: 43.8 % (ref 37–48.5)
HGB BLD-MCNC: 14.3 G/DL (ref 12–16)
IMM GRANULOCYTES # BLD AUTO: 0.03 K/UL (ref 0–0.04)
IMM GRANULOCYTES NFR BLD AUTO: 0.3 % (ref 0–0.5)
LYMPHOCYTES # BLD AUTO: 3.1 K/UL (ref 1–4.8)
LYMPHOCYTES NFR BLD: 32.8 % (ref 18–48)
MCH RBC QN AUTO: 26.1 PG (ref 27–31)
MCHC RBC AUTO-ENTMCNC: 32.6 G/DL (ref 32–36)
MCV RBC AUTO: 80 FL (ref 82–98)
MONOCYTES # BLD AUTO: 0.5 K/UL (ref 0.3–1)
MONOCYTES NFR BLD: 5.7 % (ref 4–15)
NEUTROPHILS # BLD AUTO: 5.6 K/UL (ref 1.8–7.7)
NEUTROPHILS NFR BLD: 59 % (ref 38–73)
NRBC BLD-RTO: 0 /100 WBC
PLATELET # BLD AUTO: 253 K/UL (ref 150–450)
PMV BLD AUTO: 10.4 FL (ref 9.2–12.9)
POTASSIUM SERPL-SCNC: 3.6 MMOL/L (ref 3.5–5.1)
PROT SERPL-MCNC: 7.1 G/DL (ref 6–8.4)
RBC # BLD AUTO: 5.47 M/UL (ref 4–5.4)
SODIUM SERPL-SCNC: 138 MMOL/L (ref 136–145)
WBC # BLD AUTO: 9.45 K/UL (ref 3.9–12.7)

## 2024-03-18 PROCEDURE — 85025 COMPLETE CBC W/AUTO DIFF WBC: CPT | Performed by: INTERNAL MEDICINE

## 2024-03-18 PROCEDURE — 36415 COLL VENOUS BLD VENIPUNCTURE: CPT | Performed by: INTERNAL MEDICINE

## 2024-03-18 PROCEDURE — 99204 OFFICE O/P NEW MOD 45 MIN: CPT | Mod: S$PBB,,, | Performed by: INTERNAL MEDICINE

## 2024-03-18 PROCEDURE — 80053 COMPREHEN METABOLIC PANEL: CPT | Performed by: INTERNAL MEDICINE

## 2024-03-18 PROCEDURE — 86140 C-REACTIVE PROTEIN: CPT | Performed by: INTERNAL MEDICINE

## 2024-03-18 PROCEDURE — 99999 PR PBB SHADOW E&M-EST. PATIENT-LVL III: CPT | Mod: PBBFAC,,, | Performed by: INTERNAL MEDICINE

## 2024-03-18 PROCEDURE — 99213 OFFICE O/P EST LOW 20 MIN: CPT | Mod: PBBFAC | Performed by: INTERNAL MEDICINE

## 2024-03-18 RX ORDER — DICYCLOMINE HYDROCHLORIDE 10 MG/1
10 CAPSULE ORAL
Qty: 90 CAPSULE | Refills: 3 | Status: SHIPPED | OUTPATIENT
Start: 2024-03-18 | End: 2024-04-17

## 2024-03-18 NOTE — PROGRESS NOTES
"GENERAL GI PATIENT INTAKE:    COVID symptoms in the last 7 days (runny nose, sore throat, congestion, cough, fever): No  PCP: Staci Sorenson  If not PCP-  number given to establish [phone]: N/A    ALLERGIES REVIEWED:  Yes    CHIEF COMPLAINT:    Chief Complaint   Patient presents with    GI Problem     IBS-D       VITAL SIGNS:  BP (!) 140/92   Pulse 71   Ht 5' 2" (1.575 m)   Wt 101.1 kg (222 lb 14.2 oz)   BMI 40.77 kg/m²      Change in medical, surgical, family or social history: No      REVIEWED MEDICATION LIST RECONCILED INCLUDING ABOVE MEDS:  Yes     "

## 2024-03-18 NOTE — PROGRESS NOTES
Subjective:       Patient ID: Sarah Mukherjee is a 44 y.o. female.    Chief Complaint: GI Problem (IBS-D)    GI Problem    44-year-old woman.  New patient visit.  Prior GI care has been with [facility].  Her doctor retired.  Last colonoscopy with them was in 2019 that was normal.  Last visit she thinks was about 2022.    She comes in today because for the last 2 months she has been having abdominal pain this is generalized lower abdominal cramping pain.  Associated with an urgent bowel movement.  And her bowel movements during all this time I have been loose.  So she will eat typically and then have very quickly the lower abdominal pain followed by urgent diarrhea.  Multiple loose stools throughout the day.  It is possible this is similar to what she has had in the past.  She has not sure she was diagnosed with IBS in the past.  She does know that she was given a prescription for Lomotil since she has an old prescription at home no blood in the stool.  No nausea vomiting.    Review of systems  Recent shoulder surgery     Past surgical history  Remote cholecystectomy    Social history  No travel   No antibiotics      Past Medical History:   Diagnosis Date    Cancer     skin cancer removed    Crohn's disease     Degenerative joint disease of spine     Esophagitis     History of stomach ulcers     Lumbar facet arthropathy 6/20/2014    Neurogenic bladder        Review of patient's allergies indicates:   Allergen Reactions    Penicillins Hives    Fioricet [butalbital-acetaminophen-caff]      palpitations        Family History   Problem Relation Age of Onset    No Known Problems Father     Diabetes Mother     Hypertension Mother     Arthritis Mother     Breast cancer Maternal Aunt 47    Ovarian cancer Maternal Aunt     Diabetes Maternal Grandmother     Heart disease Maternal Grandmother     Heart disease Paternal Grandfather     Heart disease Paternal Grandmother     Cancer Maternal Uncle        Social History     Tobacco  Use    Smoking status: Never     Passive exposure: Never    Smokeless tobacco: Never   Substance Use Topics    Alcohol use: Not Currently     Comment: occ    Drug use: No        Review of Systems        No results found for this or any previous visit from the past 365 days.             Objective:      Physical Exam  Exam conducted with a chaperone present.   Abdominal:      General: Abdomen is flat. Bowel sounds are normal. There is no distension. There are no signs of injury.      Palpations: Abdomen is soft. There is no shifting dullness, hepatomegaly or mass.      Tenderness: There is no abdominal tenderness.         Assessment & Plan:       Irritable bowel syndrome with diarrhea  -     CBC Auto Differential; Future; Expected date: 03/18/2024  -     Comprehensive Metabolic Panel; Future; Expected date: 03/18/2024  -     C-Reactive Protein; Future; Expected date: 03/18/2024  -     Stool Exam-Ova,Cysts,Parasites; Future; Expected date: 03/18/2024  -     Giardia / Cryptosporidum, EIA; Future; Expected date: 03/18/2024  -     Calprotectin, Stool; Future; Expected date: 03/18/2024     Assessment.  Several month history of abdominal pain associated with diarrhea.  I would like to see her old records from [facility] as far as whether this is similar to what she has had in the past.  I think it is most likely IBS diarrhea predominant.  And I would like to start her on Bentyl to be taken before meals.  Because of the new onset of want to rule out infectious cause like Giardia or Cryptosporidium.  I do not think a bacterial colitis is going to cause symptoms lasting this long and there be no increased risk for her having C difficile.  I think it be very likely to be new onset of IBD.  She does report some NSAID usage, particularly for the shoulder pain I asked her to get back in touch with me as she tries the Bentyl and if it does not work and his symptoms evolve we may consider further workup which may or may not include  cross-sectional imaging or endoscopic procedures    This note was created with voice recognition dictation technology.  There may be errors that I did not see, detect or correct.      Ajit Diaz MD

## 2024-03-19 ENCOUNTER — PATIENT MESSAGE (OUTPATIENT)
Dept: GASTROENTEROLOGY | Facility: CLINIC | Age: 44
End: 2024-03-19
Payer: MEDICARE

## 2024-03-20 ENCOUNTER — PATIENT MESSAGE (OUTPATIENT)
Dept: GASTROENTEROLOGY | Facility: CLINIC | Age: 44
End: 2024-03-20
Payer: MEDICARE

## 2024-03-21 ENCOUNTER — PATIENT MESSAGE (OUTPATIENT)
Dept: GASTROENTEROLOGY | Facility: CLINIC | Age: 44
End: 2024-03-21
Payer: MEDICARE

## 2024-03-21 RX ORDER — ZOLPIDEM TARTRATE 10 MG/1
10 TABLET ORAL NIGHTLY
Qty: 30 TABLET | Refills: 5 | Status: SHIPPED | OUTPATIENT
Start: 2024-03-21

## 2024-03-24 ENCOUNTER — PATIENT MESSAGE (OUTPATIENT)
Dept: GASTROENTEROLOGY | Facility: CLINIC | Age: 44
End: 2024-03-24
Payer: MEDICARE

## 2024-04-01 ENCOUNTER — DOCUMENTATION ONLY (OUTPATIENT)
Dept: GASTROENTEROLOGY | Facility: CLINIC | Age: 44
End: 2024-04-01
Payer: MEDICARE

## 2024-04-01 NOTE — PROGRESS NOTES
After hours record review  Records from [facility]  October 23 CMP and CBC normal  March 22 CBC and CMP normal  Gastric biopsy March 22 mild congestion negative for H pylori  SIBO breath test March 21 negative for SIBO  Leg it negative for lactose malabsorption    Office visit February 2024 during the office visit there has a chart review that stage long history of symptoms going back to 2015 with many tests including multiple EGDs ultrasound CT scan stool samples colonoscopy MRI gastric emptying scan.  The only abnormality was in 2015 there was some nonspecific inflammation of the terminal ileum and she was actually referred to Dr. Santana as a possible Crohn's patient but that was excluded it mentioned last colonoscopy was 2019 the problems identified at that time were diarrhea and abdominal pain and morbid obesity.  A multi pathogen stool panel was sent and the patient was put on pantoprazole and dicyclomine    Office visit April 2022 this is follow-up of EGD.  Patient was complaining of reflux, complaining of early satiety  I reviewed the March 2022 EGD done by Dr. Elizabeth Harrington essentially normal except for some erythema nodularity of the antrum  \  It looks like last colonoscopy was 2019, so if the diarrhea is persistent now it would not be unreasonable to repeat that although the symptoms now sounds similar to the symptoms of the last 10 years in general

## 2024-04-19 DIAGNOSIS — G62.9 NEUROPATHY: ICD-10-CM

## 2024-04-19 RX ORDER — GABAPENTIN 300 MG/1
300 CAPSULE ORAL 3 TIMES DAILY
Qty: 90 CAPSULE | Refills: 0 | Status: SHIPPED | OUTPATIENT
Start: 2024-04-19 | End: 2024-05-15

## 2024-04-19 NOTE — TELEPHONE ENCOUNTER
No care due was identified.  Health [facility] Embedded Care Due Messages. Reference number: [id number].   4/19/2024 6:12:10 AM CDT

## 2024-04-23 ENCOUNTER — TELEPHONE (OUTPATIENT)
Dept: GASTROENTEROLOGY | Facility: CLINIC | Age: 44
End: 2024-04-23
Payer: MEDICARE

## 2024-04-23 ENCOUNTER — PATIENT MESSAGE (OUTPATIENT)
Dept: GASTROENTEROLOGY | Facility: CLINIC | Age: 44
End: 2024-04-23
Payer: MEDICARE

## 2024-04-23 RX ORDER — DIPHENOXYLATE HYDROCHLORIDE AND ATROPINE SULFATE 2.5; .025 MG/1; MG/1
1 TABLET ORAL 4 TIMES DAILY PRN
Qty: 60 TABLET | Refills: 0 | Status: SHIPPED | OUTPATIENT
Start: 2024-04-23 | End: 2024-05-08

## 2024-04-23 RX ORDER — DIPHENOXYLATE HYDROCHLORIDE AND ATROPINE SULFATE 2.5; .025 MG/1; MG/1
1 TABLET ORAL 4 TIMES DAILY PRN
Qty: 60 TABLET | Refills: 0 | Status: SHIPPED | OUTPATIENT
Start: 2024-04-23 | End: 2024-04-23 | Stop reason: SDUPTHER

## 2024-04-23 NOTE — TELEPHONE ENCOUNTER
"----- Message from Ajit Diaz MD sent at 4/23/2024 12:47 PM CDT -----  Regarding: RE: Clarification  Contact: Yissel @ [phone]  Usually that pops up on epic automatically  Prescription redone  ----- Message -----  From: Luanne Zaragoza MA  Sent: 4/23/2024  11:58 AM CDT  To: Ajit Diaz MD  Subject: FW: Clarification                                Hi, need to send in new Rx for Lomotil stating on Rx "medically necessary for greater than 7 days".  Delmy  ----- Message -----  From: Janelle Thompson  Sent: 4/23/2024  11:07 AM CDT  To: Joe TEJADA Staff  Subject: Clarification                                    Yissel from [facility] Is calling to see if medicationdiphenoxylate-atropine 2.5-0.025 mg (LOMOTIL) 2.5-0.025 mg per tablet  is for acute or chronic. Asking for a call back  "

## 2024-05-15 DIAGNOSIS — G62.9 NEUROPATHY: ICD-10-CM

## 2024-05-15 RX ORDER — GABAPENTIN 300 MG/1
300 CAPSULE ORAL 3 TIMES DAILY
Qty: 90 CAPSULE | Refills: 5 | Status: SHIPPED | OUTPATIENT
Start: 2024-05-15

## 2024-05-15 NOTE — TELEPHONE ENCOUNTER
No care due was identified.  [facility] Embedded Care Due Messages. Reference number: [id number].   5/15/2024 9:59:31 AM CDT

## 2024-09-05 ENCOUNTER — PATIENT MESSAGE (OUTPATIENT)
Dept: FAMILY MEDICINE | Facility: CLINIC | Age: 44
End: 2024-09-05

## 2024-09-05 ENCOUNTER — OFFICE VISIT (OUTPATIENT)
Dept: FAMILY MEDICINE | Facility: CLINIC | Age: 44
End: 2024-09-05
Payer: MEDICARE

## 2024-09-05 ENCOUNTER — TELEPHONE (OUTPATIENT)
Dept: FAMILY MEDICINE | Facility: CLINIC | Age: 44
End: 2024-09-05

## 2024-09-05 VITALS
TEMPERATURE: 99 F | WEIGHT: 221.81 LBS | BODY MASS INDEX: 40.82 KG/M2 | SYSTOLIC BLOOD PRESSURE: 128 MMHG | DIASTOLIC BLOOD PRESSURE: 76 MMHG | OXYGEN SATURATION: 95 % | HEIGHT: 62 IN | HEART RATE: 73 BPM

## 2024-09-05 DIAGNOSIS — E66.01 MORBID OBESITY: ICD-10-CM

## 2024-09-05 DIAGNOSIS — F41.9 ANXIETY: ICD-10-CM

## 2024-09-05 DIAGNOSIS — R79.9 ABNORMAL FINDING OF BLOOD CHEMISTRY, UNSPECIFIED: ICD-10-CM

## 2024-09-05 DIAGNOSIS — I10 ESSENTIAL HYPERTENSION: ICD-10-CM

## 2024-09-05 DIAGNOSIS — G47.00 INSOMNIA, UNSPECIFIED TYPE: ICD-10-CM

## 2024-09-05 DIAGNOSIS — Z12.31 ENCOUNTER FOR SCREENING MAMMOGRAM FOR BREAST CANCER: Primary | ICD-10-CM

## 2024-09-05 DIAGNOSIS — Z13.1 DIABETES MELLITUS SCREENING: ICD-10-CM

## 2024-09-05 DIAGNOSIS — G43.009 MIGRAINE WITHOUT AURA AND WITHOUT STATUS MIGRAINOSUS, NOT INTRACTABLE: ICD-10-CM

## 2024-09-05 PROCEDURE — 99214 OFFICE O/P EST MOD 30 MIN: CPT | Mod: S$PBB,,, | Performed by: FAMILY MEDICINE

## 2024-09-05 PROCEDURE — 99999 PR PBB SHADOW E&M-EST. PATIENT-LVL III: CPT | Mod: PBBFAC,,, | Performed by: FAMILY MEDICINE

## 2024-09-05 PROCEDURE — 99213 OFFICE O/P EST LOW 20 MIN: CPT | Mod: PBBFAC,PO | Performed by: FAMILY MEDICINE

## 2024-09-05 RX ORDER — ATENOLOL 25 MG/1
25 TABLET ORAL DAILY
Qty: 90 TABLET | Refills: 1 | Status: SHIPPED | OUTPATIENT
Start: 2024-09-05

## 2024-09-05 RX ORDER — ZOLPIDEM TARTRATE 10 MG/1
10 TABLET ORAL NIGHTLY
Qty: 30 TABLET | Refills: 5 | Status: CANCELLED | OUTPATIENT
Start: 2024-09-05

## 2024-09-05 RX ORDER — TOPIRAMATE 100 MG/1
100 TABLET, FILM COATED ORAL DAILY
Qty: 90 TABLET | Refills: 1 | Status: SHIPPED | OUTPATIENT
Start: 2024-09-05

## 2024-09-05 RX ORDER — ALPRAZOLAM 0.5 MG/1
0.5 TABLET ORAL 2 TIMES DAILY
Qty: 60 TABLET | Refills: 5 | Status: SHIPPED | OUTPATIENT
Start: 2024-09-05

## 2024-09-05 RX ORDER — ZOLPIDEM TARTRATE 10 MG/1
10 TABLET ORAL NIGHTLY
Qty: 30 TABLET | Refills: 5 | Status: SHIPPED | OUTPATIENT
Start: 2024-09-05

## 2024-09-05 NOTE — TELEPHONE ENCOUNTER
----- Message from Anna Diaz sent at 9/5/2024 10:05 AM CDT -----  Regarding: Refill  Type: RX Refill Request    Who Called:PT    RX Name and Strength:zolpidem (AMBIEN) 10 mg Tab    Preferred Pharmacy with phone number:  [id number] - [place], [place] - [address] [address]        Would the patient rather a call back or a response via [facility]?call back     Best Call Back Number:[phone]    Additional Information:Pt was seen this morning and this medication wasn't sent

## 2024-09-05 NOTE — PROGRESS NOTES
Subjective     Patient ID: Sarah Mukherjee is a 44 y.o. female.    Chief Complaint: Medication Refill    44 year old female presents for refills of her medication. She has hypertension and her blood pressure is controlled. She is due for refills of her medications.           Past Medical History:   Diagnosis Date    Cancer     skin cancer removed    Crohn's disease     Degenerative joint disease of spine     Esophagitis     History of stomach ulcers     Lumbar facet arthropathy 6/20/2014    Neurogenic bladder       Past Surgical History:   Procedure Laterality Date    BREAST RECONSTRUCTION      CHOLECYSTECTOMY      HYSTERECTOMY      total hysterectomy in 20s due to fibroid, endometriosis    INJECTION OF ANESTHETIC AGENT AROUND NERVE Left 01/04/2019    Procedure: Block, Nerve MEDIAL BRANCH BLOCKS LEFT LUMBAR L5-S3;  Surgeon: Andra Burt MD;  Location: [facility];  Service: Pain Management;  Laterality: Left;  1 OF 2    INJECTION OF JOINT Left 06/28/2019    Procedure: INJECTION, JOINT COCCYX;  Surgeon: Andra Burt MD;  Location: [facility];  Service: Pain Management;  Laterality: Left;  INJECT JOINt & LEFT SIDE    PELVIC LAPAROSCOPY      TONSILLECTOMY       Family History   Problem Relation Name Age of Onset    Diabetes Mother Gabriela Kuhn     Hypertension Mother Gabriela Kuhn     Arthritis Mother Gabriela Bam     Atrial fibrillation Mother Gabriela Kuhn     No Known Problems Father      Diabetes Maternal Grandmother Ara Oconnell     Heart disease Maternal Grandmother Ara Oconnell     Heart disease Paternal Grandmother Arely Kuhn     Heart disease Paternal Grandfather Artur Youngmer Sr     Breast cancer Maternal Aunt  47    Ovarian cancer Maternal Aunt      Cancer Maternal Uncle Brian Kourtney      Social History     Socioeconomic History    Marital status:    Tobacco Use    Smoking status: Never     Passive exposure: Never    Smokeless tobacco: Never   Substance and Sexual  "Activity    Alcohol use: Not Currently     Comment: occ    Drug use: No    Sexual activity: Yes     Partners: Male     Birth control/protection: None     Comment:  for 19 years to the same man     Social Determinants of Health     Financial Resource Strain: Low Risk  (7/16/2024)    Received from [facility]    Overall Financial Resource Strain (CARDIA)     Difficulty of Paying Living Expenses: Not hard at all   Food Insecurity: No Food Insecurity (7/16/2024)    Received from [facility]    Hunger Vital Sign     Worried About Running Out of Food in the Last Year: Never true     Ran Out of Food in the Last Year: Never true   Transportation Needs: No Transportation Needs (7/16/2024)    Received from [facility]    PRAPARE - Transportation     Lack of Transportation (Medical): No     Lack of Transportation (Non-Medical): No   Physical Activity: Insufficiently Active (7/16/2024)    Received from [facility]    Exercise Vital Sign     Days of Exercise per Week: 3 days     Minutes of Exercise per Session: 30 min   Stress: No Stress Concern Present (7/16/2024)    Received from [facility]    [employer] [place] [employer] - Occupational Stress Questionnaire     Feeling of Stress : Only a little   Housing Stability: Low Risk  (12/26/2023)    Housing Stability Vital Sign     Unable to Pay for Housing in the Last Year: No     Number of Places Lived in the Last Year: 1     Unstable Housing in the Last Year: No       Review of Systems   Respiratory:  Negative for cough, chest tightness, shortness of breath and wheezing.    Cardiovascular:  Negative for chest pain, palpitations and leg swelling.   Neurological:  Negative for dizziness, syncope, light-headedness and headaches.   Psychiatric/Behavioral:  Positive for agitation.           Objective   Vitals:    09/05/24 0837   BP: 128/76   Pulse: 73   Temp: 98.5 °F (36.9 °C)   TempSrc: Oral   SpO2: 95%   Weight: 100.6 kg (221 lb 12.5 oz)   Height: 5' 2" (1.575 m) "       Physical Exam  Constitutional:       General: She is not in acute distress.     Appearance: She is well-developed. She is not diaphoretic.   HENT:      Head: Normocephalic and atraumatic.   Eyes:      Conjunctiva/sclera: Conjunctivae normal.   Neck:      Vascular: No carotid bruit.   Cardiovascular:      Rate and Rhythm: Normal rate and regular rhythm.      Heart sounds: Normal heart sounds. No murmur heard.     No friction rub. No gallop.   Pulmonary:      Effort: Pulmonary effort is normal. No respiratory distress.      Breath sounds: Normal breath sounds. No stridor. No wheezing, rhonchi or rales.   Abdominal:      General: Abdomen is flat. Bowel sounds are normal. There is no distension.      Palpations: Abdomen is soft. There is no mass.      Tenderness: There is no abdominal tenderness. There is no guarding or rebound.      Hernia: No hernia is present.   Musculoskeletal:      Cervical back: Normal range of motion and neck supple.      Right lower leg: No edema.      Left lower leg: No edema.   Neurological:      Mental Status: She is alert and oriented to person, place, and time.   Psychiatric:         Mood and Affect: Mood normal.         Behavior: Behavior normal.            Assessment and Plan     1. Encounter for screening mammogram for breast cancer  -     Mammo Digital Screening Bilat w/ Jorje; Future; Expected date: 09/05/2024  -     Mammo Digital Screening Bilat; Future; Expected date: 09/05/2024  Due for mammogram    2. Essential hypertension  -     CBC Auto Differential; Future; Expected date: 09/05/2024  -     Comprehensive Metabolic Panel; Future; Expected date: 09/05/2024  -     Lipid Panel; Future; Expected date: 09/05/2024  -     TSH; Future; Expected date: 09/05/2024  -     atenoloL (TENORMIN) 25 MG tablet; Take 1 tablet (25 mg total) by mouth once daily.  Dispense: 90 tablet; Refill: 1  Stable. Refilled meds and due for labs     3. Morbid obesity    4. Diabetes mellitus screening  -      Hemoglobin A1C; Future; Expected date: 09/05/2024    5. Abnormal finding of blood chemistry, unspecified  -     Hemoglobin A1C; Future; Expected date: 09/05/2024    6. Anxiety  -     ALPRAZolam (XANAX) 0.5 MG tablet; Take 1 tablet (0.5 mg total) by mouth 2 (two) times daily.  Dispense: 60 tablet; Refill: 5  Stable. Refilled meds.     7. Insomnia, unspecified type  Stable and refilled medications     8. Migraine without aura and without status migrainosus, not intractable  -     topiramate (TOPAMAX) 100 MG tablet; Take 1 tablet (100 mg total) by mouth once daily.  Dispense: 90 tablet; Refill: 1                 No follow-ups on file.

## 2024-11-10 DIAGNOSIS — G62.9 NEUROPATHY: ICD-10-CM

## 2024-11-10 NOTE — TELEPHONE ENCOUNTER
No care due was identified.  [facility] Embedded Care Due Messages. Reference number: [id number].   11/10/2024 4:15:36 PM CST

## 2024-11-11 RX ORDER — GABAPENTIN 300 MG/1
300 CAPSULE ORAL 3 TIMES DAILY
Qty: 90 CAPSULE | Refills: 0 | Status: SHIPPED | OUTPATIENT
Start: 2024-11-11

## 2024-11-11 NOTE — TELEPHONE ENCOUNTER
Refill Routing Note   Medication(s) are not appropriate for processing by [facility] for the following reason(s):        Outside of protocol    [facility] action(s):  Route               Appointments  past 12m or future 3m with PCP    Date Provider   Last Visit   9/5/2024 Staci Sorenson MD   Next Visit   Visit date not found Staci Sorenson MD   ED visits in past 90 days: 0        Note composed:9:29 AM 11/11/2024

## 2024-12-23 DIAGNOSIS — G62.9 NEUROPATHY: ICD-10-CM

## 2024-12-23 NOTE — TELEPHONE ENCOUNTER
No care due was identified.  [facility] Embedded Care Due Messages. Reference number: [id number].   12/23/2024 3:13:03 AM CST

## 2024-12-24 RX ORDER — GABAPENTIN 300 MG/1
300 CAPSULE ORAL 3 TIMES DAILY
Qty: 90 CAPSULE | Refills: 0 | Status: SHIPPED | OUTPATIENT
Start: 2024-12-24

## 2025-01-07 ENCOUNTER — NURSE TRIAGE (OUTPATIENT)
Dept: ADMINISTRATIVE | Facility: CLINIC | Age: 45
End: 2025-01-07
Payer: MEDICARE

## 2025-01-07 ENCOUNTER — OFFICE VISIT (OUTPATIENT)
Dept: INTERNAL MEDICINE | Facility: CLINIC | Age: 45
End: 2025-01-07
Payer: MEDICARE

## 2025-01-07 VITALS
HEART RATE: 66 BPM | WEIGHT: 218.69 LBS | SYSTOLIC BLOOD PRESSURE: 134 MMHG | DIASTOLIC BLOOD PRESSURE: 70 MMHG | OXYGEN SATURATION: 99 % | HEIGHT: 62 IN | BODY MASS INDEX: 40.25 KG/M2

## 2025-01-07 DIAGNOSIS — E66.01 MORBID OBESITY: ICD-10-CM

## 2025-01-07 DIAGNOSIS — R00.0 TACHYCARDIA: Primary | ICD-10-CM

## 2025-01-07 PROCEDURE — 99214 OFFICE O/P EST MOD 30 MIN: CPT | Mod: S$PBB,,, | Performed by: INTERNAL MEDICINE

## 2025-01-07 PROCEDURE — 99214 OFFICE O/P EST MOD 30 MIN: CPT | Mod: PBBFAC | Performed by: INTERNAL MEDICINE

## 2025-01-07 PROCEDURE — G2211 COMPLEX E/M VISIT ADD ON: HCPCS | Mod: S$PBB,,, | Performed by: INTERNAL MEDICINE

## 2025-01-07 PROCEDURE — 99999 PR PBB SHADOW E&M-EST. PATIENT-LVL IV: CPT | Mod: PBBFAC,,, | Performed by: INTERNAL MEDICINE

## 2025-01-07 NOTE — TELEPHONE ENCOUNTER
Patient called back, states she mad a VV appointment with Dr. Sorenson today but provider had to cancel. Had an episode with her heart, rate of heart, ECG done states  A fib per her watch. This happened at 7:38 till 8:20  Pulse is 66 now. Denies feeling of lightheaded, chest pain or any other S/S. Would like appointment. Triage process explained. Triage done- dispo see in office today. Advised she needed to be seen today. Unable to make appointment in desired location. Appointment made to today at [facility]. Time, location and provider verified. Verb understanding. Advised patient she needed to be seen today, and if any S/S occur again ED. Verb understanding.   Reason for Disposition   Patient wants to be seen    Additional Information   Negative: Passed out (e.g., fainted, lost consciousness, blacked out and was not responding)   Negative: Shock suspected (e.g., cold/pale/clammy skin, too weak to stand, low BP, rapid pulse)   Negative: Difficult to awaken or acting confused (e.g., disoriented, slurred speech)   Negative: Visible sweat on face or sweat dripping down face   Negative: Unable to walk, or can only walk with assistance (e.g., requires support)   Negative: Received SHOCK from implantable cardiac defibrillator and has persisting symptoms (i.e., palpitations, lightheadedness)   Negative: Feeling weak or lightheaded (e.g., woozy, feeling like they might faint) AND heart beating very rapidly (e.g., > 140 / minute)   Negative: Feeling weak or lightheaded (e.g., woozy, feeling like they might faint) AND heart beating very slowly (e.g., < 50 / minute)   Negative: Sounds like a life-threatening emergency to the triager   Negative: Chest pain   Negative: Difficulty breathing   Negative: Feeling weak or lightheaded (e.g., woozy, feeling like they might faint)   Negative: Heart beating very rapidly (e.g., > 140 / minute) and present now  (Exception: During exercise.)   Negative: Heart beating very slowly (e.g., < 50 /  minute)  (Exception: Athlete and heart rate normal for caller.)   Negative: New or worsened shortness of breath with activity (dyspnea on exertion)   Negative: Patient sounds very sick or weak to the triager   Negative: Wearing a cardiac monitor (e.g., cardiac event, Holter)   Negative: Received SHOCK from implantable cardiac defibrillator (and now feels well)   Negative: Heart beating very rapidly (e.g., > 140 / minute) and not present now  (Exception: During exercise.)   Negative: Skipped or extra beat(s) and increases with exercise or exertion   Negative: Skipped or extra beat(s) and occurs 4 or more times per minute   Negative: History of heart disease (i.e., heart attack, bypass surgery, angina, angioplasty)  (Exception: Brief heartbeat symptoms that went away and now feels well.)   Negative: Age > 60 years  (Exception: Brief heartbeat symptoms that went away and now feels well.)   Negative: Taking water pill (i.e., diuretic) or heart medication (e.g., digoxin)    Protocols used: Heart Rate and Heartbeat Dmkywlckn-C-PH

## 2025-01-07 NOTE — PROGRESS NOTES
Subjective     Patient ID: Sarah Mukherjee is a 44 y.o. female.    Chief Complaint: Heart Problem and Chest Pain             History of Present Illness    CHIEF COMPLAINT:  Sarah presents today with episodes of rapid heart rate.    CARDIAC SYMPTOMS:  She reports two episodes of rapid heart rate - first episode occurred a few weeks ago lasting one hour, with second episode occurring this morning lasting 30 minutes. Episodes occur while sitting at rest, not during physical activity. Her watch bandar indicates possible atrial fibrillation during these episodes. She reports prior brief episodes lasting only seconds that were not captured on the watch bandar. She denies chest pain, weakness, or shortness of breath during episodes. While she experiences anxiety at times, she denies feeling anxious during these cardiac episodes.    FAMILY HISTORY:  She has a family history of heart disease. Both maternal and paternal grandparents  from heart disease. Mother has cardiac issues requiring medication.    MEDICAL HISTORY:  She has a history of bladder dysfunction resulting in Social Security disability. She has an implanted stimulator for back pain and SI joint which has significantly improved bladder function.    MEDICATIONS:  She takes Atenolol for heart rate management, Gabapentin, Xanax, Topamax, and Zolpidem.    SOCIAL HISTORY:  She denies tobacco and alcohol use. She drinks coffee and water, and denies consumption of soft drinks.  She denies drug use as well.      ROS:  Constitutional: negative activity change, negative unexpected weight change  HENT: negative trouble swallowing  Eyes: negative discharge, negative visual disturbance  Respiratory: negative chest tightness, negative wheezing, negative shortness of breath  Cardiovascular: negative chest pain, +palpitations  Gastrointestinal: negative blood in stool, negative constipation, negative diarrhea, negative vomiting  Endocrine: negative polydipsia, negative  polyuria  Genitourinary: negative difficulty urinating, negative dysuria, negative hematuria  Musculoskeletal: negative arthralgias, negative joint swelling, negative neck pain  Neurological: negative weakness, negative headaches  Psychiatric/Behavioral: negative confusion, negative dysphoric mood       A few wks ago 1 hr of fast HR    Today recurred -- 30 minutes.    Symptoms not exertional.    Apple watch says afib.    HPI  Review of Systems     Objective     Physical Exam  Vitals reviewed.   Constitutional:       General: She is not in acute distress.     Appearance: Normal appearance. She is well-developed. She is not ill-appearing, toxic-appearing or diaphoretic.   HENT:      Head: Normocephalic and atraumatic.   Eyes:      General: No scleral icterus.     Pupils: Pupils are equal, round, and reactive to light.   Neck:      Thyroid: No thyromegaly.   Cardiovascular:      Rate and Rhythm: Normal rate and regular rhythm.      Heart sounds: Normal heart sounds. No murmur heard.     No friction rub. No gallop.   Pulmonary:      Effort: Pulmonary effort is normal. No respiratory distress.      Breath sounds: Normal breath sounds. No wheezing or rales.   Abdominal:      General: Bowel sounds are normal. There is no distension.      Palpations: Abdomen is soft. There is no mass.      Tenderness: There is no abdominal tenderness. There is no guarding or rebound.   Musculoskeletal:         General: No tenderness. Normal range of motion.      Cervical back: Normal range of motion.   Lymphadenopathy:      Cervical: No cervical adenopathy.   Neurological:      General: No focal deficit present.      Mental Status: She is alert and oriented to person, place, and time.   Psychiatric:         Mood and Affect: Mood normal.         Speech: Speech normal.         Behavior: Behavior normal.            Assessment and Plan     1. Tachycardia  -     Ambulatory referral/consult to Electrophysiology; Future; Expected date:  01/14/2025    2. Morbid obesity  Has been stable.      Assessment & Plan    OBESITY:  - Monitored patient's reports of rapid heart rate episodes, including two significant events: one lasting an hour a few weeks ago, and another for 30 minutes on the morning of the visit.  - Performed physical exam and EKG, noting difficulty in interpretation due to patient tremor.  - Assessed condition as not immediately life-threatening but requiring further specialist evaluation.    TACHYCARDIA:  - Reviewed Apple Watch ECG strips.  Apple watch strips do not clearly look like AFib to me.  - Due to recurrent nature of symptoms and inability to capture on previous 24-hour monitor, referred patient to an electrophysiologist for further evaluation and potential extended cardiac monitoring.  - Discussed signs warranting emergency room visit, including angina pectoris or pre-syncope during an episode.  If symptoms worsen she will go to the emergency room.    HYPERTENSION:  - Sarah is currently taking antihypertensive medication.  - Continued Atenolol for heart rate control, which may also be used for blood pressure management.    ANXIETY:  - Considered anxiety as a potential contributing factor to patient's symptoms, although patient denied feeling anxious during the recent episode.  - Acknowledged that patient experiences anxiety at times.  - Continued Alprazolam, which is commonly used to treat anxiety disorders.            No follow-ups on file.    Visit today included increased complexity associated with the care of the episodic problem  addressed and managing the longitudinal care of the patient due to the serious and/or complex managed problem(s) .    Future Appointments   Date Time Provider Department Center   2/17/2025  8:40 AM Sven Andrew MD [facility] [place]         This note was generated with the assistance of ambient listening technology. Verbal consent was obtained by the patient and accompanying visitor(s) for the  recording of patient appointment to facilitate this note. I attest to having reviewed and edited the generated note for accuracy, though some syntax or spelling errors may persist. Please contact the author of this note for any clarification.

## 2025-01-23 DIAGNOSIS — G62.9 NEUROPATHY: ICD-10-CM

## 2025-01-23 DIAGNOSIS — G43.009 MIGRAINE WITHOUT AURA AND WITHOUT STATUS MIGRAINOSUS, NOT INTRACTABLE: ICD-10-CM

## 2025-01-23 DIAGNOSIS — I10 ESSENTIAL HYPERTENSION: ICD-10-CM

## 2025-01-23 NOTE — TELEPHONE ENCOUNTER
No care due was identified.  Health [facility] Embedded Care Due Messages. Reference number: [id number].   1/23/2025 5:09:44 PM CST

## 2025-01-24 DIAGNOSIS — E66.01 MORBID OBESITY: ICD-10-CM

## 2025-01-24 DIAGNOSIS — I47.10 SVT (SUPRAVENTRICULAR TACHYCARDIA): Primary | ICD-10-CM

## 2025-01-24 RX ORDER — GABAPENTIN 300 MG/1
300 CAPSULE ORAL 3 TIMES DAILY
Qty: 270 CAPSULE | Refills: 3 | Status: SHIPPED | OUTPATIENT
Start: 2025-01-24

## 2025-01-24 RX ORDER — ATENOLOL 25 MG/1
25 TABLET ORAL DAILY
Qty: 90 TABLET | Refills: 0 | Status: SHIPPED | OUTPATIENT
Start: 2025-01-24

## 2025-01-24 RX ORDER — GABAPENTIN 300 MG/1
300 CAPSULE ORAL 3 TIMES DAILY
Qty: 90 CAPSULE | Refills: 0 | Status: SHIPPED | OUTPATIENT
Start: 2025-01-24

## 2025-01-24 RX ORDER — TOPIRAMATE 100 MG/1
100 TABLET, FILM COATED ORAL DAILY
Qty: 90 TABLET | Refills: 0 | Status: SHIPPED | OUTPATIENT
Start: 2025-01-24

## 2025-01-24 NOTE — TELEPHONE ENCOUNTER
No care due was identified.  [facility] Embedded Care Due Messages. Reference number: [id number].   1/23/2025 6:43:41 PM CST

## 2025-02-05 ENCOUNTER — TELEPHONE (OUTPATIENT)
Dept: ELECTROPHYSIOLOGY | Facility: CLINIC | Age: 45
End: 2025-02-05
Payer: MEDICARE

## 2025-02-05 NOTE — TELEPHONE ENCOUNTER
Patient coming to see Dr Andrew for evaluation of possible Afib (from Dr Champion). No outside testing or records. Her Apple Watch tracing was scanned in to Media tab:

## 2025-02-11 PROBLEM — R00.0 TACHYCARDIA: Status: ACTIVE | Noted: 2025-02-11

## 2025-02-11 NOTE — PROGRESS NOTES
Subjective   Patient ID:  Sarah Mukherjee is a 45 y.o. female who presents for evaluation of Tachycardia      HPI 46 yo female with tachycardia, spinal stenosis with neurogenic bladder, chronic pain syndrome s/p spinal cord stimulator, morbid obesity.  She is at home.  Has had 2 episodes of palpitations. Occurs at rest, acute onset, lasting up to an hour, gradual offset.  Watch monitor indicated possible AF. Review of strips reveals p waves, unclear whether this is sinus tachycardia or long RP tachycardia.    Has had palpitations, briefer in nature for extended period. 12/19 holter revealed no significant arrhythmias.    Not exercising (limited by back).  2 cups of coffee daily.  No alcohol.    Has not been evaluated for sleep apnea.      Review of Systems   Constitutional: Negative. Negative for fever and malaise/fatigue.   HENT:  Negative for congestion and sore throat.    Cardiovascular:  Positive for palpitations. Negative for chest pain, dyspnea on exertion, irregular heartbeat, leg swelling, near-syncope, orthopnea, paroxysmal nocturnal dyspnea and syncope.   Respiratory:  Negative for cough and shortness of breath.    Gastrointestinal:  Negative for abdominal pain, constipation and diarrhea.   Neurological:  Negative for dizziness, light-headedness and weakness.   Psychiatric/Behavioral:  Negative for depression. The patient is not nervous/anxious.           Objective     Physical Exam  Constitutional:       Appearance: She is well-developed.   Eyes:      General: No scleral icterus.     Conjunctiva/sclera: Conjunctivae normal.   Neck:      Vascular: No JVD.      Trachea: No tracheal deviation.   Cardiovascular:      Rate and Rhythm: Normal rate and regular rhythm.      Chest Wall: PMI is not displaced.   Pulmonary:      Effort: Pulmonary effort is normal. No respiratory distress.      Breath sounds: Normal breath sounds.   Abdominal:      Palpations: Abdomen is soft.      Tenderness: There is no abdominal  tenderness.   Musculoskeletal:         General: No tenderness.   Skin:     General: Skin is warm and dry.      Findings: No rash.   Neurological:      Mental Status: She is alert and oriented to person, place, and time.   Psychiatric:         Behavior: Behavior normal.            Assessment and Plan     1. Tachycardia    2. Neurogenic bladder    3. Chronic pain syndrome    4. Morbid obesity        Plan:       Palpitations of unclear etiology. Her monitor is not definitive by any means.  Recommend:  Obtaining [employer]  Echo  Event monitor  If negative >> can f/u PRN

## 2025-02-14 ENCOUNTER — TELEPHONE (OUTPATIENT)
Dept: ELECTROPHYSIOLOGY | Facility: CLINIC | Age: 45
End: 2025-02-14
Payer: MEDICARE

## 2025-02-17 ENCOUNTER — OFFICE VISIT (OUTPATIENT)
Dept: ELECTROPHYSIOLOGY | Facility: CLINIC | Age: 45
End: 2025-02-17
Payer: MEDICARE

## 2025-02-17 VITALS
HEART RATE: 70 BPM | HEIGHT: 62 IN | BODY MASS INDEX: 40.25 KG/M2 | WEIGHT: 218.69 LBS | SYSTOLIC BLOOD PRESSURE: 110 MMHG | DIASTOLIC BLOOD PRESSURE: 70 MMHG

## 2025-02-17 DIAGNOSIS — R00.0 TACHYCARDIA: Primary | ICD-10-CM

## 2025-02-17 DIAGNOSIS — E66.01 MORBID OBESITY: ICD-10-CM

## 2025-02-17 DIAGNOSIS — N31.9 NEUROGENIC BLADDER: ICD-10-CM

## 2025-02-17 DIAGNOSIS — I47.10 SVT (SUPRAVENTRICULAR TACHYCARDIA): ICD-10-CM

## 2025-02-17 DIAGNOSIS — G89.4 CHRONIC PAIN SYNDROME: ICD-10-CM

## 2025-02-17 LAB
OHS QRS DURATION: 74 MS
OHS QTC CALCULATION: 421 MS

## 2025-02-17 PROCEDURE — 93005 ELECTROCARDIOGRAM TRACING: CPT | Mod: PBBFAC | Performed by: INTERNAL MEDICINE

## 2025-02-17 PROCEDURE — 99999 PR PBB SHADOW E&M-EST. PATIENT-LVL III: CPT | Mod: PBBFAC,,, | Performed by: INTERNAL MEDICINE

## 2025-02-17 PROCEDURE — 99213 OFFICE O/P EST LOW 20 MIN: CPT | Mod: PBBFAC | Performed by: INTERNAL MEDICINE

## 2025-02-18 ENCOUNTER — CLINICAL SUPPORT (OUTPATIENT)
Dept: CARDIOLOGY | Facility: HOSPITAL | Age: 45
End: 2025-02-18
Attending: INTERNAL MEDICINE
Payer: MEDICARE

## 2025-02-18 ENCOUNTER — HOSPITAL ENCOUNTER (OUTPATIENT)
Dept: CARDIOLOGY | Facility: HOSPITAL | Age: 45
Discharge: HOME | End: 2025-02-18
Attending: INTERNAL MEDICINE
Payer: MEDICARE

## 2025-02-18 VITALS
HEIGHT: 62 IN | BODY MASS INDEX: 40.12 KG/M2 | DIASTOLIC BLOOD PRESSURE: 70 MMHG | HEART RATE: 65 BPM | WEIGHT: 218 LBS | SYSTOLIC BLOOD PRESSURE: 110 MMHG

## 2025-02-18 LAB
ASCENDING AORTA: 3.05 CM
AV AREA BY CONTINUOUS VTI: 2.6 CM2
AV INDEX (PROSTH): 0.88
AV LVOT MEAN GRADIENT: 3 MMHG
AV LVOT PEAK GRADIENT: 5 MMHG
AV MEAN GRADIENT: 4 MMHG
AV PEAK GRADIENT: 8 MMHG
AV VALVE AREA BY VELOCITY RATIO: 2.2 CM²
AV VALVE AREA: 2.5 CM2
AV VELOCITY RATIO: 0.79
BSA FOR ECHO PROCEDURE: 2.08 M2
CV ECHO LV RWT: 0.27 CM
DOP CALC AO PEAK VEL: 1.4 M/S
DOP CALC AO VTI: 28.6 CM
DOP CALC LVOT AREA: 2.8 CM2
DOP CALC LVOT DIAMETER: 1.9 CM
DOP CALC LVOT PEAK VEL: 1.1 M/S
DOP CALC LVOT STROKE VOLUME: 71.1 CM3
DOP CALCLVOT PEAK VEL VTI: 25.1 CM
E WAVE DECELERATION TIME: 193 MS
E/A RATIO: 1.13
E/E' RATIO: 6 M/S
ECHO EF ESTIMATED: 51 %
ECHO LV POSTERIOR WALL: 0.7 CM (ref 0.6–1.1)
FRACTIONAL SHORTENING: 25.5 % (ref 28–44)
INTERVENTRICULAR SEPTUM: 0.8 CM (ref 0.6–1.1)
IVC DIAMETER: 1.89 CM
IVRT: 91 MS
LA MAJOR: 5.1 CM
LA MINOR: 4.6 CM
LA WIDTH: 3.8 CM
LEFT ATRIUM SIZE: 3.7 CM
LEFT ATRIUM VOLUME INDEX MOD: 28 ML/M2
LEFT ATRIUM VOLUME INDEX: 29 ML/M2
LEFT ATRIUM VOLUME MOD: 56 ML
LEFT ATRIUM VOLUME: 58 CM3
LEFT INTERNAL DIMENSION IN SYSTOLE: 3.8 CM (ref 2.1–4)
LEFT VENTRICLE DIASTOLIC VOLUME INDEX: 63.49 ML/M2
LEFT VENTRICLE DIASTOLIC VOLUME: 125.71 ML
LEFT VENTRICLE MASS INDEX: 65.4 G/M2
LEFT VENTRICLE SYSTOLIC VOLUME INDEX: 30.9 ML/M2
LEFT VENTRICLE SYSTOLIC VOLUME: 61.25 ML
LEFT VENTRICULAR INTERNAL DIMENSION IN DIASTOLE: 5.1 CM (ref 3.5–6)
LEFT VENTRICULAR MASS: 129.4 G
LV LATERAL E/E' RATIO: 5.5
LV SEPTAL E/E' RATIO: 7.5
MV A" WAVE DURATION": 185.54 MS
MV PEAK A VEL: 0.53 M/S
MV PEAK E VEL: 0.6 M/S
OHS CV RV/LV RATIO: 0.57 CM
PISA TR MAX VEL: 2.1 M/S
PULM VEIN A" WAVE DURATION": 185.54 MS
PULM VEIN S/D RATIO: 1.15
PULMONIC VEIN PEAK A VELOCITY: 0.2 M/S
PV PEAK D VEL: 0.4 M/S
PV PEAK S VEL: 0.46 M/S
RA MAJOR: 3.99 CM
RA PRESSURE ESTIMATED: 3 MMHG
RA WIDTH: 3.08 CM
RIGHT ATRIAL AREA: 11.1 CM2
RIGHT VENTRICLE DIASTOLIC BASEL DIMENSION: 2.9 CM
RV TB RVSP: 5 MMHG
RV TISSUE DOPPLER FREE WALL SYSTOLIC VELOCITY 1 (APICAL 4 CHAMBER VIEW): 11.74 CM/S
SINUS: 3.1 CM
STJ: 2.63 CM
TDI LATERAL: 0.11 M/S
TDI SEPTAL: 0.08 M/S
TDI: 0.1 M/S
TR MAX PG: 18 MMHG
TRICUSPID ANNULAR PLANE SYSTOLIC EXCURSION: 2.23 CM
TV PEAK GRADIENT: 17 MMHG
TV REST PULMONARY ARTERY PRESSURE: 21 MMHG
Z-SCORE OF LEFT VENTRICULAR DIMENSION IN END DIASTOLE: -1.14
Z-SCORE OF LEFT VENTRICULAR DIMENSION IN END SYSTOLE: 0.62

## 2025-02-18 PROCEDURE — 93306 TTE W/DOPPLER COMPLETE: CPT

## 2025-02-18 PROCEDURE — 93271 ECG/MONITORING AND ANALYSIS: CPT

## 2025-02-19 ENCOUNTER — PATIENT MESSAGE (OUTPATIENT)
Dept: ELECTROPHYSIOLOGY | Facility: CLINIC | Age: 45
End: 2025-02-19
Payer: MEDICARE

## 2025-02-19 ENCOUNTER — TELEPHONE (OUTPATIENT)
Dept: INTERNAL MEDICINE | Facility: CLINIC | Age: 45
End: 2025-02-19
Payer: MEDICARE

## 2025-02-19 DIAGNOSIS — R00.0 TACHYCARDIA: Primary | ICD-10-CM

## 2025-02-19 NOTE — TELEPHONE ENCOUNTER
----- Message from Soheila Baptiste sent at 2/19/2025 11:49 AM CST -----  Regarding: EKG Order  Please place and link an EKG order for the appointment scheduled on 1/7/25 thanks. Oliva [id number]

## 2025-02-20 ENCOUNTER — RESULTS FOLLOW-UP (OUTPATIENT)
Dept: INTERNAL MEDICINE | Facility: CLINIC | Age: 45
End: 2025-02-20
Payer: MEDICARE

## 2025-02-22 DIAGNOSIS — Z00.00 ENCOUNTER FOR MEDICARE ANNUAL WELLNESS EXAM: ICD-10-CM

## 2025-02-26 ENCOUNTER — OFFICE VISIT (OUTPATIENT)
Dept: FAMILY MEDICINE | Facility: CLINIC | Age: 45
End: 2025-02-26
Payer: MEDICARE

## 2025-02-26 VITALS
TEMPERATURE: 98 F | DIASTOLIC BLOOD PRESSURE: 76 MMHG | OXYGEN SATURATION: 97 % | SYSTOLIC BLOOD PRESSURE: 122 MMHG | BODY MASS INDEX: 39.92 KG/M2 | HEIGHT: 62 IN | HEART RATE: 54 BPM | WEIGHT: 216.94 LBS

## 2025-02-26 DIAGNOSIS — F41.9 ANXIETY: ICD-10-CM

## 2025-02-26 DIAGNOSIS — G43.009 MIGRAINE WITHOUT AURA AND WITHOUT STATUS MIGRAINOSUS, NOT INTRACTABLE: ICD-10-CM

## 2025-02-26 DIAGNOSIS — R79.9 ABNORMAL FINDING OF BLOOD CHEMISTRY, UNSPECIFIED: ICD-10-CM

## 2025-02-26 DIAGNOSIS — G62.9 NEUROPATHY: ICD-10-CM

## 2025-02-26 DIAGNOSIS — G47.00 INSOMNIA, UNSPECIFIED TYPE: ICD-10-CM

## 2025-02-26 DIAGNOSIS — I10 ESSENTIAL HYPERTENSION: Primary | ICD-10-CM

## 2025-02-26 DIAGNOSIS — E66.01 MORBID OBESITY: ICD-10-CM

## 2025-02-26 PROCEDURE — 99213 OFFICE O/P EST LOW 20 MIN: CPT | Mod: PBBFAC,PO | Performed by: FAMILY MEDICINE

## 2025-02-26 PROCEDURE — 99214 OFFICE O/P EST MOD 30 MIN: CPT | Mod: S$PBB,,, | Performed by: FAMILY MEDICINE

## 2025-02-26 PROCEDURE — 99999 PR PBB SHADOW E&M-EST. PATIENT-LVL III: CPT | Mod: PBBFAC,,, | Performed by: FAMILY MEDICINE

## 2025-02-26 RX ORDER — GABAPENTIN 300 MG/1
300 CAPSULE ORAL 3 TIMES DAILY
Qty: 270 CAPSULE | Refills: 3 | Status: SHIPPED | OUTPATIENT
Start: 2025-02-26

## 2025-02-26 RX ORDER — ALPRAZOLAM 0.5 MG/1
0.5 TABLET ORAL 2 TIMES DAILY
Qty: 60 TABLET | Refills: 5 | Status: SHIPPED | OUTPATIENT
Start: 2025-02-26

## 2025-02-26 RX ORDER — ECONAZOLE NITRATE 10 MG/G
CREAM TOPICAL
COMMUNITY

## 2025-02-26 RX ORDER — TOPIRAMATE 100 MG/1
100 TABLET, FILM COATED ORAL DAILY
Qty: 90 TABLET | Refills: 3 | Status: SHIPPED | OUTPATIENT
Start: 2025-02-26

## 2025-02-26 RX ORDER — ATENOLOL 25 MG/1
25 TABLET ORAL DAILY
Qty: 90 TABLET | Refills: 3 | Status: SHIPPED | OUTPATIENT
Start: 2025-02-26

## 2025-02-26 NOTE — PROGRESS NOTES
Subjective     Patient ID: Sarah Mukherjee is a 45 y.o. female.    Chief Complaint: Medication Refill    44 yo female with tachycardia, spinal stenosis with neurogenic bladder, chronic pain syndrome s/p spinal cord stimulator, morbid obesity here presents for follow-up. She is on a 30 day monitor and is wearing a 30 day heart monitor. She had a heart monitor.s he has seen the cardiologist. She is being evaluated for tachycardia. She drinks 2 cups of coffee per day.     Her  doesn't say she snores and she feels rested in the morning.         History of Present Illness               Past Medical History:   Diagnosis Date    Cancer     skin cancer removed    Crohn's disease     Degenerative joint disease of spine     Esophagitis     History of stomach ulcers     Lumbar facet arthropathy 6/20/2014    Neurogenic bladder       Past Surgical History:   Procedure Laterality Date    BREAST RECONSTRUCTION      CHOLECYSTECTOMY      HYSTERECTOMY      total hysterectomy in 20s due to fibroid, endometriosis    INJECTION OF ANESTHETIC AGENT AROUND NERVE Left 01/04/2019    Procedure: Block, Nerve MEDIAL BRANCH BLOCKS LEFT LUMBAR L5-S3;  Surgeon: Andra Burt MD;  Location: [facility];  Service: Pain Management;  Laterality: Left;  1 OF 2    INJECTION OF JOINT Left 06/28/2019    Procedure: INJECTION, JOINT COCCYX;  Surgeon: Andra Burt MD;  Location: [facility];  Service: Pain Management;  Laterality: Left;  INJECT JOINt & LEFT SIDE    PELVIC LAPAROSCOPY      TONSILLECTOMY       Family History   Problem Relation Name Age of Onset    Diabetes Mother Gabriela Kuhn     Hypertension Mother Gabriela Kuhn     Arthritis Mother Gabriela Kuhn     Atrial fibrillation Mother Gabriela Kuhn     No Known Problems Father      Diabetes Maternal Grandmother Ara Oconnell     Heart disease Maternal Grandmother Ara Oconnell     Heart disease Paternal Grandmother Arely Kuhn     Heart disease Paternal Grandfather  "Artur Kuhn Sr     Breast cancer Maternal Aunt  47    Ovarian cancer Maternal Aunt      Cancer Maternal Uncle Brian Oconnell      Social History[1]    Review of Systems         Objective     Vitals:    02/26/25 0830   BP: 122/76   Pulse: (!) 54   Temp: 98 °F (36.7 °C)   TempSrc: Oral   SpO2: 97%   Weight: 98.4 kg (216 lb 14.9 oz)   Height: 5' 2" (1.575 m)        Physical Exam  Constitutional:       General: She is not in acute distress.     Appearance: She is well-developed. She is not ill-appearing, toxic-appearing or diaphoretic.   HENT:      Head: Normocephalic and atraumatic.   Eyes:      Conjunctiva/sclera: Conjunctivae normal.   Neck:      Vascular: No carotid bruit.   Cardiovascular:      Rate and Rhythm: Normal rate and regular rhythm.      Heart sounds: Normal heart sounds. No murmur heard.     No friction rub. No gallop.   Pulmonary:      Effort: Pulmonary effort is normal. No respiratory distress.      Breath sounds: Normal breath sounds. No stridor. No wheezing, rhonchi or rales.   Abdominal:      General: Abdomen is flat. Bowel sounds are normal. There is no distension.      Palpations: Abdomen is soft. There is no mass.      Tenderness: There is no abdominal tenderness. There is no guarding or rebound.      Hernia: No hernia is present.   Musculoskeletal:      Cervical back: Normal range of motion and neck supple.   Neurological:      General: No focal deficit present.      Mental Status: She is alert and oriented to person, place, and time.   Psychiatric:         Mood and Affect: Mood normal.         Behavior: Behavior normal.       Physical Exam                Assessment and Plan     1. Essential hypertension  -     CBC Auto Differential; Future; Expected date: 02/26/2025  -     Comprehensive Metabolic Panel; Future; Expected date: 02/26/2025  -     Lipid Panel; Future; Expected date: 02/26/2025  -     TSH; Future; Expected date: 02/26/2025  -     Hemoglobin A1C; Future; Expected date: " 02/26/2025  -     atenoloL (TENORMIN) 25 MG tablet; Take 1 tablet (25 mg total) by mouth once daily.  Dispense: 90 tablet; Refill: 3  Stable. Refilled meds and due for labs    2. Insomnia, unspecified type  Stable and refilled ambien    3. Anxiety  -     ALPRAZolam (XANAX) 0.5 MG tablet; Take 1 tablet (0.5 mg total) by mouth 2 (two) times daily.  Dispense: 60 tablet; Refill: 5  Stable. Refilled meds.     4. Morbid obesity  -     Hemoglobin A1C; Future; Expected date: 02/26/2025    5. Migraine without aura and without status migrainosus, not intractable  -     topiramate (TOPAMAX) 100 MG tablet; Take 1 tablet (100 mg total) by mouth once daily.  Dispense: 90 tablet; Refill: 3  Stable. Refilled meds.     6. Neuropathy  -     gabapentin (NEURONTIN) 300 MG capsule; Take 1 capsule (300 mg total) by mouth 3 (three) times daily.  Dispense: 270 capsule; Refill: 3  Stable. Refilled meds.     7. Abnormal finding of blood chemistry, unspecified  -     Hemoglobin A1C; Future; Expected date: 02/26/2025      Sarah was seen today for medication refill.    Diagnoses and all orders for this visit:    Essential hypertension  -     CBC Auto Differential; Future  -     Comprehensive Metabolic Panel; Future  -     Lipid Panel; Future  -     TSH; Future  -     Hemoglobin A1C; Future  -     atenoloL (TENORMIN) 25 MG tablet; Take 1 tablet (25 mg total) by mouth once daily.    Insomnia, unspecified type    Anxiety  -     ALPRAZolam (XANAX) 0.5 MG tablet; Take 1 tablet (0.5 mg total) by mouth 2 (two) times daily.    Morbid obesity  -     Hemoglobin A1C; Future    Migraine without aura and without status migrainosus, not intractable  -     topiramate (TOPAMAX) 100 MG tablet; Take 1 tablet (100 mg total) by mouth once daily.    Neuropathy  -     gabapentin (NEURONTIN) 300 MG capsule; Take 1 capsule (300 mg total) by mouth 3 (three) times daily.    Abnormal finding of blood chemistry, unspecified  -     Hemoglobin A1C; Future        Assessment  & Plan                      No follow-ups on file.        This note was generated with the assistance of ambient listening technology. Verbal consent was obtained by the patient and accompanying visitor(s) for the recording of patient appointment to facilitate this note. I attest to having reviewed and edited the generated note for accuracy, though some syntax or spelling errors may persist. Please contact the author of this note for any clarification.         [1]   Social History  Socioeconomic History    Marital status:    Tobacco Use    Smoking status: Never     Passive exposure: Never    Smokeless tobacco: Never   Substance and Sexual Activity    Alcohol use: Not Currently     Comment: occ    Drug use: No    Sexual activity: Yes     Partners: Male     Birth control/protection: None     Comment:  for 19 years to the same man     Social Drivers of Health     Financial Resource Strain: Low Risk  (1/7/2025)    Overall Financial Resource Strain (CARDIA)     Difficulty of Paying Living Expenses: Not hard at all   Food Insecurity: No Food Insecurity (1/7/2025)    Hunger Vital Sign     Worried About Running Out of Food in the Last Year: Never true     Ran Out of Food in the Last Year: Never true   Transportation Needs: No Transportation Needs (7/16/2024)    [facility] from [facility] - [facility]     Lack of Transportation (Medical): No     Lack of Transportation (Non-Medical): No   Physical Activity: Unknown (1/7/2025)    Exercise Vital Sign     Days of Exercise per Week: 3 days   Stress: No Stress Concern Present (1/7/2025)    [employer] - Occupational Stress Questionnaire     Feeling of Stress : Not at all   Housing Stability: Low Risk  (12/26/2023)    Housing Stability Vital Sign     Unable to Pay for Housing in the Last Year: No     Number of Places Lived in the Last Year: 1     Unstable Housing in the Last Year: No

## 2025-03-03 DIAGNOSIS — G47.00 INSOMNIA, UNSPECIFIED TYPE: ICD-10-CM

## 2025-03-03 NOTE — TELEPHONE ENCOUNTER
No care due was identified.  Health [facility] Embedded Care Due Messages. Reference number: [id number].   3/03/2025 5:08:09 PM CST

## 2025-03-04 ENCOUNTER — PATIENT MESSAGE (OUTPATIENT)
Dept: FAMILY MEDICINE | Facility: CLINIC | Age: 45
End: 2025-03-04
Payer: MEDICARE

## 2025-03-05 RX ORDER — ZOLPIDEM TARTRATE 10 MG/1
10 TABLET ORAL NIGHTLY
Qty: 30 TABLET | Refills: 5 | Status: SHIPPED | OUTPATIENT
Start: 2025-03-05

## 2025-03-10 ENCOUNTER — RESULTS FOLLOW-UP (OUTPATIENT)
Dept: FAMILY MEDICINE | Facility: CLINIC | Age: 45
End: 2025-03-10

## 2025-03-26 ENCOUNTER — TELEPHONE (OUTPATIENT)
Dept: ELECTROPHYSIOLOGY | Facility: CLINIC | Age: 45
End: 2025-03-26
Payer: MEDICARE

## 2025-03-26 ENCOUNTER — RESULTS FOLLOW-UP (OUTPATIENT)
Dept: ELECTROPHYSIOLOGY | Facility: CLINIC | Age: 45
End: 2025-03-26

## 2025-03-26 NOTE — TELEPHONE ENCOUNTER
----- Message from Sven Andrew MD sent at 3/26/2025  7:35 AM CDT -----  Monitor revealed no significant arrhythmias. Her symptoms correlated with nsr, nsr with PAC's, nsr with PVC's, and 1 9 beat run of nonsustained AT.  Please relay to patient.  Can f/u PRN with EP  ----- Message -----  From: Sven Andrew MD  Sent: 3/26/2025   7:30 AM CDT  To: Sven Andrew MD

## 2025-03-26 NOTE — TELEPHONE ENCOUNTER
Spoke with patient and relayed results/recommendations, per Dr Andrew's note:    Monitor revealed no significant arrhythmias. Her symptoms correlated with nsr, nsr with PAC's, nsr with PVC's, and 1 9 beat run of nonsustained AT.  Please relay to patient.  Can f/u PRN with EP    She verbalized understanding and was appreciative of the call.

## 2025-04-03 ENCOUNTER — PATIENT MESSAGE (OUTPATIENT)
Dept: FAMILY MEDICINE | Facility: CLINIC | Age: 45
End: 2025-04-03
Payer: MEDICARE

## 2025-04-08 ENCOUNTER — PATIENT MESSAGE (OUTPATIENT)
Dept: BARIATRICS | Facility: CLINIC | Age: 45
End: 2025-04-08
Payer: MEDICARE

## 2025-04-16 ENCOUNTER — OFFICE VISIT (OUTPATIENT)
Dept: BARIATRICS | Facility: CLINIC | Age: 45
End: 2025-04-16
Payer: MEDICARE

## 2025-04-16 ENCOUNTER — CLINICAL SUPPORT (OUTPATIENT)
Dept: BARIATRICS | Facility: CLINIC | Age: 45
End: 2025-04-16
Payer: MEDICARE

## 2025-04-16 VITALS
HEART RATE: 63 BPM | DIASTOLIC BLOOD PRESSURE: 72 MMHG | WEIGHT: 217.38 LBS | HEIGHT: 62 IN | SYSTOLIC BLOOD PRESSURE: 132 MMHG | OXYGEN SATURATION: 94 % | BODY MASS INDEX: 40 KG/M2

## 2025-04-16 DIAGNOSIS — D53.9 NUTRITIONAL ANEMIA, UNSPECIFIED: ICD-10-CM

## 2025-04-16 DIAGNOSIS — N31.9 NEUROGENIC BLADDER: ICD-10-CM

## 2025-04-16 DIAGNOSIS — E66.01 MORBID OBESITY: ICD-10-CM

## 2025-04-16 DIAGNOSIS — E66.9 OBESITY (BMI 30-39.9): ICD-10-CM

## 2025-04-16 DIAGNOSIS — R79.9 ABNORMAL FINDING OF BLOOD CHEMISTRY, UNSPECIFIED: ICD-10-CM

## 2025-04-16 DIAGNOSIS — Z71.3 DIETARY COUNSELING: Primary | ICD-10-CM

## 2025-04-16 DIAGNOSIS — F33.41 RECURRENT MAJOR DEPRESSIVE DISORDER, IN PARTIAL REMISSION: ICD-10-CM

## 2025-04-16 PROCEDURE — 97802 MEDICAL NUTRITION INDIV IN: CPT | Mod: PBBFAC | Performed by: DIETITIAN, REGISTERED

## 2025-04-16 PROCEDURE — 99999 PR PBB SHADOW E&M-EST. PATIENT-LVL IV: CPT | Mod: PBBFAC,,, | Performed by: PHYSICIAN ASSISTANT

## 2025-04-16 PROCEDURE — 99214 OFFICE O/P EST MOD 30 MIN: CPT | Mod: PBBFAC | Performed by: PHYSICIAN ASSISTANT

## 2025-04-16 PROCEDURE — 99999PBSHW PR PBB SHADOW TECHNICAL ONLY FILED TO HB: Mod: PBBFAC,,,

## 2025-04-16 RX ORDER — HYDROCODONE BITARTRATE AND ACETAMINOPHEN 7.5; 325 MG/1; MG/1
1 TABLET ORAL
COMMUNITY
Start: 2025-04-14

## 2025-04-16 NOTE — PROGRESS NOTES
BARIATRIC NEW PATIENT EVALUATION    CHIEF COMPLAINT:   Morbid obesity, body mass index is 39.76 kg/m². and inability to maintain weight loss and lose weight    HPI:  Sarah Mukherjee is a 45 y.o. morbidly obese female. Her current body mass index is 39.76 kg/m². She has multiple associated comorbidities including essential hypertension, anxiety, and osteoarthritis.  She has struggled with excess weight since her twenties after her hysterectomy.  Her highest adult weight was 230 lbs at age 40, and her lowest adult weight was 170lbs at age 20-20.  The patient has tried Weight Watchers, low-carb diet, phentermine (Adipex-P), and portion control .  The patient was most successful with adipex and small portions with a weight loss of 30+lbs.  Her current exercise includes none 0 times a week. She denies any history of eating disorder such as anorexia, bulimia, or taking laxatives for weight loss, and denies any addiction including illicit substances, alcohol, or gambling.  Patient states she has a good  support system.  She lives with family.  She is disabled.  She  denies a history of GERD. Pt states that she has had an non-bleeding ulcer before around 10 years ago, states that she took medication for three months and she had an EGD to recheck which it had resolved. Does not have any symptoms ( denies abdominal pain nausea vomiting heartburn or dyspepsia like symptoms)  The patient's goal is increase activities of daily living. Would like to get out of the 200 lbs. .    ESS: Score of 2, reviewed 2025.  Does not need Sleep Study.      EK/25  Normal sinus rhythm with Short AZ interval   Low voltage, precordial leads   Baseline wander   Borderline Abnormal ECG   When compared with ECG of 2023 07:27,   Junctional rhythm has replaced Sinus rhythm     Echo: 2025    Regular rythm HR 60s    Left Ventricle: The left ventricle is normal in size. Normal wall thickness. Normal wall motion. There is normal  "systolic function with a visually estimated ejection fraction of 60 - 65%. There is normal diastolic function.    Right Ventricle: Normal right ventricular cavity size. Wall thickness is normal. Systolic function is normal.    Left Atrium: Normal left atrial size.    Right Atrium: Normal right atrial size.    Aortic Valve: The aortic valve is a trileaflet valve. There is mild aortic valve sclerosis. There is mild annular calcification present.    Aorta: Aortic root is normal in size measuring 3.10 cm. Ascending aorta is normal measuring 3.05 cm.    Pulmonary Artery: The estimated pulmonary artery systolic pressure is 21 mmHg.    IVC/SVC: Normal venous pressure at 3 mmHg.    Pericardium: There is no pericardial effusion.     Cards notes 2/2025  " Dr Andrew asked me to let you know that he reviewed your echocardiogram and it showed normal heart function and normal heart structure. There was noted mild aortic sclerosis (thickening of the aortic valve without any narrowing) and he wants to make sure you repeat the echo in about 3 years just to keep an eye on this.  Please do not hesitate to reach out for any other questions or concerns.  Thanks,   Mary Ziegler RN"  PAST MEDICAL HISTORY:  Past Medical History:   Diagnosis Date    Cancer     skin cancer removed    Crohn's disease     Degenerative joint disease of spine     Esophagitis     History of stomach ulcers     Lumbar facet arthropathy 6/20/2014    Neurogenic bladder        PAST SURGICAL HISTORY:  Past Surgical History:   Procedure Laterality Date    BREAST RECONSTRUCTION      CHOLECYSTECTOMY      HYSTERECTOMY      total hysterectomy in 20s due to fibroid, endometriosis    INJECTION OF ANESTHETIC AGENT AROUND NERVE Left 01/04/2019    Procedure: Block, Nerve MEDIAL BRANCH BLOCKS LEFT LUMBAR L5-S3;  Surgeon: Andra Burt MD;  Location: [facility];  Service: Pain Management;  Laterality: Left;  1 OF 2    INJECTION OF JOINT Left 06/28/2019    Procedure: " INJECTION, JOINT COCCYX;  Surgeon: Andra Burt MD;  [facility];  Service: Pain Management;  Laterality: Left;  INJECT JOINt & LEFT SIDE    PELVIC LAPAROSCOPY      TONSILLECTOMY         FAMILY HISTORY:  Family History   Problem Relation Name Age of Onset    Diabetes Mother Gabriela Kuhn     Hypertension Mother Gabriela Kuhn     Arthritis Mother Gabriela Kuhn     Atrial fibrillation Mother Gabriela Kuhn     No Known Problems Father      Diabetes Maternal Grandmother Ara Oconnell     Heart disease Maternal Grandmother Ara Oconnell     Heart disease Paternal Grandmother Arely Kuhn     Heart disease Paternal Grandfather Artur Kuhn Sr     Breast cancer Maternal Aunt  47    Ovarian cancer Maternal Aunt      Cancer Maternal Uncle Brian Oconnell         SOCIAL HISTORY:  Social History     Socioeconomic History    Marital status:    Tobacco Use    Smoking status: Never     Passive exposure: Never    Smokeless tobacco: Never   Substance and Sexual Activity    Alcohol use: Not Currently     Comment: occ    Drug use: No    Sexual activity: Yes     Partners: Male     Birth control/protection: None     Comment:  for 19 years to the same man     Social Drivers of Health     Financial Resource Strain: Low Risk  (1/7/2025)    Overall Financial Resource Strain (CARDIA)     Difficulty of Paying Living Expenses: Not hard at all   Food Insecurity: No Food Insecurity (1/7/2025)    Hunger Vital Sign     Worried About Running Out of Food in the Last Year: Never true     Ran Out of Food in the Last Year: Never true   Transportation Needs: No Transportation Needs (7/16/2024)    Received from [facility]    [facility] - Transportation     Lack of Transportation (Medical): No     Lack of Transportation (Non-Medical): No   Physical Activity: Unknown (1/7/2025)    Exercise Vital Sign     Days of Exercise per Week: 3 days   Stress: No Stress Concern Present (1/7/2025)    [employer]  "Occupational Health - Occupational Stress Questionnaire     Feeling of Stress : Not at all   Housing Stability: Low Risk  (12/26/2023)    Housing Stability Vital Sign     Unable to Pay for Housing in the Last Year: No     Number of Places Lived in the Last Year: 1     Unstable Housing in the Last Year: No       MEDICATIONS:  Current Medications[1]    ALLERGIES:  Review of patient's allergies indicates:   Allergen Reactions    Penicillins Hives    Fioricet [butalbital-acetaminophen-caff]      palpitations       Review of Systems   Constitutional:  Negative for chills and fever.   HENT:  Negative for sore throat and tinnitus.    Eyes:  Negative for blurred vision and double vision.   Respiratory:  Negative for cough and shortness of breath.    Cardiovascular:  Negative for chest pain, palpitations and leg swelling.   Gastrointestinal:  Negative for abdominal pain, constipation, diarrhea, heartburn, nausea and vomiting.   Genitourinary:  Negative for dysuria and hematuria.   Musculoskeletal:  Positive for back pain, joint pain and neck pain. Negative for falls and myalgias.   Skin:  Negative for rash.   Neurological:  Negative for dizziness, tingling and headaches.   Endo/Heme/Allergies:  Does not bruise/bleed easily.   Psychiatric/Behavioral:  Negative for depression and suicidal ideas. The patient is not nervous/anxious.        Vitals:    04/16/25 0928   BP: 132/72   Pulse: 63   SpO2: (!) 94%   Weight: 98.6 kg (217 lb 6 oz)   Height: 5' 2" (1.575 m)   PainSc: 0-No pain       Physical Exam  Vitals reviewed.   Constitutional:       Appearance: She is obese.   HENT:      Head: Normocephalic and atraumatic.   Eyes:      Extraocular Movements: Extraocular movements intact.      Conjunctiva/sclera: Conjunctivae normal.      Pupils: Pupils are equal, round, and reactive to light.   Cardiovascular:      Rate and Rhythm: Normal rate and regular rhythm.      Pulses: Normal pulses.      Heart sounds: Normal heart sounds. "   Pulmonary:      Effort: Pulmonary effort is normal. No respiratory distress.      Breath sounds: Normal breath sounds.   Abdominal:      General: Bowel sounds are normal.      Palpations: Abdomen is soft.      Tenderness: There is no abdominal tenderness.   Musculoskeletal:         General: No swelling. Normal range of motion.      Cervical back: Normal range of motion and neck supple.   Skin:     General: Skin is warm and dry.   Neurological:      General: No focal deficit present.      Mental Status: She is alert and oriented to person, place, and time.   Psychiatric:         Mood and Affect: Mood normal.         Behavior: Behavior normal.         Thought Content: Thought content normal.         Judgment: Judgment normal.          DIAGNOSIS:  1. Morbid obesity, body mass index is 39.76 kg/m². and inability to maintain weight loss and lose weight  2. Co-morbidities: essential hypertension, anxiety, and osteoarthritis    PLAN:  The patient is a good candidate for Bariatric Surgery. The patient is interested in laparoscopic sleeve gastrectomy with Dr Soares. The surgery and post-op care was discussed in detail with the patient. All questions were answered.    The patient understands that bariatric surgery is a tool to aid in weight loss and that they need to be committed to the diet and exercise post-operatively for successful weight loss. Discussed with patient that bariatric surgery is not the easy way out and that it will take plenty of dedication on the patient's part to be successful. Also discussed the possibility of weight regain if the patient strays from the diet guidelines or exercise requirements. Patient verbalized understanding and wishes to proceed with the work-up.    Estimated Goal weight is 50% EW    ORDERS:  1. Chest X-Ray and EKG  2. Psychological Consult and Bariatric Dietician Consult  3. Bariatric Labs: Per orders.  4. No NSAIDS after surgery due to increased risk of stomach ulcers. Talk to  your prescribing provider about alternative options for your pain.      PCP: Staci Sorenson MD  RTC: As scheduled.         [1]   Current Outpatient Medications:     ALPRAZolam (XANAX) 0.5 MG tablet, Take 1 tablet (0.5 mg total) by mouth 2 (two) times daily., Disp: 60 tablet, Rfl: 5    atenoloL (TENORMIN) 25 MG tablet, Take 1 tablet (25 mg total) by mouth once daily., Disp: 90 tablet, Rfl: 3    gabapentin (NEURONTIN) 300 MG capsule, TAKE 1 CAPSULE BY MOUTH THREE TIMES DAILY, Disp: 90 capsule, Rfl: 0    gabapentin (NEURONTIN) 300 MG capsule, Take 1 capsule (300 mg total) by mouth 3 (three) times daily., Disp: 270 capsule, Rfl: 3    HYDROcodone-acetaminophen (NORCO) 7.5-325 mg per tablet, Take 1 tablet by mouth., Disp: , Rfl:     topiramate (TOPAMAX) 100 MG tablet, Take 1 tablet (100 mg total) by mouth once daily., Disp: 90 tablet, Rfl: 3    zolpidem (AMBIEN) 10 mg Tab, Take 1 tablet (10 mg total) by mouth every evening., Disp: 30 tablet, Rfl: 5    econazole nitrate 1 % cream, APPLY TO THE AFFECTED AND SURROUNDING AREAS OF SKIN BY TOPICAL ROUTE DAILY (Patient not taking: Reported on 4/16/2025), Disp: , Rfl:

## 2025-04-16 NOTE — PATIENT INSTRUCTIONS
Prior to surgery you will need to complete:  - Dietitian consult and follow up appointments as needed  - Labs  - Chest X-ray  - EKG  - Psychological evaluation, Please call psychiatry [phone] to schedule    In preparation for bariatric surgery, please complete the following:   Discuss your current medications with your primary care provider, remember your medications will need to be crushed, chewable, or in liquid form for the first 3-6 months after a gastric bypass or sleeve.  For a gastric band, your medications will need to be crushed indefinitely.    If you take a blood thinner such as: Coumadin (warfarin), Pradaxa (dabigatran), or Plavix (clopidogrel), you will need to speak with your prescribing provider on how or if this medication can be stopped before surgery.   If you take a medication for depression or anxiety, you will need to begin crushing or opening the capsule 1-3 months prior to surgery.  Remember to discuss this with the psychologist or psychiatrist that you see.   If you take medication for arthritis on a daily basis that is considered a non-steroidal anti-inflammatory (NSAID), please discuss with your prescribing physician an alternative medication.  After having gastric bypass or gastric sleeve, this group of medications is not appropriate to take due to increased risk of bleeding stomach ulcers.      DEFINITIONS  Appointments: Pre-scheduled meetings or consultations with any physician, advanced practice provider, dietitian, or psychologist, and labs, imaging studies, sleep studies, etc.   Late cancellation: Cancelling an appointment 24-48 hours prior to scheduled time.  No-Show appointment:  is when   You do NOT arrive to your appointment at the time its scheduled.  You call to cancel or cancel via [employer] less than 24 hours in advance of your scheduled appointment  You show up 15 minutes AFTER your scheduled appointment time without any notification of being late.     POLICY  You are  allowed up to 3 cancellations for appointments.   After 3 cancellations your case will be placed on hold for 2 months and after that time you can resume the program.   You are allowed only 1 no-show for an appointment.   You will be re-scheduled one time and if there is a 2nd no-show at any point, your case will be placed on hold for 3 months.  After 3 months you can resume the program.     Upon resuming the program after being placed on hold for either above mentioned reasons, if you have a late cancel or no show for any appointment, the bariatric team will review if youre an appropriate candidate for surgery at the monthly meeting.

## 2025-04-16 NOTE — PROGRESS NOTES
NUTRITIONAL CONSULT    Referring Physician: Diane Soares M.D.   Reason for MNT Referral: Initial assessment for sleeve gastrectomy work-up    PAST MEDICAL HISTORY:   45 y.o. female    Weight history includes She has struggled with excess weight since her twenties after her hysterectomy.  Her highest adult weight was 230 lbs at age 40, and her lowest adult weight was 170 lbs at age 20-21.  The patient has tried Weight Watchers (only 5 lbs wt loss), low-carb diet, phentermine (Adipex-P), and portion control.  The patient was most successful with Adipex and small portions with a weight loss of 30+lbs, but gained it back.    Past Medical History:   Diagnosis Date    Allergy     Anxiety     Arthritis     Crohn's disease     Degenerative joint disease of spine     Esophagitis     History of stomach ulcers     Hypertension     Lumbar facet arthropathy 06/20/2014    Neurogenic bladder     Neuromuscular disorder      CLINICAL DATA:  45 y.o.-year-old White female.  Height: 5 ft. 2 in.  Weight: 217 lbs  IBW: 132 lbs  BMI: 39.7  The patient's goal weight (50% EBW): 175 lbs  Personal goal weight: under 200 lbs    Goal for Bariatric Surgery: to improve health, to improve quality of life, to lose weight, and to prevent future medical conditions    DAILY NUTRITIONAL NEEDS: pre-op nutritional bariatric guidelines to promote weight loss  7796-9881 Calories    Grams Protein    NUTRITION & HEALTH HISTORY:  Greatest challenge: starchy CHO and irregular meal patterns    Current diet recall:     - coffee with sweetened creamer  B: none  - fruit and a pinch of shredded parmesan cheese  L: none  - coffee with sweetened creamer  - nuts or meat stick or fruit  D: (small bowl) baked chicken, potatoes, yellow and green squash with olive oil roasted in foil pouch OR jose hair pasta with chicken/shrimp and zacarias sauce OR beans and rice OR meat stick and anish pb cup    Current Diet:  Meal pattern: 1 meal + 0-2 snacks  Protein  supplements: None  Snacking: nuts, meat stick, sweets  Vegetables: Likes a variety. No brocc/cauli. Eats 2-3 times per week.  Fruits: Likes a variety. Eats 2-3 times per week.  Beverages: water and coffee with sugar  Dining out/delivery: Weekly. Monthly. Mostly restaurants.  Cooking at home: Daily. Weekly. Mostly baked/roast, grilled/broiled, boiled, smothered, braised/stewed, pan-fried/sauteed, and steamed beef, fish/seafood, pork, chicken/turkey, starchy CHO, vegetables, and beans  Doesn't keep bread in the house. Doesn't really like fish.    Exercise:  Past exercise: off and on. Walking 30 min - 2 miles.    Current exercise: walking 1 block occ    Restrictions to Exercise: back pain    Vitamins / Minerals / Herbs:   Vit D gummies    Labs:   None available at time of visit    Food Allergies:   None known    Social:  No work.  Lives with her .  Grocery shopping and food prep done by the pt.  Patient believes the household will be supportive after surgery.  Alcohol: None.  Smoking: None.    ASSESSMENT:  Patient reports attempts at weight loss, only to regain lost weight.  Patient demonstrated knowledge of healthy eating behaviors and exercise patterns; admits to not eating healthy and not exercising at this point.  Patient states willingness to change lifestyle and make behavior modifications.    Barriers to Education: none    Stage of change: determination    NUTRITION DIAGNOSIS:    Obesity related to Physical inactivity as evidence by BMI.    BARIATRIC DIET DISCUSSION/PLAN:  Discussed diet after surgery and related to patient's food record.  Reviewed nutrition guidelines for before and after surgery.  Answered all questions.  Continue to review Bariatric Nutrition Guidebook at home and call with any questions.  Work on Bariatric Nutrition Checklist.  Work on expanding variety of vegetables.  Work on gradually cutting back on starchy CHO in the diet.  Begin trying various protein supplements to determine  "preference  Start including protein supplements in the diet plan daily.  1200-calorie diet.  1500-calorie diet.  5-6 meals per day  Increase exercise  increase protein  Work on cutting out sugar-sweetened beverages  Work on cutting out "junk foods"    Suggestion:  - Protein shake + iced coffee for breakfast and lunch  - main meal lean protein and more veggies  - Try [employer]/Atkins pb cups for occ sweet treat    RECOMMENDATIONS:  Pt is a potential candidate for bariatric surgery and needs additional medically supervised diet counseling and surveillance  Follow up in one month.     Patient verbalized understanding.    Communicated nutrition plan with bariatric team.    SESSION TIME:  60 minutes      "

## 2025-04-22 ENCOUNTER — PATIENT MESSAGE (OUTPATIENT)
Dept: PSYCHIATRY | Facility: CLINIC | Age: 45
End: 2025-04-22
Payer: MEDICARE

## 2025-05-13 ENCOUNTER — PATIENT MESSAGE (OUTPATIENT)
Dept: BARIATRICS | Facility: CLINIC | Age: 45
End: 2025-05-13
Payer: MEDICARE

## 2025-05-14 ENCOUNTER — CLINICAL SUPPORT (OUTPATIENT)
Dept: PSYCHIATRY | Facility: CLINIC | Age: 45
End: 2025-05-14
Payer: MEDICARE

## 2025-05-14 DIAGNOSIS — Z00.8 ENCOUNTER FOR PRE-SURGICAL PSYCHOLOGICAL ASSESSMENT: Primary | ICD-10-CM

## 2025-05-19 ENCOUNTER — CLINICAL SUPPORT (OUTPATIENT)
Dept: BARIATRICS | Facility: CLINIC | Age: 45
End: 2025-05-19
Payer: MEDICARE

## 2025-05-19 DIAGNOSIS — E66.9 OBESITY (BMI 30-39.9): ICD-10-CM

## 2025-05-19 DIAGNOSIS — Z71.3 DIETARY COUNSELING: Primary | ICD-10-CM

## 2025-05-19 PROCEDURE — 97803 MED NUTRITION INDIV SUBSEQ: CPT | Mod: 95,GZ,, | Performed by: DIETITIAN, REGISTERED

## 2025-05-19 NOTE — PROGRESS NOTES
The patient location is: home ([place])  The chief complaint leading to consultation is: obesity    Visit type: audiovisual    Face to Face time with patient: 30 min  30 minutes of total time spent on the encounter, which includes face to face time and non-face to face time preparing to see the patient (eg, review of tests), Obtaining and/or reviewing separately obtained history, Documenting clinical information in the electronic or other health record, Independently interpreting results (not separately reported) and communicating results to the patient/family/caregiver, or Care coordination (not separately reported).         Each patient to whom he or she provides medical services by telemedicine is:  (1) informed of the relationship between the physician and patient and the respective role of any other health care provider with respect to management of the patient; and (2) notified that he or she may decline to receive medical services by telemedicine and may withdraw from such care at any time.        NUTRITIONAL Note     Referring Physician: Diane Soares M.D.   Reason for MNT Referral: Follow up assessment for sleeve gastrectomy work-up     45 y.o. female presents for follow up with 6 lbs weight loss over the past month. States she only gained 1 lb while on her cruise and then the past 2 weeks she has been on a modified liquid diet and has been losing weight since.          Past Medical History:   Diagnosis Date    Allergy      Anxiety      Arthritis      Crohn's disease      Degenerative joint disease of spine      Esophagitis      History of stomach ulcers      Hypertension      Lumbar facet arthropathy 06/20/2014    Neurogenic bladder      Neuromuscular disorder        CLINICAL DATA:  45 y.o.-year-old White female.  Height: 5 ft. 2 in.  Weight: 211 lbs  IBW: 132 lbs  BMI: 38.5 (from 39.7)     DAILY NUTRITIONAL NEEDS: pre-op nutritional bariatric guidelines to promote weight loss  [id number] Calories     Grams Protein     NUTRITION & HEALTH HISTORY:  Greatest challenge: starchy CHO and irregular meal patterns     Current diet recall:      B: Sarai shake  L 1-3pm: Fairlife shake  - 1oz cheese and fruit  D: none OR 1/3 cup red beans with ham OR grilled zucchini with Juan Manuel's seasoning OR a few shrimp and sauteed shredded cabbage     Current Diet:  Meal pattern: 1 meal + 1-2 snacks + 2 protein supplements  Protein supplements: Fairlife shakes  Snacking: nuts, cheese, fruits  Vegetables: Likes a variety. No brocc/cauli. Eats almost daily.  Fruits: Likes a variety. Eats  daily.  Beverages: water/CL  Dining out/delivery: Weekly. Monthly. Mostly restaurants.  Cooking at home: Daily. Weekly. Mostly baked/roast, grilled/broiled, boiled, smothered, braised/stewed, pan-fried/sauteed, and steamed beef, fish/seafood, pork, chicken/turkey, (limited starchy CHO), vegetables, and beans  Doesn't keep bread in the house. Doesn't really like fish.     Exercise:  Brisk walk in the neighborhood  Lots of walking while on the cruise     Restrictions to Exercise: back pain     Vitamins / Minerals / Herbs:   B12 gummies  Benefiber     Labs:   reviewed     Food Allergies:   None known     Social:  No work.  Lives with her .  Grocery shopping and food prep done by the pt.  Patient believes the household will be supportive after surgery.  Alcohol: None.  Smoking: None.     ASSESSMENT:  Patient demonstrated knowledge of healthy eating behaviors and exercise patterns.  Patient demonstrates willingness to change lifestyle and make behavior modifications AEB weight loss, dietary changes, protein supplements and exercise.     Barriers to Education: none     Stage of change: action     NUTRITION DIAGNOSIS:    Obesity related to Physical inactivity as evidence by BMI.     BARIATRIC DIET DISCUSSION/PLAN:  Discussed diet after surgery and related to patient's food record.  Reviewed nutrition guidelines for before and after  surgery.  Answered all questions.  Continue to review Bariatric Nutrition Guidebook at home and call with any questions.  Work on Bariatric Nutrition Checklist.  1200-calorie diet.  1500-calorie diet.  5-6 meals per day  Maintain/Increase exercise     RECOMMENDATIONS:  Pt is a potential candidate for bariatric surgery and needs additional medically supervised diet counseling and surveillance  Follow up in one month.      Patient verbalized understanding.     Communicated nutrition plan with bariatric team.     SESSION TIME:  30 minutes

## 2025-05-20 ENCOUNTER — OFFICE VISIT (OUTPATIENT)
Dept: PSYCHIATRY | Facility: CLINIC | Age: 45
End: 2025-05-20
Payer: MEDICARE

## 2025-05-20 DIAGNOSIS — E66.01 MORBID OBESITY: ICD-10-CM

## 2025-05-20 DIAGNOSIS — Z01.818 PREOPERATIVE EVALUATION TO RULE OUT SURGICAL CONTRAINDICATION: Primary | ICD-10-CM

## 2025-05-20 DIAGNOSIS — I10 ESSENTIAL HYPERTENSION: ICD-10-CM

## 2025-05-20 NOTE — PROGRESS NOTES
PRESURGICAL PSYCHOLOGICAL EVALUATION - BARIATRICS   Psychiatry Initial Visit (PhD/PsyD)    Psychological Intake and Assessment      Site:  The patient location is: home ([place])  The chief complaint leading to consultation is: presurgical eval    Visit type: audiovisual    Face to Face time with patient: 37  80 minutes of total time spent on the encounter, which includes face to face time and non-face to face time preparing to see the patient (eg, review of tests), Obtaining and/or reviewing separately obtained history, Documenting clinical information in the electronic or other health record, Independently interpreting results (not separately reported) and communicating results to the patient/family/caregiver, or Care coordination (not separately reported).         Each patient to whom he or she provides medical services by telemedicine is:  (1) informed of the relationship between the physician and patient and the respective role of any other health care provider with respect to management of the patient; and (2) notified that he or she may decline to receive medical services by telemedicine and may withdraw from such care at any time.    Notes:       CPT Codes:   MMPI ONLY:   74922 (1 hour): Psychiatric Diagnostic Evaluation   44882 (1 hour): Integration of patient data, interpretation of standardized test results and clinical data, clinical decision-making, treatment planning and report, and interactive feedback to the patient   36910 (1 hour): Psychological or neuropsychological test administration, with single automated instrument via electronic platform, with automated result only: ?[place] Multiphasic Personality Inventory -?3  (MMPI-3)         NAME:  Sarah Mukherjee    MRN: [id number]      :  1980     Date:  2025    Referral source: Diane Soares M.D.     Clinical status of patient: Outpatient      Met With: Patient      Chief complaint/reason for encounter: Routine psychological evaluation  "prior to bariatric surgery.       Before this evaluation was initiated, the purposes and process of the assessment and the limits of confidentiality were discussed with the patient who expressed understanding of these issues and verbally consented to proceed with the evaluation.      Type of surgery sought: Sleeve gastrectomy      History of present illness:    Ms. Sarah Mukherjee is a 45-year-old White female who is pursuing bariatric surgery to improve her health and quality of life. She has a history of insomnia and anxiety that are well controlled with medication. She  has never been hospitalized for psychiatric reasons and denied any history of suicidality.  She has begun making positive lifestyle changes in anticipation for surgery, with good benefit. The patient has a Body Mass Index of 39.76 as documented by the referring provider.      Ms. Mukherjee has struggled with weight since she had a hysterectomy 18 years ago. Factors that have contributed to her weight gain over the years include hormonal changes and lack of exercise due to back pain.  She denied a history of emotional eating. She has tried many weight loss methods on her own (i.e., Weight Watchers, low-carb diet, phentermine (Adipex-P), and portion control) with little success, and she believes that her biggest weight loss challenge is "I can't find anything that works more than losing 5-10 pounds."  Her motivation for seeking surgery now is to improve her health and reduce back pain.     Ms. Mukherjee has met with Ms. Enrique RD, bariatric dietician, and reports that she?demonstrated knowledge of healthy eating behaviors and lifestyle changes needed. She must continue meeting with Ms. Nunez to demonstrate the implementation of lifestyle changes prior to clearance for bariatric surgery.      Co-morbidities:   essential hypertension, osteoarthritis      Knowledge of surgery information:   - Basics of procedure: cut stomach to the size and shape " of a banana   - Risks: leakage, vitamin deficiency, dehydration   - Basics of diet: high protein, low carbs, no carbonated beverages, liquids only at first       Current Psychiatric Symptoms:    Depression - Denied depressed mood, loss of interest in pleasurable activities, anhedonia, sleep changes, decreased motivation, decreased concentration, feelings of excessive or irrational guilt, helplessness, hopelessness, increased or decreased appetite, weight changes, increased or decreased motor activity, decreased energy,?suicidal ideations/thoughts of death.   Rossy/Hypomania - Denied increased goal directed activity,?decreased need for sleep, pressured speech or increased talkative,?racing thoughts,?increased risk-taking behavior,?episodic elevated or irritable mood,?flight of ideas, distractibility, inflated self-esteem, grandiosity   Anxiety - Denied excessive worry, difficulty controlling worrying, feeling keyed up, easily fatigued, difficulty concentrating or mind going blank, irritability, muscle tension, sleep disturbance, racing thoughts, being unable to relax, specific phobia.   Panic Attacks: Denied palpitations, sweating, trembling, dyspnea, choking sensation, chest pain/discomfort, nausea, dizziness, chills or hot flashes, tingling, derealization, fear of losing control, fear of dying.   Thoughts - Denied any AVH, paranoia, delusions, ideas of reference, thought insertion or thought broadcasting   Suicidal thoughts/behaviors - denied passive or active SI, denied suicidal plans or intent   Self-injury - denied.   Substance abuse - denied abuse or dependence.    Sleep - Denied increased sleep latency, frequent nighttime awakenings, or early morning awakening with inability to return to sleep.      Current psychiatric treatment:   Medications: Xanax, Ambien   Psychotherapy: Denied      Current Health Behaviors:   Compliant with medical regimens and appointments: Yes  Prescription medication misuse: No    Exercise: No (has significant back pain - DDD)  Adequate cognitive functioning: Yes     Past Psychiatric history:    Previous diagnosis:   MDD (in remission), anxiety  Previous psychiatric hospitalizations/inpatient treatment: none   History of outpatient treatment: none   Previous suicide attempt: none   Non-suicidal self-injury: none      Trauma history:   Denies.      PTSD: Denies re-experiencing trauma, nightmares, increased awareness of surroundings, hyperexcitability.      History of eating disorders:   History of bulimia: Denies recurrent episodes of eating then engaging in inappropriate compensatory behaviors. ?        History of binge-eating episodes: Denies eating excessive amounts of food within a discrete time period with a lack of control during eating. ?She denied eating more rapidly than normal, eating until uncomfortably full, eating large amounts of food when not physically hungry, eating alone due to embarrassment, or negative emotions (i.e., disgusted, guilty, depressed) afterwards.      Family history of psychiatric illness: None reported.       Social history (marriage, employment, etc.): Ms. Mukherjee was born and raised in [place], [place].  She described her childhood as average.  She denied childhood trauma, abuse, and neglect. She completed high school.  She is currently on disability (bladder issues and back issues). She has been  for 25 years.  She has no children.  She currently lives with her .      Current psychosocial stressors:  denies     Report of coping skills/recreational activities:  read Bible, sip on coffee, pray     Support system:  , family      Substance use:    Alcohol:   denies  Drugs: Denied current use; denied history of abuse or dependency.   Tobacco: None.    Caffeine:   recently eliminated all caffeine      Current medications and drug reactions (include OTC, herbal): see medication list       PSYCHOLOGICAL ASSESSMENT/TESTING:    All tests were  administered according to standardized procedures and were selected based on the reason for referral.   The MMPI-3 provides an assessment of personality and psychopathology with specific evaluation of psychosocial risk factors associated with outcomes of bariatric surgery.    Ms. Mukherjee produced a valid and interpretable MMPI-3 protocol.  Her test results indicate she attempted to place herself in an overly positive light by minimizing faults and denying psychological problems. ?Of note, this profile is often seen in settings where testing is used to evaluate fitness for desired medical procedures.    TEST RESULTS. Ms. Mukherjee scores do not indicate any somatic, cognitive, thought, interpersonal, interpersonal, or behavioral dysfunction. Emotionally, she reports elevated compulsivity, indicating she tends to be rigid, perfectionistic, and may engage in compulsive behaviors. There are no specific treatment recommendations indicated by her profile.   GROUP COMPARISONS. Compared to other bariatric surgery candidates, Ms. Mukherjee reports greater levels of compulsivity and lower levels of stress.   ?    FEEDBACK. Ms. Mukherjee was provided with test results, and offered the opportunity to respond to feedback and clarify results if needed. She acknowledged the results as being largely consistent with her overall functioning and well-being. She acknowledged liking things in very specific ways (towels folded, pillows on bed, etc.) but denied any distress or impairment related to this.        Mental Status Exam:    General Appearance:   age appropriate, well dressed, neatly groomed, overweight     Speech:   normal tone, normal rate, normal pitch, normal volume     Level of Cooperation:   cooperative     Thought Processes:   normal and logical     Mood:   euthymic     Thought Content:   normal, no suicidality, no homicidality, delusions, or paranoia     Affect:   congruent and appropriate     Orientation:   oriented x3      Memory:   recent memory intact; immediate and delayed word recall 3/3   remote memory intact; able to recall remote personal events    Attention Span & Concentration:   appropriate    Fund of General Knowledge:   appropriate for education     Abstract Reasoning:   Not directly assessed    Judgment & Insight:   good     Language   intact           SUMMARY AND RECOMMENDATIONS:   Ms. Sarah Mukherjee is a 45 y.o.-year-old female referred for presurgical psychological evaluation prior to bariatric surgery. Results of personality testing should be considered valid, and they indicate that she is experiencing no acute psychiatric symptoms or declines in functioning at this time. Test results do not reveal any evidence that psychological difficulties would play a role in her recovery from surgery. Ms. Mukherjee's testing profile was largely consistent with her reports in the clinical interview. In the clinical interview, Ms. Mukherjee endorsed a history of anxiety that is mild and well managed at this time.  There are no overt psychological contraindications for proceeding with bariatric surgery. Overall, Ms. Mukherjee is at LOW risk for adverse postsurgical outcomes based on the following considerations:   There are no indications of disabling psychopathology, substance use/abuse, cognitive problems, or disabilities that would prevent understanding and competence with medical treatment.  There are no reports or major psychosocial stressors that would interfere with her adherence to treatment recommendations.  There is no evidence of suicidality.   She exhibits high social stability and good social support.  She has good coping strategies to deal with stress and the demands of surgery and recovery.   She has good knowledge about the surgical procedure, good knowledge about the required dietary and lifestyle changes, and adequate understanding of possible risks of this treatment option. She reports adequate compliance with  prior medical regimens.   There are no recommendations for psychological intervention at this time. This patient is a suitable candidate to proceed with bariatric surgery.    Diagnosis:     ICD-10-CM ICD-9-CM   1. Preoperative evaluation to rule out surgical contraindication  Z01.818 V72.83   2. Morbid obesity  E66.01 278.01   3. BMI 39.0-39.9,adult  Z68.39 V85.39   4. Essential hypertension  I10 401.9       Plan: This report will be sent to the referring provider with impressions and recommendations. It will be the referring team's decision whether the patient proceeds with surgery. Services related to the presurgical psychological evaluation are now concluded.             Viola Sandra, PhD  Licensed Clinical Psychologist ([id number])  [facility]

## 2025-06-03 ENCOUNTER — PATIENT MESSAGE (OUTPATIENT)
Dept: BARIATRICS | Facility: CLINIC | Age: 45
End: 2025-06-03
Payer: MEDICARE

## 2025-06-16 ENCOUNTER — CLINICAL SUPPORT (OUTPATIENT)
Dept: BARIATRICS | Facility: CLINIC | Age: 45
End: 2025-06-16
Payer: MEDICARE

## 2025-06-16 DIAGNOSIS — E66.9 OBESITY (BMI 30-39.9): ICD-10-CM

## 2025-06-16 DIAGNOSIS — Z71.3 DIETARY COUNSELING: Primary | ICD-10-CM

## 2025-06-16 NOTE — PROGRESS NOTES
The patient location is: [place]  The chief complaint leading to consultation is: obesity    Visit type: audiovisual    Face to Face time with patient: 30 min  30 minutes of total time spent on the encounter, which includes face to face time and non-face to face time preparing to see the patient (eg, review of tests), Obtaining and/or reviewing separately obtained history, Documenting clinical information in the electronic or other health record, Independently interpreting results (not separately reported) and communicating results to the patient/family/caregiver, or Care coordination (not separately reported).         Each patient to whom he or she provides medical services by telemedicine is:  (1) informed of the relationship between the physician and patient and the respective role of any other health care provider with respect to management of the patient; and (2) notified that he or she may decline to receive medical services by telemedicine and may withdraw from such care at any time.    NUTRITIONAL Note     Referring Physician: Diane Soares M.D.   Reason for MNT Referral: Follow up assessment for sleeve gastrectomy work-up     45 y.o. female presents for follow up with 2 lbs weight loss over the past month by continuing to make dietary and lifestyle changes in preparation for bariatric surgery.              Past Medical History:   Diagnosis Date    Allergy      Anxiety      Arthritis      Crohn's disease      Degenerative joint disease of spine      Esophagitis      History of stomach ulcers      Hypertension      Lumbar facet arthropathy 06/20/2014    Neurogenic bladder      Neuromuscular disorder        CLINICAL DATA:  45 y.o.-year-old White female.  Height: 5 ft. 2 in.  Weight: 209 lbs  IBW: 132 lbs  BMI: 38.5 (from 39.7)     DAILY NUTRITIONAL NEEDS: pre-op nutritional bariatric guidelines to promote weight loss  2193-9766 Calories    Grams Protein     NUTRITION & HEALTH  HISTORY:  Greatest challenge: starchy CHO and irregular meal patterns     Current diet recall:      B: Fairlife shake  L: Fairlife shake  - fruit and sunflower seeds  D: steak, peppers and onion     Current Diet:  Meal pattern: 1 meal + 1-2 snacks + 2 protein supplements  Protein supplements: Fairlife shakes  Snacking: nuts, sunflower seeds, cheese, fruits  Vegetables: Likes a variety. No brocc/cauli. Eats almost daily.  Fruits: Likes a variety. Eats  daily.  Beverages: water/CL  Dining out/delivery: Weekly. Monthly. Mostly restaurants.  Cooking at home: Daily. Weekly. Mostly baked/roast, grilled/broiled, boiled, smothered, braised/stewed, pan-fried/sauteed, and steamed beef, fish/seafood, pork, chicken/turkey, (limited starchy CHO), vegetables, and beans  Doesn't keep bread in the house. Doesn't really like fish.     Exercise:  Physical therapy for shoulder twice/week  Brisk walk in the neighborhood     Restrictions to Exercise: back pain     Vitamins / Minerals / Herbs:   B12 gummies  Benefiber     Labs:   reviewed     Food Allergies:   None known     Social:  No work.  Lives with her .  Grocery shopping and food prep done by the pt.  Patient believes the household will be supportive after surgery.  Alcohol: None.  Smoking: None.     ASSESSMENT:  Patient demonstrated knowledge of healthy eating behaviors and exercise patterns.  Patient demonstrates willingness to change lifestyle and make behavior modifications AEB weight loss, dietary changes, protein supplements and exercise.     Barriers to Education: none     Stage of change: action     NUTRITION DIAGNOSIS:    Obesity related to Physical inactivity as evidence by BMI.     BARIATRIC DIET DISCUSSION/PLAN:  Discussed diet after surgery and related to patient's food record.  Reviewed nutrition guidelines for before and after surgery.  Answered all questions.  Continue to review Bariatric Nutrition Guidebook at home and call with any questions.  Work on  Bariatric Nutrition Checklist.  1200-calorie diet.  1500-calorie diet.  5-6 meals per day  Maintain/Increase exercise     RECOMMENDATIONS:  Pt is a potential candidate for bariatric surgery and needs additional medically supervised diet counseling and surveillance  Follow up in one month.      Patient verbalized understanding.     Communicated nutrition plan with bariatric team.     SESSION TIME:  30 minutes

## 2025-07-07 ENCOUNTER — PATIENT MESSAGE (OUTPATIENT)
Dept: BARIATRICS | Facility: CLINIC | Age: 45
End: 2025-07-07
Payer: MEDICARE

## 2025-07-08 ENCOUNTER — PATIENT MESSAGE (OUTPATIENT)
Dept: BARIATRICS | Facility: CLINIC | Age: 45
End: 2025-07-08
Payer: MEDICARE

## 2025-07-22 ENCOUNTER — CLINICAL SUPPORT (OUTPATIENT)
Dept: BARIATRICS | Facility: CLINIC | Age: 45
End: 2025-07-22
Payer: MEDICARE

## 2025-07-22 VITALS — BODY MASS INDEX: 38.31 KG/M2 | WEIGHT: 209.44 LBS

## 2025-07-22 DIAGNOSIS — E66.9 OBESITY (BMI 30-39.9): ICD-10-CM

## 2025-07-22 DIAGNOSIS — Z71.3 DIETARY COUNSELING: Primary | ICD-10-CM

## 2025-07-22 PROCEDURE — 97803 MED NUTRITION INDIV SUBSEQ: CPT | Mod: PBBFAC | Performed by: DIETITIAN, REGISTERED

## 2025-07-22 PROCEDURE — 99999PBSHW PR PBB SHADOW TECHNICAL ONLY FILED TO HB: Mod: PBBFAC,,,

## 2025-07-22 NOTE — PROGRESS NOTES
NUTRITIONAL Note     Referring Physician: Diane Soares M.D.   Reason for MNT Referral: Follow up assessment for sleeve gastrectomy work-up     45 y.o. female presents for follow up with weight stable over the past month; total 8 lbs weight loss by continuing to make dietary and lifestyle changes in preparation for bariatric surgery.              Past Medical History:   Diagnosis Date    Allergy      Anxiety      Arthritis      Crohn's disease      Degenerative joint disease of spine      Esophagitis      History of stomach ulcers      Hypertension      Lumbar facet arthropathy 06/20/2014    Neurogenic bladder      Neuromuscular disorder        CLINICAL DATA:  45 y.o.-year-old White female.  Height: 5 ft. 2 in.  Weight: 209 lbs  IBW: 132 lbs  BMI: 38.5 (from 39.7)     DAILY NUTRITIONAL NEEDS: pre-op nutritional bariatric guidelines to promote weight loss  7092-8413 Calories    Grams Protein     NUTRITION & HEALTH HISTORY:  Greatest challenge: starchy CHO and irregular meal patterns     Current diet recall:      B: Fairlife shake  - cheese stick, turkey meat stick  L: Fairlife shake  D: deli meat slices, kiwi, pickle OR okra/shrimp stew, no rice OR sauteed zucchini and lean ground meat     Current Diet:  Meal pattern: 1 meal + 1-2 snacks + 2 protein supplements  Protein supplements: Fairlife shakes  Snacking: cheese, turkey meat sticks, fruits, pickles  Vegetables: Likes a variety. No brocc/cauli. Eats almost daily.  Fruits: Likes a variety. Eats  daily.  Beverages: water/CL  Dining out/delivery: Weekly. Monthly. Mostly restaurants.  Cooking at home: Daily. Weekly. Mostly baked/roast, grilled/broiled, boiled, smothered, braised/stewed, pan-fried/sauteed, and steamed beef, fish/seafood, pork, chicken/turkey, (limited starchy CHO), vegetables, and beans  Doesn't keep bread in the house. Doesn't really like fish.     Exercise:  Staying active working a [employer]     Restrictions to Exercise: back pain      Vitamins / Minerals / Herbs:   B12 gummies  Benefiber     Labs:   reviewed     Food Allergies:   None known     Social:  No work.  Lives with her .  Grocery shopping and food prep done by the pt.  Patient believes the household will be supportive after surgery.  Alcohol: None.  Smoking: None.     ASSESSMENT:  Patient demonstrated knowledge of healthy eating behaviors and exercise patterns.  Patient demonstrates willingness to change lifestyle and make behavior modifications AEB weight loss, dietary changes, protein supplements and exercise.     Barriers to Education: none     Stage of change: action     NUTRITION DIAGNOSIS:    Obesity related to Physical inactivity as evidence by BMI.     BARIATRIC DIET DISCUSSION/PLAN:  Discussed diet after surgery and related to patient's food record.  Reviewed nutrition guidelines for before and after surgery.  Answered all questions.  Continue to review Bariatric Nutrition Guidebook at home and call with any questions.  Work on Bariatric Nutrition Checklist.  1200-calorie diet.  1500-calorie diet.  5-6 meals per day  Maintain/Increase exercise     RECOMMENDATIONS:  Pt is a good candidate for bariatric surgery - Sleeve.     Will f/u before/after surgery as needed.     Communicated nutrition plan with bariatric team.     SESSION TIME:  30 minutes

## 2025-07-30 ENCOUNTER — TELEPHONE (OUTPATIENT)
Dept: BARIATRICS | Facility: CLINIC | Age: 45
End: 2025-07-30
Payer: MEDICARE

## 2025-07-30 DIAGNOSIS — Z87.19 HISTORY OF ESOPHAGITIS: ICD-10-CM

## 2025-07-30 DIAGNOSIS — R94.31 ABNORMAL EKG: ICD-10-CM

## 2025-07-30 DIAGNOSIS — Z87.11 HISTORY OF GASTRIC ULCER: Primary | ICD-10-CM

## 2025-07-30 NOTE — TELEPHONE ENCOUNTER
Called patient to advise her that she will need a stress test and Cardiology Clearance due to her abnormal EKG. Also advised her that, due to her history of ulcers and esophagitis 10 years ago, an EGD must be done to ensure there is no existing issue. Patient stated understanding and agreement. Stress Test scheduled. Order placed for Endoscopy and Staff Message sent to Endoscopy to expedite if possible.

## 2025-07-31 ENCOUNTER — TELEPHONE (OUTPATIENT)
Dept: ENDOSCOPY | Facility: HOSPITAL | Age: 45
End: 2025-07-31

## 2025-07-31 ENCOUNTER — CLINICAL SUPPORT (OUTPATIENT)
Dept: ENDOSCOPY | Facility: HOSPITAL | Age: 45
End: 2025-07-31
Payer: MEDICARE

## 2025-07-31 VITALS — WEIGHT: 206 LBS | HEIGHT: 62 IN | BODY MASS INDEX: 37.91 KG/M2

## 2025-07-31 DIAGNOSIS — Z87.11 HISTORY OF GASTRIC ULCER: ICD-10-CM

## 2025-07-31 DIAGNOSIS — Z87.19 HISTORY OF ESOPHAGITIS: ICD-10-CM

## 2025-07-31 NOTE — TELEPHONE ENCOUNTER
Patient is scheduled for a Upper Endoscopy (EGD) on 8/4/25 with Dr. Avalos   Referral for procedure from [facility] appointment

## 2025-08-04 ENCOUNTER — HOSPITAL ENCOUNTER (OUTPATIENT)
Facility: HOSPITAL | Age: 45
Discharge: HOME | End: 2025-08-04
Attending: INTERNAL MEDICINE | Admitting: INTERNAL MEDICINE
Payer: MEDICARE

## 2025-08-04 ENCOUNTER — ANESTHESIA (OUTPATIENT)
Dept: ENDOSCOPY | Facility: HOSPITAL | Age: 45
End: 2025-08-04
Payer: MEDICARE

## 2025-08-04 ENCOUNTER — ANESTHESIA EVENT (OUTPATIENT)
Dept: ENDOSCOPY | Facility: HOSPITAL | Age: 45
End: 2025-08-04
Payer: MEDICARE

## 2025-08-04 VITALS
WEIGHT: 206.81 LBS | HEIGHT: 62 IN | RESPIRATION RATE: 18 BRPM | TEMPERATURE: 98 F | BODY MASS INDEX: 38.06 KG/M2 | HEART RATE: 56 BPM | OXYGEN SATURATION: 96 % | SYSTOLIC BLOOD PRESSURE: 121 MMHG | DIASTOLIC BLOOD PRESSURE: 75 MMHG

## 2025-08-04 DIAGNOSIS — Z87.11 HISTORY OF GASTRIC ULCER: ICD-10-CM

## 2025-08-04 DIAGNOSIS — K20.90 ESOPHAGITIS: ICD-10-CM

## 2025-08-04 DIAGNOSIS — Z87.19 HISTORY OF ESOPHAGITIS: ICD-10-CM

## 2025-08-04 PROCEDURE — 88305 TISSUE EXAM BY PATHOLOGIST: CPT | Mod: TC | Performed by: INTERNAL MEDICINE

## 2025-08-04 PROCEDURE — 43239 EGD BIOPSY SINGLE/MULTIPLE: CPT | Mod: ,,, | Performed by: INTERNAL MEDICINE

## 2025-08-04 PROCEDURE — 88342 IMHCHEM/IMCYTCHM 1ST ANTB: CPT | Mod: 26,,, | Performed by: STUDENT IN AN ORGANIZED HEALTH CARE EDUCATION/TRAINING PROGRAM

## 2025-08-04 PROCEDURE — 63600175 PHARM REV CODE 636 W HCPCS

## 2025-08-04 PROCEDURE — 43239 EGD BIOPSY SINGLE/MULTIPLE: CPT | Performed by: INTERNAL MEDICINE

## 2025-08-04 PROCEDURE — 88305 TISSUE EXAM BY PATHOLOGIST: CPT | Mod: 26,,, | Performed by: STUDENT IN AN ORGANIZED HEALTH CARE EDUCATION/TRAINING PROGRAM

## 2025-08-04 PROCEDURE — 25000003 PHARM REV CODE 250

## 2025-08-04 PROCEDURE — 37000008 HC ANESTHESIA 1ST 15 MINUTES: Performed by: INTERNAL MEDICINE

## 2025-08-04 PROCEDURE — 27201012 HC FORCEPS, HOT/COLD, DISP: Performed by: INTERNAL MEDICINE

## 2025-08-04 RX ORDER — PROPOFOL 10 MG/ML
VIAL (ML) INTRAVENOUS CONTINUOUS PRN
Status: DISCONTINUED | OUTPATIENT
Start: 2025-08-04 | End: 2025-08-04

## 2025-08-04 RX ORDER — SODIUM CHLORIDE 9 MG/ML
INJECTION, SOLUTION INTRAVENOUS CONTINUOUS
Status: DISCONTINUED | OUTPATIENT
Start: 2025-08-04 | End: 2025-08-04 | Stop reason: HOSPADM

## 2025-08-04 RX ORDER — GLUCAGON 1 MG
1 KIT INJECTION
OUTPATIENT
Start: 2025-08-04

## 2025-08-04 RX ORDER — SODIUM CHLORIDE 0.9 % (FLUSH) 0.9 %
10 SYRINGE (ML) INJECTION
OUTPATIENT
Start: 2025-08-04

## 2025-08-04 RX ORDER — LIDOCAINE HYDROCHLORIDE 20 MG/ML
INJECTION INTRAVENOUS
Status: DISCONTINUED | OUTPATIENT
Start: 2025-08-04 | End: 2025-08-04

## 2025-08-04 RX ORDER — DEXMEDETOMIDINE HYDROCHLORIDE 100 UG/ML
INJECTION, SOLUTION INTRAVENOUS
Status: DISCONTINUED | OUTPATIENT
Start: 2025-08-04 | End: 2025-08-04

## 2025-08-04 RX ADMIN — DEXMEDETOMIDINE 4 MCG: 100 INJECTION, SOLUTION, CONCENTRATE INTRAVENOUS at 11:08

## 2025-08-04 RX ADMIN — GLYCOPYRROLATE 0.2 MG: 0.2 INJECTION, SOLUTION INTRAMUSCULAR; INTRAVENOUS at 11:08

## 2025-08-04 RX ADMIN — SODIUM CHLORIDE: 0.9 INJECTION, SOLUTION INTRAVENOUS at 11:08

## 2025-08-04 RX ADMIN — PROPOFOL 200 MCG/KG/MIN: 10 INJECTION, EMULSION INTRAVENOUS at 11:08

## 2025-08-04 RX ADMIN — PROPOFOL 70 MG: 10 INJECTION, EMULSION INTRAVENOUS at 11:08

## 2025-08-04 RX ADMIN — LIDOCAINE HYDROCHLORIDE 100 MG: 20 INJECTION INTRAVENOUS at 11:08

## 2025-08-04 NOTE — ANESTHESIA PREPROCEDURE EVALUATION
08/04/2025  Sarah Mukherjee is a 45 y.o., female with a PMHx that includes HTN, lumbar disc disease, obesity, and PUD who presents for EGD      Pre-op Assessment    I have reviewed the Patient Summary Reports.     I have reviewed the Nursing Notes. I have reviewed the NPO Status.   I have reviewed the Medications.     Review of Systems  Anesthesia Hx:  No problems with previous Anesthesia               Denies Personal Hx of Anesthesia complications.                    Social:  Non-Smoker, No Alcohol Use       Hematology/Oncology:  Hematology Normal   Oncology Normal                                   Cardiovascular:     Hypertension                                          Pulmonary:  Pulmonary Normal                       Renal/:  Renal/ Normal                 Hepatic/GI:   PUD,                  Musculoskeletal:  Musculoskeletal Normal                Neurological:    Neuromuscular Disease,                                   Psych:  Psychiatric History                  Physical Exam  General: Well nourished, Cooperative, Alert and Oriented    Airway:  Mallampati: I   Mouth Opening: Normal  TM Distance: Normal  Tongue: Normal  Neck ROM: Normal ROM    Dental:  Intact    Chest/Lungs:  Clear to auscultation, Normal Respiratory Rate    Heart:  Rate: Normal  Rhythm: Regular Rhythm        Anesthesia Plan  Type of Anesthesia, risks & benefits discussed:    Anesthesia Type: Gen ETT, Gen Natural Airway  Intra-op Monitoring Plan: Standard ASA Monitors  Post Op Pain Control Plan: multimodal analgesia and IV/PO Opioids PRN  Induction:  IV  Airway Plan: Direct, Post-Induction  Informed Consent: Informed consent signed with the Patient and all parties understand the risks and agree with anesthesia plan.  All questions answered.   ASA Score: 2  Day of Surgery Review of History & Physical: H&P Update referred to the  surgeon/provider.    Ready For Surgery From Anesthesia Perspective.     .

## 2025-08-04 NOTE — TRANSFER OF CARE
"Anesthesia Transfer of Care Note    Patient: Sarah Mukherjee    Procedure(s) Performed: Procedure(s) (LRB):  EGD (ESOPHAGOGASTRODUODENOSCOPY) (N/A)    Patient location: [facility]    Anesthesia Type: general    Transport from OR: Transported from OR on 2-3 L/min O2 by NC with adequate spontaneous ventilation    Post pain: adequate analgesia    Post assessment: no apparent anesthetic complications    Post vital signs: stable    Level of consciousness: sedated    Nausea/Vomiting: no nausea/vomiting    Complications: none    Transfer of care protocol was followed      Last vitals: Visit Vitals  /76 (BP Location: Left arm, Patient Position: Lying)   Pulse (!) 52   Temp 36.6 °C (97.9 °F) (Temporal)   Resp 16   Ht 5' 2" (1.575 m)   Wt 93.8 kg (206 lb 12.7 oz)   SpO2 95%   Breastfeeding No   BMI 37.82 kg/m²     "

## 2025-08-05 NOTE — ANESTHESIA POSTPROCEDURE EVALUATION
Anesthesia Post Evaluation    Patient: Sarah Mukherjee    Procedure(s) Performed: Procedure(s) (LRB):  EGD (ESOPHAGOGASTRODUODENOSCOPY) (N/A)    Final Anesthesia Type: general      Patient location during evaluation: GI PACU  Patient participation: Yes- Able to Participate  Level of consciousness: awake and alert  Post-procedure vital signs: reviewed and stable  Pain management: adequate  Airway patency: patent    PONV status at discharge: No PONV  Anesthetic complications: no      Cardiovascular status: blood pressure returned to baseline and hemodynamically stable  Respiratory status: spontaneous ventilation  Hydration status: euvolemic  Follow-up not needed.              Vitals Value Taken Time   /75 08/04/25 12:16   Temp 36.6 °C (97.9 °F) 08/04/25 11:46   Pulse 56 08/04/25 12:16   Resp 18 08/04/25 12:16   SpO2 96 % 08/04/25 12:16         Event Time   Out of Recovery 12:29:00         Pain/Cassy Score: Cassy Score: 10 (8/4/2025 12:01 PM)

## 2025-08-06 LAB
ESTROGEN SERPL-MCNC: NORMAL PG/ML
INSULIN SERPL-ACNC: NORMAL U[IU]/ML
LAB AP CLINICAL INFORMATION: NORMAL
LAB AP GROSS DESCRIPTION: NORMAL
LAB AP PERFORMING LOCATION(S): NORMAL
LAB AP REPORT FOOTNOTES: NORMAL
T3RU NFR SERPL: NORMAL %

## 2025-08-10 ENCOUNTER — PATIENT MESSAGE (OUTPATIENT)
Dept: ELECTROPHYSIOLOGY | Facility: CLINIC | Age: 45
End: 2025-08-10
Payer: MEDICARE

## 2025-08-11 ENCOUNTER — HOSPITAL ENCOUNTER (OUTPATIENT)
Dept: CARDIOLOGY | Facility: HOSPITAL | Age: 45
Discharge: HOME | End: 2025-08-11
Attending: PHYSICIAN ASSISTANT
Payer: MEDICARE

## 2025-08-11 ENCOUNTER — TELEPHONE (OUTPATIENT)
Dept: BARIATRICS | Facility: CLINIC | Age: 45
End: 2025-08-11
Payer: MEDICARE

## 2025-08-11 VITALS — HEIGHT: 62 IN | WEIGHT: 206 LBS | BODY MASS INDEX: 37.91 KG/M2

## 2025-08-11 DIAGNOSIS — Z91.89 AT RISK FOR POLYPHARMACY: ICD-10-CM

## 2025-08-11 DIAGNOSIS — R94.31 ABNORMAL EKG: ICD-10-CM

## 2025-08-11 DIAGNOSIS — E66.01 MORBID OBESITY: ICD-10-CM

## 2025-08-11 DIAGNOSIS — R79.9 ABNORMAL FINDING OF BLOOD CHEMISTRY, UNSPECIFIED: ICD-10-CM

## 2025-08-11 DIAGNOSIS — Z79.899 LONG-TERM USE OF HIGH-RISK MEDICATION: Primary | ICD-10-CM

## 2025-08-11 LAB
AORTIC SIZE INDEX (SOV): 1.4 CM/M2
AV INDEX (PROSTH): 0.74
AV MEAN GRADIENT: 4 MMHG
AV PEAK GRADIENT: 9 MMHG
AV VALVE AREA BY VELOCITY RATIO: 2.5 CM²
AV VALVE AREA: 2.5 CM²
AV VELOCITY RATIO: 0.73
BSA FOR ECHO PROCEDURE: 2.02 M2
CV ECHO LV RWT: 0.3 CM
CV STRESS BASE HR: 73 BPM
DIASTOLIC BLOOD PRESSURE: 79 MMHG
DOP CALC AO PEAK VEL: 1.5 M/S
DOP CALC AO VTI: 32.1 CM
DOP CALC LVOT AREA: 3.5 CM2
DOP CALC LVOT DIAMETER: 2.1 CM
DOP CALC LVOT PEAK VEL: 1.1 M/S
DOP CALC MV VTI: 28.6 CM
DOP CALCLVOT PEAK VEL VTI: 23.6 CM
E WAVE DECELERATION TIME: 176 MSEC
E/A RATIO: 1.23
E/E' RATIO: 8 M/S
ECHO LV POSTERIOR WALL: 0.7 CM (ref 0.6–1.1)
FRACTIONAL SHORTENING: 27.7 % (ref 28–44)
INTERVENTRICULAR SEPTUM: 0.6 CM (ref 0.6–1.1)
IVC DIAMETER: 1.44 CM
LA MAJOR: 5.3 CM
LA MINOR: 4.3 CM
LA WIDTH: 4.5 CM
LEFT ATRIUM SIZE: 4.2 CM
LEFT ATRIUM VOLUME INDEX: 39 ML/M2
LEFT ATRIUM VOLUME: 76 CM3
LEFT INTERNAL DIMENSION IN SYSTOLE: 3.4 CM (ref 2.1–4)
LEFT VENTRICLE DIASTOLIC VOLUME INDEX: 53.61 ML/M2
LEFT VENTRICLE DIASTOLIC VOLUME: 104 ML
LEFT VENTRICLE MASS INDEX: 48.4 G/M2
LEFT VENTRICLE SYSTOLIC VOLUME INDEX: 24.2 ML/M2
LEFT VENTRICLE SYSTOLIC VOLUME: 47 ML
LEFT VENTRICULAR INTERNAL DIMENSION IN DIASTOLE: 4.7 CM (ref 3.5–6)
LEFT VENTRICULAR MASS: 93.9 G
LV LATERAL E/E' RATIO: 7.3 M/S
LV SEPTAL E/E' RATIO: 8.9 M/S
LVED V (TEICH): 104.13 ML
LVES V (TEICH): 47.07 ML
LVOT MG: 2.3 MMHG
LVOT MV: 0.71 CM/S
Lab: 1.7 CM/M
MV MEAN GRADIENT: 1 MMHG
MV PEAK A VEL: 0.65 M/S
MV PEAK E VEL: 0.8 M/S
MV PEAK GRADIENT: 3 MMHG
MV STENOSIS PRESSURE HALF TIME: 70.69 MS
MV VALVE AREA BY CONTINUITY EQUATION: 2.86 CM2
MV VALVE AREA P 1/2 METHOD: 3.11 CM2
OHS CV CPX 85 PERCENT MAX PREDICTED HEART RATE MALE: 148
OHS CV CPX MAX PREDICTED HEART RATE: 174
OHS CV CPX PATIENT HEIGHT IN: 62
OHS CV CPX PATIENT IS FEMALE: 1
OHS CV CPX PATIENT IS MALE: 0
OHS CV CPX PEAK DIASTOLIC BLOOD PRESSURE: 68 MMHG
OHS CV CPX PEAK HEAR RATE: 153 BPM
OHS CV CPX PEAK RATE PRESSURE PRODUCT: ABNORMAL
OHS CV CPX PEAK SYSTOLIC BLOOD PRESSURE: 179 MMHG
OHS CV CPX PERCENT MAX PREDICTED HEART RATE ACHIEVED: 92
OHS CV CPX RATE PRESSURE PRODUCT PRESENTING: 7738
OHS CV RV/LV RATIO: 0.91 CM
PISA TR MAX VEL: 2 M/S
PULM VEIN S/D RATIO: 1.26
PV PEAK D VEL: 0.61 M/S
PV PEAK GRADIENT: 2 MMHG
PV PEAK S VEL: 0.77 M/S
PV PEAK VELOCITY: 0.76 M/S
RA MAJOR: 4.02 CM
RA PRESSURE ESTIMATED: 8 MMHG
RA WIDTH: 4.9 CM
RIGHT VENTRICLE DIASTOLIC BASEL DIMENSION: 4.3 CM
RIGHT VENTRICULAR END-DIASTOLIC DIMENSION: 4.28 CM
RV TB RVSP: 10 MMHG
RV TISSUE DOPPLER FREE WALL SYSTOLIC VELOCITY 1 (APICAL 4 CHAMBER VIEW): 11.35 CM/S
SINUS: 2.7 CM
STJ: 2.9 CM
STRESS ST DEPRESSION: 1 MM
SYSTOLIC BLOOD PRESSURE: 106 MMHG
TDI LATERAL: 0.11 M/S
TDI SEPTAL: 0.09 M/S
TDI: 0.1 M/S
TR MAX PG: 15 MMHG
TRICUSPID ANNULAR PLANE SYSTOLIC EXCURSION: 2.4 CM
TV REST PULMONARY ARTERY PRESSURE: 24 MMHG
Z-SCORE OF LEFT VENTRICULAR DIMENSION IN END DIASTOLE: -1.59
Z-SCORE OF LEFT VENTRICULAR DIMENSION IN END SYSTOLE: 0.02

## 2025-08-11 PROCEDURE — 25500020 PHARM REV CODE 255

## 2025-08-11 PROCEDURE — 93320 DOPPLER ECHO COMPLETE: CPT | Mod: 26,,, | Performed by: INTERNAL MEDICINE

## 2025-08-11 PROCEDURE — 93325 DOPPLER ECHO COLOR FLOW MAPG: CPT | Mod: 26,,, | Performed by: INTERNAL MEDICINE

## 2025-08-11 PROCEDURE — 93351 STRESS TTE COMPLETE: CPT | Mod: 26,,, | Performed by: INTERNAL MEDICINE

## 2025-08-11 PROCEDURE — 93320 DOPPLER ECHO COMPLETE: CPT

## 2025-08-11 PROCEDURE — 63600175 PHARM REV CODE 636 W HCPCS: Performed by: INTERNAL MEDICINE

## 2025-08-11 RX ORDER — DOBUTAMINE HYDROCHLORIDE 400 MG/100ML
10-30 INJECTION INTRAVENOUS CONTINUOUS
Status: DISCONTINUED | OUTPATIENT
Start: 2025-08-11 | End: 2025-08-12 | Stop reason: HOSPADM

## 2025-08-11 RX ORDER — ATROPINE SULFATE 1 MG/ML
0.25 INJECTION, SOLUTION INTRAMUSCULAR; INTRAVENOUS; SUBCUTANEOUS
Status: DISCONTINUED | OUTPATIENT
Start: 2025-08-11 | End: 2025-08-12 | Stop reason: HOSPADM

## 2025-08-11 RX ADMIN — ATROPINE SULFATE 0.25 MG: 1 INJECTION, SOLUTION INTRAMUSCULAR; INTRAVENOUS; SUBCUTANEOUS at 10:08

## 2025-08-11 RX ADMIN — DOBUTAMINE HYDROCHLORIDE 10 MCG/KG/MIN: 400 INJECTION INTRAVENOUS at 09:08

## 2025-08-11 RX ADMIN — HUMAN ALBUMIN MICROSPHERES AND PERFLUTREN 0.11 MG: 10; .22 INJECTION, SOLUTION INTRAVENOUS at 11:08

## 2025-08-12 ENCOUNTER — PATIENT MESSAGE (OUTPATIENT)
Dept: BARIATRICS | Facility: CLINIC | Age: 45
End: 2025-08-12
Payer: MEDICARE

## 2025-08-18 ENCOUNTER — OFFICE VISIT (OUTPATIENT)
Dept: FAMILY MEDICINE | Facility: CLINIC | Age: 45
End: 2025-08-18
Payer: MEDICARE

## 2025-08-18 VITALS
SYSTOLIC BLOOD PRESSURE: 118 MMHG | WEIGHT: 205.94 LBS | OXYGEN SATURATION: 98 % | BODY MASS INDEX: 37.66 KG/M2 | RESPIRATION RATE: 18 BRPM | HEART RATE: 57 BPM | TEMPERATURE: 99 F | DIASTOLIC BLOOD PRESSURE: 78 MMHG

## 2025-08-18 DIAGNOSIS — G43.009 MIGRAINE WITHOUT AURA AND WITHOUT STATUS MIGRAINOSUS, NOT INTRACTABLE: ICD-10-CM

## 2025-08-18 DIAGNOSIS — G62.9 NEUROPATHY: ICD-10-CM

## 2025-08-18 DIAGNOSIS — F41.9 ANXIETY: ICD-10-CM

## 2025-08-18 DIAGNOSIS — G47.00 INSOMNIA, UNSPECIFIED TYPE: ICD-10-CM

## 2025-08-18 PROCEDURE — 99999 PR PBB SHADOW E&M-EST. PATIENT-LVL III: CPT | Mod: PBBFAC,,, | Performed by: FAMILY MEDICINE

## 2025-08-18 PROCEDURE — 99213 OFFICE O/P EST LOW 20 MIN: CPT | Mod: PBBFAC,PO | Performed by: FAMILY MEDICINE

## 2025-08-18 PROCEDURE — 99214 OFFICE O/P EST MOD 30 MIN: CPT | Mod: S$PBB,,, | Performed by: FAMILY MEDICINE

## 2025-08-18 RX ORDER — ALPRAZOLAM 0.5 MG/1
0.5 TABLET ORAL DAILY PRN
Qty: 30 TABLET | Refills: 2 | Status: SHIPPED | OUTPATIENT
Start: 2025-08-18

## 2025-08-18 RX ORDER — ZOLPIDEM TARTRATE 10 MG/1
10 TABLET ORAL NIGHTLY
Qty: 30 TABLET | Refills: 5 | Status: SHIPPED | OUTPATIENT
Start: 2025-08-18

## 2025-08-18 RX ORDER — TOPIRAMATE 100 MG/1
100 TABLET, FILM COATED ORAL DAILY
Qty: 90 TABLET | Refills: 3 | Status: SHIPPED | OUTPATIENT
Start: 2025-08-18

## 2025-09-03 ENCOUNTER — TELEPHONE (OUTPATIENT)
Dept: BARIATRICS | Facility: CLINIC | Age: 45
End: 2025-09-03
Payer: MEDICARE

## 2025-09-03 ENCOUNTER — OFFICE VISIT (OUTPATIENT)
Dept: BARIATRICS | Facility: CLINIC | Age: 45
End: 2025-09-03
Payer: MEDICARE

## 2025-09-03 ENCOUNTER — TELEPHONE (OUTPATIENT)
Dept: CARDIOLOGY | Facility: CLINIC | Age: 45
End: 2025-09-03
Payer: MEDICARE

## 2025-09-03 VITALS
SYSTOLIC BLOOD PRESSURE: 119 MMHG | HEIGHT: 62 IN | OXYGEN SATURATION: 97 % | HEART RATE: 60 BPM | WEIGHT: 202.63 LBS | DIASTOLIC BLOOD PRESSURE: 82 MMHG | BODY MASS INDEX: 37.29 KG/M2

## 2025-09-03 DIAGNOSIS — E66.01 MORBID OBESITY: ICD-10-CM

## 2025-09-03 DIAGNOSIS — R94.39 ABNORMAL STRESS ECG: ICD-10-CM

## 2025-09-03 DIAGNOSIS — E66.01 CLASS 2 SEVERE OBESITY DUE TO EXCESS CALORIES WITH SERIOUS COMORBIDITY AND BODY MASS INDEX (BMI) OF 37.0 TO 37.9 IN ADULT: Primary | ICD-10-CM

## 2025-09-03 DIAGNOSIS — E78.2 MIXED HYPERLIPIDEMIA: ICD-10-CM

## 2025-09-03 DIAGNOSIS — M51.360 DEGENERATION OF INTERVERTEBRAL DISC OF LUMBAR REGION WITH DISCOGENIC BACK PAIN: ICD-10-CM

## 2025-09-03 DIAGNOSIS — F41.9 ANXIETY DISORDER, UNSPECIFIED TYPE: ICD-10-CM

## 2025-09-03 DIAGNOSIS — R94.31 ABNORMAL EKG: Primary | ICD-10-CM

## 2025-09-03 DIAGNOSIS — M47.816 LUMBAR FACET ARTHROPATHY: ICD-10-CM

## 2025-09-03 DIAGNOSIS — F33.41 RECURRENT MAJOR DEPRESSIVE DISORDER, IN PARTIAL REMISSION: ICD-10-CM

## 2025-09-03 DIAGNOSIS — E66.812 CLASS 2 SEVERE OBESITY DUE TO EXCESS CALORIES WITH SERIOUS COMORBIDITY AND BODY MASS INDEX (BMI) OF 37.0 TO 37.9 IN ADULT: Primary | ICD-10-CM

## 2025-09-03 DIAGNOSIS — G43.019 INTRACTABLE MIGRAINE WITHOUT AURA AND WITHOUT STATUS MIGRAINOSUS: ICD-10-CM

## 2025-09-03 DIAGNOSIS — N31.9 NEUROGENIC BLADDER: ICD-10-CM

## 2025-09-03 DIAGNOSIS — G89.4 CHRONIC PAIN SYNDROME: ICD-10-CM

## 2025-09-03 PROBLEM — K44.9 DIAPHRAGMATIC HERNIA WITHOUT OBSTRUCTION OR GANGRENE: Status: ACTIVE | Noted: 2018-02-28

## 2025-09-03 PROBLEM — Z87.11 PERSONAL HISTORY OF PEPTIC ULCER DISEASE: Status: ACTIVE | Noted: 2025-09-03

## 2025-09-03 PROBLEM — K58.0 IRRITABLE BOWEL SYNDROME WITH DIARRHEA: Status: ACTIVE | Noted: 2025-09-03

## 2025-09-03 PROBLEM — R14.0 ABDOMINAL DISTENSION (GASEOUS): Status: ACTIVE | Noted: 2025-09-03

## 2025-09-03 PROBLEM — K58.0 IRRITABLE BOWEL SYNDROME WITH DIARRHEA: Status: RESOLVED | Noted: 2020-12-07 | Resolved: 2025-09-03

## 2025-09-03 PROBLEM — R13.10 DYSPHAGIA, UNSPECIFIED: Status: ACTIVE | Noted: 2025-09-03

## 2025-09-03 PROBLEM — R68.81 EARLY SATIETY: Status: ACTIVE | Noted: 2025-09-03

## 2025-09-03 PROBLEM — K59.1 FUNCTIONAL DIARRHEA: Status: ACTIVE | Noted: 2025-09-03

## 2025-09-03 PROBLEM — K21.9 GASTRO-ESOPHAGEAL REFLUX DISEASE WITHOUT ESOPHAGITIS: Status: ACTIVE | Noted: 2025-09-03

## 2025-09-03 PROBLEM — K58.9 IRRITABLE BOWEL SYNDROME (IBS): Status: ACTIVE | Noted: 2020-12-07

## 2025-09-03 PROBLEM — M46.1 SACROILIITIS, NOT ELSEWHERE CLASSIFIED: Status: RESOLVED | Noted: 2024-01-18 | Resolved: 2025-09-03

## 2025-09-03 PROBLEM — K60.0 ACUTE ANAL FISSURE: Status: RESOLVED | Noted: 2025-09-03 | Resolved: 2025-09-03

## 2025-09-03 PROBLEM — K44.9 DIAPHRAGMATIC HERNIA WITHOUT OBSTRUCTION OR GANGRENE: Status: RESOLVED | Noted: 2018-02-28 | Resolved: 2025-09-03

## 2025-09-03 PROBLEM — K52.9 COLITIS, NONSPECIFIC: Status: ACTIVE | Noted: 2025-09-03

## 2025-09-03 PROBLEM — K64.0 FIRST DEGREE HEMORRHOIDS: Status: ACTIVE | Noted: 2019-04-03

## 2025-09-03 PROBLEM — K58.0 IRRITABLE BOWEL SYNDROME WITH DIARRHEA: Status: ACTIVE | Noted: 2020-12-07

## 2025-09-03 PROCEDURE — 99999 PR PBB SHADOW E&M-EST. PATIENT-LVL III: CPT | Mod: PBBFAC,,, | Performed by: SURGERY

## 2025-09-03 PROCEDURE — 99214 OFFICE O/P EST MOD 30 MIN: CPT | Mod: S$PBB,,, | Performed by: SURGERY

## 2025-09-03 PROCEDURE — 99213 OFFICE O/P EST LOW 20 MIN: CPT | Mod: PBBFAC | Performed by: SURGERY

## 2025-09-03 RX ORDER — KETOROLAC TROMETHAMINE 15 MG/ML
15 INJECTION, SOLUTION INTRAMUSCULAR; INTRAVENOUS ONCE
OUTPATIENT
Start: 2025-09-03 | End: 2025-09-03

## 2025-09-03 RX ORDER — LIDOCAINE HYDROCHLORIDE 10 MG/ML
1 INJECTION, SOLUTION EPIDURAL; INFILTRATION; INTRACAUDAL; PERINEURAL ONCE
OUTPATIENT
Start: 2025-09-03 | End: 2025-09-03

## 2025-09-03 RX ORDER — MELOXICAM 7.5 MG/1
7.5 TABLET ORAL
COMMUNITY
End: 2025-09-03

## 2025-09-03 RX ORDER — GABAPENTIN 250 MG/5ML
300 SOLUTION ORAL 2 TIMES DAILY
OUTPATIENT
Start: 2025-09-03

## 2025-09-03 RX ORDER — PANTOPRAZOLE SODIUM 40 MG/10ML
40 INJECTION, POWDER, LYOPHILIZED, FOR SOLUTION INTRAVENOUS 2 TIMES DAILY
OUTPATIENT
Start: 2025-09-03

## 2025-09-03 RX ORDER — MUPIROCIN 20 MG/G
OINTMENT TOPICAL
OUTPATIENT
Start: 2025-09-03

## 2025-09-03 RX ORDER — METHYLPREDNISOLONE 4 MG/1
TABLET ORAL
COMMUNITY
Start: 2025-07-30 | End: 2025-09-03

## 2025-09-03 RX ORDER — SODIUM CHLORIDE, SODIUM LACTATE, POTASSIUM CHLORIDE, CALCIUM CHLORIDE 600; 310; 30; 20 MG/100ML; MG/100ML; MG/100ML; MG/100ML
INJECTION, SOLUTION INTRAVENOUS CONTINUOUS
OUTPATIENT
Start: 2025-09-03

## 2025-09-03 RX ORDER — HEPARIN SODIUM 5000 [USP'U]/ML
5000 INJECTION, SOLUTION INTRAVENOUS; SUBCUTANEOUS ONCE
OUTPATIENT
Start: 2025-09-03 | End: 2025-09-03

## 2025-09-03 RX ORDER — ENOXAPARIN SODIUM 100 MG/ML
40 INJECTION SUBCUTANEOUS EVERY 24 HOURS
OUTPATIENT
Start: 2025-09-03

## 2025-09-03 RX ORDER — HYDROCODONE BITARTRATE AND ACETAMINOPHEN 7.5; 325 MG/1; MG/1
1 TABLET ORAL 3 TIMES DAILY PRN
COMMUNITY
Start: 2025-04-28 | End: 2025-09-03

## 2025-09-03 RX ORDER — DEXTROMETHORPHAN/PSEUDOEPHED 2.5-7.5/.8
40 DROPS ORAL 4 TIMES DAILY PRN
OUTPATIENT
Start: 2025-09-03

## 2025-09-03 RX ORDER — ONDANSETRON HYDROCHLORIDE 2 MG/ML
8 INJECTION, SOLUTION INTRAVENOUS EVERY 6 HOURS
OUTPATIENT
Start: 2025-09-03

## 2025-09-03 RX ORDER — FAMOTIDINE 10 MG/ML
20 INJECTION, SOLUTION INTRAVENOUS ONCE
OUTPATIENT
Start: 2025-09-03 | End: 2025-09-03

## 2025-09-03 RX ORDER — SCOPOLAMINE 1 MG/3D
1 PATCH, EXTENDED RELEASE TRANSDERMAL ONCE
OUTPATIENT
Start: 2025-09-03 | End: 2025-09-03

## 2025-09-03 RX ORDER — HYDROCODONE BITARTRATE AND ACETAMINOPHEN 5; 325 MG/1; MG/1
1 TABLET ORAL 3 TIMES DAILY PRN
COMMUNITY
Start: 2025-04-02 | End: 2025-09-03

## 2025-09-03 RX ORDER — PROCHLORPERAZINE EDISYLATE 5 MG/ML
5 INJECTION INTRAMUSCULAR; INTRAVENOUS EVERY 6 HOURS PRN
OUTPATIENT
Start: 2025-09-03

## 2025-09-03 RX ORDER — SODIUM CHLORIDE 9 MG/ML
INJECTION, SOLUTION INTRAVENOUS CONTINUOUS
OUTPATIENT
Start: 2025-09-03

## 2025-09-03 RX ORDER — ACETAMINOPHEN 650 MG/20.3ML
1000 LIQUID ORAL EVERY 8 HOURS
OUTPATIENT
Start: 2025-09-03

## 2025-09-03 RX ORDER — OXYCODONE HCL 5 MG/5 ML
5 SOLUTION, ORAL ORAL EVERY 6 HOURS PRN
Refills: 0 | OUTPATIENT
Start: 2025-09-03

## 2025-09-03 RX ORDER — ACETAMINOPHEN 650 MG/20.3ML
500 LIQUID ORAL
OUTPATIENT
Start: 2025-09-03

## 2025-09-03 RX ORDER — ACETAMINOPHEN 10 MG/ML
1000 INJECTION, SOLUTION INTRAVENOUS ONCE
OUTPATIENT
Start: 2025-09-03 | End: 2025-09-03

## 2025-09-03 RX ORDER — PANTOPRAZOLE SODIUM 40 MG/1
1 TABLET, DELAYED RELEASE ORAL EVERY MORNING
COMMUNITY
End: 2025-09-03

## 2025-09-04 ENCOUNTER — OFFICE VISIT (OUTPATIENT)
Dept: CARDIOLOGY | Facility: CLINIC | Age: 45
End: 2025-09-04
Payer: MEDICARE

## 2025-09-04 ENCOUNTER — CLINICAL SUPPORT (OUTPATIENT)
Dept: BARIATRICS | Facility: CLINIC | Age: 45
End: 2025-09-04
Payer: MEDICARE

## 2025-09-04 ENCOUNTER — TELEPHONE (OUTPATIENT)
Dept: BARIATRICS | Facility: CLINIC | Age: 45
End: 2025-09-04

## 2025-09-04 VITALS
HEART RATE: 75 BPM | BODY MASS INDEX: 37.04 KG/M2 | HEIGHT: 62 IN | OXYGEN SATURATION: 95 % | SYSTOLIC BLOOD PRESSURE: 139 MMHG | DIASTOLIC BLOOD PRESSURE: 95 MMHG | WEIGHT: 201.25 LBS

## 2025-09-04 DIAGNOSIS — R94.39 ABNORMAL STRESS ECG: Primary | ICD-10-CM

## 2025-09-04 DIAGNOSIS — Z71.3 DIETARY COUNSELING AND SURVEILLANCE: Primary | ICD-10-CM

## 2025-09-04 PROCEDURE — 99203 OFFICE O/P NEW LOW 30 MIN: CPT | Mod: S$PBB,GC,, | Performed by: INTERNAL MEDICINE

## 2025-09-04 PROCEDURE — 99213 OFFICE O/P EST LOW 20 MIN: CPT | Mod: PBBFAC

## 2025-09-04 PROCEDURE — 99999 PR PBB SHADOW E&M-EST. PATIENT-LVL III: CPT | Mod: PBBFAC,GC,,

## (undated) DEVICE — BANDAID STRIP PLASTIC 3/4X3

## (undated) DEVICE — DRESSING LEUKOPLAST FLEX 1X3IN

## (undated) DEVICE — BANDAGE ADHESIVE